# Patient Record
Sex: MALE | Race: WHITE | NOT HISPANIC OR LATINO | Employment: FULL TIME | URBAN - METROPOLITAN AREA
[De-identification: names, ages, dates, MRNs, and addresses within clinical notes are randomized per-mention and may not be internally consistent; named-entity substitution may affect disease eponyms.]

---

## 2017-01-31 ENCOUNTER — OFFICE VISIT (OUTPATIENT)
Dept: INTERNAL MEDICINE | Facility: CLINIC | Age: 57
End: 2017-01-31
Payer: COMMERCIAL

## 2017-01-31 VITALS
TEMPERATURE: 98 F | WEIGHT: 171.5 LBS | OXYGEN SATURATION: 98 % | SYSTOLIC BLOOD PRESSURE: 120 MMHG | HEART RATE: 88 BPM | BODY MASS INDEX: 23.23 KG/M2 | HEIGHT: 72 IN | DIASTOLIC BLOOD PRESSURE: 74 MMHG | RESPIRATION RATE: 18 BRPM

## 2017-01-31 DIAGNOSIS — B20 HIV WASTING SYNDROME: ICD-10-CM

## 2017-01-31 DIAGNOSIS — H46.9 OPTIC NEURITIS, LEFT: ICD-10-CM

## 2017-01-31 DIAGNOSIS — E29.1 HYPOGONADISM IN MALE: Chronic | ICD-10-CM

## 2017-01-31 DIAGNOSIS — B20 HIV DISEASE: Primary | Chronic | ICD-10-CM

## 2017-01-31 DIAGNOSIS — A53.9: ICD-10-CM

## 2017-01-31 DIAGNOSIS — E88.A HIV WASTING SYNDROME: ICD-10-CM

## 2017-01-31 PROCEDURE — 1159F MED LIST DOCD IN RCRD: CPT | Mod: S$GLB,,, | Performed by: INTERNAL MEDICINE

## 2017-01-31 PROCEDURE — 99214 OFFICE O/P EST MOD 30 MIN: CPT | Mod: S$GLB,,, | Performed by: INTERNAL MEDICINE

## 2017-01-31 RX ORDER — ASPIRIN 81 MG/1
81 TABLET ORAL DAILY
COMMUNITY
End: 2017-02-23

## 2017-01-31 RX ORDER — MONTELUKAST SODIUM 10 MG/1
10 TABLET ORAL DAILY
Refills: 11 | COMMUNITY
Start: 2017-01-16 | End: 2017-09-11 | Stop reason: SDUPTHER

## 2017-01-31 RX ORDER — ACYCLOVIR 800 MG/1
800 TABLET ORAL
Qty: 100 TABLET | Refills: 0 | Status: SHIPPED | OUTPATIENT
Start: 2017-01-31 | End: 2017-02-23 | Stop reason: SDUPTHER

## 2017-01-31 RX ORDER — CETIRIZINE HYDROCHLORIDE 10 MG/1
10 TABLET ORAL DAILY
COMMUNITY
End: 2019-11-04

## 2017-01-31 NOTE — PROGRESS NOTES
"Subjective:      Patient ID: Reji Gates is a 56 y.o. male.    Chief Complaint: Follow-up (blurr vision); lesion (anal lesion); CRAMPING (pt c/o cramping in joints); Depression; and Altered Mental Status    HPI: " I've been thru a lot"  - treated for lues x3  - optho: Dr. Tian Argueta, left eye "inflamed optic nerve".  - referral for spinal tap/MRI    Both negative.for lues.  - new lesions around anus, no unprotected sex. Quit taking his acyclovir months ago.  -Dr. Julieth elise neuro-optho arranged for LP done at [facility]    Review of Systems   Constitutional: Positive for fatigue. Negative for activity change, appetite change, chills, diaphoresis and fever.   HENT: Negative.    Eyes: Positive for pain and visual disturbance.   Respiratory: Negative.    Cardiovascular: Negative.    Gastrointestinal: Negative.    Genitourinary: Positive for genital sores.   Musculoskeletal: Negative.         Wasting   Allergic/Immunologic: Negative.    Neurological: Positive for light-headedness.   Hematological: Negative.    Psychiatric/Behavioral: Positive for decreased concentration, dysphoric mood and sleep disturbance.       Objective:     Visit Vitals    /74 (BP Location: Right arm, Patient Position: Sitting, BP Method: Manual)    Pulse 88    Temp 97.9 °F (36.6 °C) (Oral)    Resp 18    Ht 6' (1.829 m)    Wt 77.8 kg (171 lb 8.3 oz)    SpO2 98%    BMI 23.26 kg/m2       Physical Exam   Constitutional: He is oriented to person, place, and time. He appears well-developed and well-nourished.   HENT:   Head: Normocephalic and atraumatic.   Right Ear: External ear normal.   Left Ear: External ear normal.   Nose: Nose normal.   Mouth/Throat: Oropharynx is clear and moist.   Eyes: Conjunctivae and EOM are normal. Pupils are equal, round, and reactive to light.   Neck: Normal range of motion. Neck supple. No JVD present.   Cardiovascular: Normal rate, regular rhythm and normal heart sounds.    Pulmonary/Chest: Effort normal and " breath sounds normal.   Abdominal: Soft. Bowel sounds are normal.   Genitourinary:         Genitourinary Comments: Minimal ulcerations.   Musculoskeletal: Normal range of motion.   Neurological: He is alert and oriented to person, place, and time.   Skin: Skin is warm and dry.   Psychiatric: He has a normal mood and affect. His behavior is normal.   Nursing note and vitals reviewed.      Assessment:     1. HIV disease    2. Hypogonadism in male    3. Lues    4. Optic neuritis, left    5. HIV wasting syndrome      Plan:     HIV disease  -     CBC auto differential; Future; Expected date: 1/31/17  -     Comprehensive metabolic panel; Future; Expected date: 1/31/17  -     CD4 T-[place] Cells; Future; Expected date: 1/31/17  -     Urinalysis; Future; Expected date: 1/31/17  -     HIV RNA, quantitative, PCR; Future; Expected date: 1/31/17    Hypogonadism in male  -     Cortisol; Future; Expected date: 1/31/17  -     Prolactin; Future; Expected date: 1/31/17  -     DHEA-sulfate; Future; Expected date: 1/31/17  -     Testosterone; Future; Expected date: 1/31/17    Lues  -     RPR; Future; Expected date: 1/31/17  -     Urinalysis; Future; Expected date: 1/31/17    Optic neuritis, left  -     RPR; Future; Expected date: 1/31/17  -     Toxoplasma Antibodies (IgG,IgM); Future; Expected date: 1/31/17  -     Cytomegalovirus antibody, IgG; Future; Expected date: 1/31/17  -     Herpes simplex type 1 & 2 IgM,Herpes IgM; Future; Expected date: 1/31/17  -     Herpes simplex type 1&2 IgG,Herpes titer; Future; Expected date: 1/31/17  -     Ct. Ng. Mycoplasmas CLAUDIA, Urine; Future; Expected date: 1/31/17  -     acyclovir (ZOVIRAX) 800 MG Tab; Take 1 tablet (800 mg total) by mouth 5 (five) times daily.  Dispense: 100 tablet; Refill: 0    HIV wasting syndrome  -     Lipid panel; Future; Expected date: 1/31/17  -     Cortisol; Future; Expected date: 1/31/17  -     Prolactin; Future; Expected date: 1/31/17  -     DHEA-sulfate; Future; Expected  date: 1/31/17  -     TSH; Future; Expected date: 1/31/17  -     Testosterone; Future; Expected date: 1/31/17

## 2017-01-31 NOTE — MR AVS SNAPSHOT
[facility] Internal Medicine  [address],  [address]  [place] [address]  Phone: [phone]  Fax: [phone]                  Reji Gates   2017 8:20 AM   Office Visit    Description:  Male : 1960   Provider:  Jamar Mike MD   Department:  [facility] Internal Medicine           Reason for Visit     Follow-up     lesion     CRAMPING     Depression     Altered Mental Status           Diagnoses this Visit        Comments    HIV disease    -  Primary     Hypogonadism in male         Lues         Optic neuritis, left         HIV wasting syndrome                To Do List           Goals (5 Years of Data)     None       These Medications        Disp Refills Start End    acyclovir (ZOVIRAX) 800 MG Tab 100 tablet 0 2017    Take 1 tablet (800 mg total) by mouth 5 (five) times daily. - Oral    [facility] [address] [place] [address] AVE AT [address] [place] [address] Ph #: [phone]         [facility] On Call     [facility] On Call Nurse Care Line -  Assistance  Registered nurses in the [facility] On Call Center provide clinical advisement, health education, appointment booking, and other advisory services.  Call for this free service at [phone].             Medications           Message regarding Medications     Verify the changes and/or additions to your medication regime listed below are the same as discussed with your clinician today.  If any of these changes or additions are incorrect, please notify your healthcare provider.        START taking these NEW medications        Refills    acyclovir (ZOVIRAX) 800 MG Tab 0    Sig: Take 1 tablet (800 mg total) by mouth 5 (five) times daily.    Class: Normal    Route: Oral      STOP taking these medications     sulfamethoxazole-trimethoprim 800-160mg (BACTRIM DS) 800-160 mg Tab Take 1 tablet by mouth 2 (two) times daily.           Verify that the below list of  medications is an accurate representation of the medications you are currently taking.  If none reported, the list may be blank. If incorrect, please contact your healthcare provider. Carry this list with you in case of emergency.           Current Medications     alprazolam (XANAX) 0.5 MG tablet Take 1 tablet (0.5 mg total) by mouth once daily.    ascorbic acid (VITAMIN C) 1000 MG tablet Take 1,000 mg by mouth 2 (two) times daily.    aspirin (ECOTRIN) 81 MG EC tablet Take 81 mg by mouth once daily.    azelastine (OPTIVAR) 0.05 % ophthalmic solution Place 1 drop into both eyes once daily.    cetirizine (ZYRTEC) 10 MG tablet Take 10 mg by mouth once daily.    clindamycin phosphate 1% (CLINDAGEL) 1 % gel APPLY BID    vmltrkjp-tqcwmeep-gtbqmz-tenof, STRIBILD, 169-277-074-300 mg (STRIBILD) [phone] mg per tablet Take 1 tablet by mouth once daily.    FISH,SAF,FLX,BRG OILS/O3,6,9#2 (FISH-FLAX-BORAGE OIL ORAL) Take 1 capsule by mouth 2 (two) times daily.    hydrocodone-ibuprofen 7.5-200 mg (VICOPROFEN) 7.5-200 mg per tablet Take 1 tablet by mouth every 12 (twelve) hours.    mometasone (NASONEX) 50 mcg/actuation nasal spray 2 sprays by Nasal route once daily.    montelukast (SINGULAIR) 10 mg tablet Take 10 mg by mouth once daily.    MULTIVIT-MINERALS/FERROUS FUM (MULTI VITAMIN ORAL) Take by mouth.    ranitidine (ZANTAC) 150 MG tablet Take 150 mg by mouth 2 (two) times daily.    acyclovir (ZOVIRAX) 800 MG Tab Take 1 tablet (800 mg total) by mouth 5 (five) times daily.           Clinical Reference Information           Vital Signs - Last Recorded  Most recent update: 1/31/2017  8:36 AM by Thelma Cruz MA    BP Pulse Temp Resp Ht Wt    120/74 (BP Location: Right arm, Patient Position: Sitting, BP Method: Manual) 88 97.9 °F (36.6 °C) (Oral) 18 6' (1.829 m) 77.8 kg (171 lb 8.3 oz)    SpO2 BMI             98% 23.26 kg/m2         Blood Pressure          Most Recent Value    BP  120/74      Allergies as of 1/31/2017     No  Known Allergies      Immunizations Administered on Date of Encounter - 1/31/2017     None      Orders Placed During Today's Visit      Normal Orders This Visit    CBC auto differential     CD4 T-[place] Cells     Comprehensive metabolic panel     Ct. Ng. Mycoplasmas CLAUDIA, Urine     Cytomegalovirus antibody, IgG     HIV RNA, quantitative, PCR     Lipid panel     RPR     Urinalysis     Future Labs/Procedures Expected by Expires    CBC auto differential  1/31/2017 1/31/2018    CD4 T-[place] Cells  1/31/2017 4/1/2018    Comprehensive metabolic panel  1/31/2017 1/31/2018    Cortisol  1/31/2017 4/1/2018    Ct. Ng. Mycoplasmas CLAUDIA, Urine  1/31/2017 4/1/2018    Cytomegalovirus antibody, IgG  1/31/2017 4/1/2018    DHEA-sulfate  1/31/2017 4/1/2018    Herpes simplex type 1 & 2 IgM,Herpes IgM  1/31/2017 4/1/2018    Herpes simplex type 1&2 IgG,Herpes titer  1/31/2017 4/1/2018    HIV RNA, quantitative, PCR  1/31/2017 4/1/2018    Lipid panel  1/31/2017 1/31/2018    Prolactin  1/31/2017 4/1/2018    RPR  1/31/2017 4/1/2018    Testosterone  1/31/2017 1/31/2018    Toxoplasma Antibodies (IgG,IgM)  1/31/2017 4/1/2018    TSH  1/31/2017 1/31/2018    Urinalysis  1/31/2017 1/31/2018

## 2017-02-02 LAB
ALBUMIN SERPL-MCNC: 4.4 G/DL (ref 3.5–5.5)
ALBUMIN/GLOB SERPL: 2.1 {RATIO} (ref 1.1–2.5)
ALP SERPL-CCNC: 98 IU/L (ref 39–117)
ALT SERPL-CCNC: 67 IU/L (ref 0–44)
APPEARANCE UR: CLEAR
AST SERPL-CCNC: 47 IU/L (ref 0–40)
BASOPHILS # BLD AUTO: 0 X10E3/UL (ref 0–0.2)
BASOPHILS NFR BLD AUTO: 1 %
BILIRUB SERPL-MCNC: 0.4 MG/DL (ref 0–1.2)
BILIRUB UR QL STRIP: NEGATIVE
BUN SERPL-MCNC: 20 MG/DL (ref 6–24)
BUN/CREAT SERPL: 19 (ref 9–20)
CALCIUM SERPL-MCNC: 9.4 MG/DL (ref 8.7–10.2)
CD3+CD4+ CELLS # BLD: 944 /UL (ref 359–1519)
CD3+CD4+ CELLS NFR BLD: 42.9 % (ref 30.8–58.5)
CHLORIDE SERPL-SCNC: 103 MMOL/L (ref 96–106)
CHOLEST SERPL-MCNC: 164 MG/DL (ref 100–199)
CMV IGG SERPL IA-ACNC: >10 U/ML (ref 0–0.59)
CO2 SERPL-SCNC: 24 MMOL/L (ref 18–29)
COLOR UR: YELLOW
CREAT SERPL-MCNC: 1.07 MG/DL (ref 0.76–1.27)
EOSINOPHIL # BLD AUTO: 0.1 X10E3/UL (ref 0–0.4)
EOSINOPHIL NFR BLD AUTO: 1 %
ERYTHROCYTE [DISTWIDTH] IN BLOOD BY AUTOMATED COUNT: 14.8 % (ref 12.3–15.4)
GLOBULIN SER CALC-MCNC: 2.1 G/DL (ref 1.5–4.5)
GLUCOSE SERPL-MCNC: 95 MG/DL (ref 65–99)
GLUCOSE UR QL: NEGATIVE
HCT VFR BLD AUTO: 47.1 % (ref 37.5–51)
HDLC SERPL-MCNC: 43 MG/DL
HGB BLD-MCNC: 15.4 G/DL (ref 12.6–17.7)
HGB UR QL STRIP: NEGATIVE
HIV1 RNA # SERPL NAA+PROBE: <20 COPIES/ML
HIV1 RNA SERPL NAA+PROBE-LOG#: NORMAL LOG10COPY/ML
IMM GRANULOCYTES # BLD: 0 X10E3/UL (ref 0–0.1)
IMM GRANULOCYTES NFR BLD: 0 %
KETONES UR QL STRIP: NEGATIVE
LDLC SERPL CALC-MCNC: 92 MG/DL (ref 0–99)
LEUKOCYTE ESTERASE UR QL STRIP: NEGATIVE
LYMPHOCYTES # BLD AUTO: 2.2 X10E3/UL (ref 0.7–3.1)
LYMPHOCYTES NFR BLD AUTO: 35 %
MCH RBC QN AUTO: 30.9 PG (ref 26.6–33)
MCHC RBC AUTO-ENTMCNC: 32.7 G/DL (ref 31.5–35.7)
MCV RBC AUTO: 95 FL (ref 79–97)
MICRO URNS: NORMAL
MONOCYTES # BLD AUTO: 0.5 X10E3/UL (ref 0.1–0.9)
MONOCYTES NFR BLD AUTO: 9 %
NEUTROPHILS # BLD AUTO: 3.5 X10E3/UL (ref 1.4–7)
NEUTROPHILS NFR BLD AUTO: 54 %
NITRITE UR QL STRIP: NEGATIVE
PH UR STRIP: 5 [PH] (ref 5–7.5)
PLATELET # BLD AUTO: 253 X10E3/UL (ref 150–379)
POTASSIUM SERPL-SCNC: 4.6 MMOL/L (ref 3.5–5.2)
PROT SERPL-MCNC: 6.5 G/DL (ref 6–8.5)
PROT UR QL STRIP: NORMAL
RBC # BLD AUTO: 4.98 X10E6/UL (ref 4.14–5.8)
RPR SER QL: REACTIVE
RPR SER-TITR: ABNORMAL {TITER}
SODIUM SERPL-SCNC: 143 MMOL/L (ref 134–144)
SP GR UR: 1.03 (ref 1–1.03)
TRIGL SERPL-MCNC: 145 MG/DL (ref 0–149)
UROBILINOGEN UR STRIP-MCNC: 0.2 MG/DL (ref 0.2–1)
VLDLC SERPL CALC-MCNC: 29 MG/DL (ref 5–40)
WBC # BLD AUTO: 6.3 X10E3/UL (ref 3.4–10.8)

## 2017-02-03 LAB
C TRACH RRNA UR QL NAA+PROBE: NEGATIVE
COMMENT: NORMAL
M GENITALIUM DNA SPEC QL NAA+PROBE: NEGATIVE
M HOMINIS DNA SPEC QL NAA+PROBE: NEGATIVE
N GONORRHOEA RRNA UR QL NAA+PROBE: NEGATIVE
UREAPLASMA DNA SPEC QL NAA+PROBE: NEGATIVE

## 2017-02-10 ENCOUNTER — OFFICE VISIT (OUTPATIENT)
Dept: INTERNAL MEDICINE | Facility: CLINIC | Age: 57
End: 2017-02-10
Payer: COMMERCIAL

## 2017-02-10 VITALS
HEART RATE: 90 BPM | HEIGHT: 72 IN | SYSTOLIC BLOOD PRESSURE: 121 MMHG | RESPIRATION RATE: 18 BRPM | OXYGEN SATURATION: 98 % | BODY MASS INDEX: 23.2 KG/M2 | DIASTOLIC BLOOD PRESSURE: 78 MMHG | TEMPERATURE: 99 F | WEIGHT: 171.31 LBS

## 2017-02-10 DIAGNOSIS — H46.9 LEFT OPTIC NEURITIS: ICD-10-CM

## 2017-02-10 DIAGNOSIS — B20 HIV DISEASE: Primary | Chronic | ICD-10-CM

## 2017-02-10 DIAGNOSIS — E29.1 HYPOGONADISM IN MALE: Chronic | ICD-10-CM

## 2017-02-10 PROCEDURE — 99214 OFFICE O/P EST MOD 30 MIN: CPT | Mod: S$GLB,,, | Performed by: INTERNAL MEDICINE

## 2017-02-10 NOTE — MR AVS SNAPSHOT
[facility] Internal Medicine  [address],  [address]  [place] [address]  Phone: [phone]  Fax: [phone]                  Reji Gates   2/10/2017 8:40 AM   Office Visit    Description:  Male : 1960   Provider:  Jamar Mike MD   Department:  [facility]           Reason for Visit     Results     Cough     Medication Refill           Diagnoses this Visit        Comments    HIV disease    -  Primary     Left optic neuritis         Hypogonadism in male                To Do List           Future Appointments        Provider Department Dept Phone    2017 10:20 AM Jamar Mike MD [facility] [phone]      Goals (5 Years of Data)     None      [facility]     [facility] Care [facility] -  Assistance  Registered nurses in the [facility] Center provide clinical advisement, health education, appointment booking, and other advisory services.  Call for this free service at [phone].             Medications           Message regarding Medications     Verify the changes and/or additions to your medication regime listed below are the same as discussed with your clinician today.  If any of these changes or additions are incorrect, please notify your healthcare provider.             Verify that the below list of medications is an accurate representation of the medications you are currently taking.  If none reported, the list may be blank. If incorrect, please contact your healthcare provider. Carry this list with you in case of emergency.           Current Medications     acyclovir (ZOVIRAX) 800 MG Tab Take 1 tablet (800 mg total) by mouth 5 (five) times daily.    alprazolam (XANAX) 0.5 MG tablet Take 1 tablet (0.5 mg total) by mouth once daily.    ascorbic acid (VITAMIN C) 1000 MG tablet Take 1,000 mg by mouth 2 (two) times daily.    aspirin (ECOTRIN) 81 MG EC tablet Take 81 mg by mouth once daily.     azelastine (OPTIVAR) 0.05 % ophthalmic solution Place 1 drop into both eyes once daily.    cetirizine (ZYRTEC) 10 MG tablet Take 10 mg by mouth once daily.    clindamycin phosphate 1% (CLINDAGEL) 1 % gel APPLY BID    rkvoghkp-acveoqes-gbskit-tenof, STRIBILD, [phone] mg (STRIBILD) [phone] mg per tablet Take 1 tablet by mouth once daily.    FISH,SAF,FLX,BRG OILS/O3,6,9#2 (FISH-FLAX-BORAGE OIL ORAL) Take 1 capsule by mouth 2 (two) times daily.    hydrocodone-ibuprofen 7.5-200 mg (VICOPROFEN) 7.5-200 mg per tablet Take 1 tablet by mouth every 12 (twelve) hours.    mometasone (NASONEX) 50 mcg/actuation nasal spray 2 sprays by Nasal route once daily.    montelukast (SINGULAIR) 10 mg tablet Take 10 mg by mouth once daily.    MULTIVIT-MINERALS/FERROUS FUM (MULTI VITAMIN ORAL) Take by mouth.    ranitidine (ZANTAC) 150 MG tablet Take 150 mg by mouth 2 (two) times daily.           Clinical Reference Information           Your Vitals Were     BP Pulse Temp Resp Height Weight    121/78 (BP Location: Left arm, Patient Position: Sitting, BP Method: Manual) 90 98.6 °F (37 °C) (Oral) 18 6' (1.829 m) 77.7 kg (171 lb 4.8 oz)    SpO2 BMI             98% 23.23 kg/m2         Blood Pressure          Most Recent Value    BP  121/78      Allergies as of 2/10/2017     No Known Allergies      Immunizations Administered on Date of Encounter - 2/10/2017     None      Orders Placed During Today's Visit      Normal Orders This Visit    Cortisol     DHEA-sulfate     Herpes simplex type 1 & 2 IgM,Herpes IgM     Herpes simplex type 1&2 IgG,Herpes titer     Testosterone     Toxoplasma gondii antibody, IgM     Future Labs/Procedures Expected by Expires    Cortisol  2/10/2017 4/11/2018    DHEA-sulfate  2/10/2017 4/11/2018    Herpes simplex type 1 & 2 IgM,Herpes IgM  2/10/2017 4/11/2018    Herpes simplex type 1&2 IgG,Herpes titer  2/10/2017 4/11/2018    Testosterone  2/10/2017 2/10/2018    Toxoplasma gondii antibody, IgM  2/10/2017  4/11/2018      Language Assistance Services     ATTENTION: Language assistance services are available, free of charge. Please call [phone].      ATENCIÓN: Si habla romel, tiene a bailey disposición servicios gratuitos de asistencia lingüística. Llame al [phone].     CHÚ Ý: N?u b?n nói Ti?ng Vi?t, có các d?ch v? h? tr? ngôn ng? mi?n phí dành cho b?n. G?i s? [phone].         [facility] complies with applicable Federal civil rights laws and does not discriminate on the basis of race, color, national origin, age, disability, or sex.

## 2017-02-10 NOTE — PROGRESS NOTES
Subjective:      Patient ID: Reji Gates is a 56 y.o. male.    Chief Complaint: Results (HIV Positive/AID Lab results); Cough (pt c/o coughting for three days); and Medication Refill    HPI: Not all labs were drawn.  RPR + 1:256, this must be followed carefully.  His vision, seems to be better.  Still bruising easily.  Thinks lesions may be better.  Wet cough, but no fever,chills.   01/31/17 1102 HDL 43 - Final result     01/31/17 1102 CHOL 164 - Final result     01/31/17 1102 TRIG 145 - Final result     01/31/17 1102 LDLCALC 92 - Final result     01/31/17 1102 COLORU Yellow - Final result     01/31/17 1102 SPECGRAV 1.026 - Final result     01/31/17 1102 PHUR 5.0 - Final result     01/31/17 1102 GLUCOSEU Negative - Final result     01/31/17 1102 BILIRUBINUR Negative - Final result     01/31/17 1102 KETONESU Negative - Final result     01/31/17 1102 OCCULTUA Negative - Final result     01/31/17 1102 PROTEINUA Trace - Final result     01/31/17 1102 NITRITE Negative - Final result     01/31/17 1102 LEUKOCYTESUR Negative - Final result     01/31/17 1102 WBC 6.3 - Final result     01/31/17 1102 RBC 4.98 - Final result     01/31/17 1102 HGB 15.4 - Final result     01/31/17 1102 HCT 47.1 - Final result     01/31/17 1102 MCH 30.9 - Final result     01/31/17 1102 RDW 14.8 - Final result     01/31/17 1102  - Final result     01/31/17 1102 GLU 95 - Final result     01/31/17 1102 BUN 20 - Final result     01/31/17 1102 CREATININE 1.07 - Final result     01/31/17 1102 CALCIUM 9.4 - Final result     01/31/17 1102  - Final result     01/31/17 1102 K 4.6 - Final result     01/31/17 1102  - Final result     01/31/17 1102 PROT 6.5 - Final result     01/31/17 1102 ALBUMIN 4.4 - Final result     01/31/17 1102 BILITOT 0.4 - Final result     01/31/17 1102 AST 47 High Final result     01/31/17 1102 ALKPHOS 98 - Final result     01/31/17 1102 CO2 24 - Final result     01/31/17 1102 ALT 67 High Final result      01/31/17 1102 EGFRNONAA 76 - Final result     01/31/17 1102 MCV 95 - Final result           Review of Systems    Objective:     Visit Vitals    /78 (BP Location: Left arm, Patient Position: Sitting, BP Method: Manual)    Pulse 90    Temp 98.6 °F (37 °C) (Oral)    Resp 18    Ht 6' (1.829 m)    Wt 77.7 kg (171 lb 4.8 oz)    SpO2 98%    BMI 23.23 kg/m2       Physical Exam   Constitutional: He is oriented to person, place, and time. He appears well-developed and well-nourished.   HENT:   Head: Normocephalic and atraumatic.   Right Ear: External ear normal.   Left Ear: External ear normal.   Nose: Nose normal.   Mouth/Throat: Oropharynx is clear and moist.   Eyes: Conjunctivae are normal. Pupils are equal, round, and reactive to light.   Neck: Normal range of motion.   Cardiovascular: Normal rate, regular rhythm and normal heart sounds.    Pulmonary/Chest: Effort normal and breath sounds normal. He has no wheezes. He exhibits no tenderness.   Abdominal: Soft. Bowel sounds are normal.   Musculoskeletal: Normal range of motion.   Neurological: He is alert and oriented to person, place, and time.   Skin: Skin is warm and dry.   Psychiatric: He has a normal mood and affect. His behavior is normal.   Nursing note and vitals reviewed.      Assessment:     1. HIV disease    2. Left optic neuritis    3. Hypogonadism in male      Plan:     HIV disease  -     Toxoplasma gondii antibody, IgM; Future; Expected date: 2/10/17  -     Herpes simplex type 1 & 2 IgM,Herpes IgM; Future; Expected date: 2/10/17  -     Herpes simplex type 1&2 IgG,Herpes titer; Future; Expected date: 2/10/17    Left optic neuritis  -     Toxoplasma gondii antibody, IgM; Future; Expected date: 2/10/17  -     Herpes simplex type 1 & 2 IgM,Herpes IgM; Future; Expected date: 2/10/17  -     Herpes simplex type 1&2 IgG,Herpes titer; Future; Expected date: 2/10/17    Hypogonadism in male  -     Cortisol; Future; Expected date: 2/10/17  -      DHEA-sulfate; Future; Expected date: 2/10/17  -     Testosterone; Future; Expected date: 2/10/17

## 2017-02-11 LAB
BASOPHILS # BLD AUTO: 0 X10E3/UL (ref 0–0.2)
BASOPHILS NFR BLD AUTO: 0 %
EOSINOPHIL # BLD AUTO: 0.1 X10E3/UL (ref 0–0.4)
EOSINOPHIL NFR BLD AUTO: 1 %
ERYTHROCYTE [DISTWIDTH] IN BLOOD BY AUTOMATED COUNT: 14.1 % (ref 12.3–15.4)
HCT VFR BLD AUTO: 44.7 % (ref 37.5–51)
HGB BLD-MCNC: 14.8 G/DL (ref 12.6–17.7)
IMM GRANULOCYTES # BLD: 0 X10E3/UL (ref 0–0.1)
IMM GRANULOCYTES NFR BLD: 0 %
LYMPHOCYTES # BLD AUTO: 2 X10E3/UL (ref 0.7–3.1)
LYMPHOCYTES NFR BLD AUTO: 20 %
MCH RBC QN AUTO: 31.2 PG (ref 26.6–33)
MCHC RBC AUTO-ENTMCNC: 33.1 G/DL (ref 31.5–35.7)
MCV RBC AUTO: 94 FL (ref 79–97)
MONOCYTES # BLD AUTO: 0.8 X10E3/UL (ref 0.1–0.9)
MONOCYTES NFR BLD AUTO: 8 %
NEUTROPHILS # BLD AUTO: 7.2 X10E3/UL (ref 1.4–7)
NEUTROPHILS NFR BLD AUTO: 71 %
PLATELET # BLD AUTO: 284 X10E3/UL (ref 150–379)
RBC # BLD AUTO: 4.75 X10E6/UL (ref 4.14–5.8)
WBC # BLD AUTO: 10.1 X10E3/UL (ref 3.4–10.8)

## 2017-02-13 LAB
COMMENT: ABNORMAL
COMMENT:: NORMAL
CORTIS SERPL-MCNC: 2.6 UG/DL
DHEA-S SERPL-MCNC: 68.2 UG/DL (ref 48.9–344.2)
HSV1 IGG SER IA-ACNC: 42.6 INDEX (ref 0–0.9)
HSV1+2 IGM SER IA-ACNC: <0.91 RATIO (ref 0–0.9)
HSV2 IGG SER IA-ACNC: 1.4 INDEX (ref 0–0.9)
T GONDII IGM SER IA-ACNC: <3 AU/ML (ref 0–7.9)
TESTOST SERPL-MCNC: >1500 NG/DL (ref 348–1197)

## 2017-02-23 ENCOUNTER — OFFICE VISIT (OUTPATIENT)
Dept: INTERNAL MEDICINE | Facility: CLINIC | Age: 57
End: 2017-02-23
Payer: COMMERCIAL

## 2017-02-23 VITALS
HEIGHT: 72 IN | RESPIRATION RATE: 18 BRPM | WEIGHT: 171.31 LBS | BODY MASS INDEX: 23.2 KG/M2 | TEMPERATURE: 99 F | OXYGEN SATURATION: 97 % | HEART RATE: 74 BPM

## 2017-02-23 DIAGNOSIS — H46.9 OPTIC NEURITIS, LEFT: ICD-10-CM

## 2017-02-23 DIAGNOSIS — J30.2 SEASONAL ALLERGIC RHINITIS, UNSPECIFIED ALLERGIC RHINITIS TRIGGER: ICD-10-CM

## 2017-02-23 DIAGNOSIS — F51.02 ADJUSTMENT INSOMNIA: Chronic | ICD-10-CM

## 2017-02-23 DIAGNOSIS — B20 HIV DISEASE: Chronic | ICD-10-CM

## 2017-02-23 PROCEDURE — 99214 OFFICE O/P EST MOD 30 MIN: CPT | Mod: S$GLB,,, | Performed by: INTERNAL MEDICINE

## 2017-02-23 PROCEDURE — 1160F RVW MEDS BY RX/DR IN RCRD: CPT | Mod: S$GLB,,, | Performed by: INTERNAL MEDICINE

## 2017-02-23 RX ORDER — ACYCLOVIR 800 MG/1
800 TABLET ORAL DAILY
Qty: 90 TABLET | Refills: 5 | Status: SHIPPED | OUTPATIENT
Start: 2017-02-23 | End: 2018-05-11 | Stop reason: SDUPTHER

## 2017-02-23 RX ORDER — ALPRAZOLAM 0.5 MG/1
0.5 TABLET ORAL DAILY
Qty: 30 TABLET | Refills: 2 | Status: SHIPPED | OUTPATIENT
Start: 2017-02-23 | End: 2018-05-11 | Stop reason: SDUPTHER

## 2017-02-23 RX ORDER — MOMETASONE FUROATE 50 UG/1
2 SPRAY, METERED NASAL DAILY
Qty: 17 G | Refills: 5 | Status: SHIPPED | OUTPATIENT
Start: 2017-02-23 | End: 2017-07-13 | Stop reason: SDUPTHER

## 2017-02-23 NOTE — MR AVS SNAPSHOT
[facility] Internal Medicine  [address],  [address]  [place] [address]  Phone: [phone]  Fax: [phone]                  Reji Gates   2017 10:20 AM   Office Visit    Description:  Male : 1960   Provider:  Jamar Mike MD   Department:  [facility]           Reason for Visit     Results     HIV Positive/AIDS     Medication Refill           Diagnoses this Visit        Comments    HIV disease         Optic neuritis, left         Adjustment insomnia         Seasonal allergic rhinitis, unspecified allergic rhinitis trigger                To Do List           Goals (5 Years of Data)     None       These Medications        Disp Refills Start End    bnnulcgw-ebcuypqp-ahdqyf-tenof, STRIBILD, [phone] mg (STRIBILD) [phone] mg per tablet 90 tablet 3 2017    Take 1 tablet by mouth once daily. - Oral    Pharmacy: [facility] - [facility] Ph #: [phone]       acyclovir (ZOVIRAX) 800 MG Tab 90 tablet 5 2017    Take 1 tablet (800 mg total) by mouth once daily. - Oral    Pharmacy: [facility] - [facility] Ph #: [phone]       alprazolam (XANAX) 0.5 MG tablet 30 tablet 2 2017     Take 1 tablet (0.5 mg total) by mouth once daily. - Oral    Pharmacy: [facility] - [facility] Ph #: [phone]       mometasone (NASONEX) 50 mcg/actuation nasal spray 17 g 5 2017     2 sprays by Nasal route once daily. - Nasal    Pharmacy: [facility] - [facility] Ph #: [phone]         [facility]     [facility] Care Line -  Assistance  Registered nurses in the [facility] On Call Center provide clinical advisement, health education, appointment booking, and other advisory services.  Call for this free service at [phone].              Medications           Message regarding Medications     Verify the changes and/or additions to your medication regime listed below are the same as discussed with your clinician today.  If any of these changes or additions are incorrect, please notify your healthcare provider.        CHANGE how you are taking these medications     Start Taking Instead of    acyclovir (ZOVIRAX) 800 MG Tab acyclovir (ZOVIRAX) 800 MG Tab    Dosage:  Take 1 tablet (800 mg total) by mouth once daily. Dosage:  Take 1 tablet (800 mg total) by mouth 5 (five) times daily.    Reason for Change:  Reorder       STOP taking these medications     aspirin (ECOTRIN) 81 MG EC tablet Take 81 mg by mouth once daily.           Verify that the below list of medications is an accurate representation of the medications you are currently taking.  If none reported, the list may be blank. If incorrect, please contact your healthcare provider. Carry this list with you in case of emergency.           Current Medications     acyclovir (ZOVIRAX) 800 MG Tab Take 1 tablet (800 mg total) by mouth once daily.    alprazolam (XANAX) 0.5 MG tablet Take 1 tablet (0.5 mg total) by mouth once daily.    ascorbic acid (VITAMIN C) 1000 MG tablet Take 1,000 mg by mouth 2 (two) times daily.    azelastine (OPTIVAR) 0.05 % ophthalmic solution Place 1 drop into both eyes once daily.    cetirizine (ZYRTEC) 10 MG tablet Take 10 mg by mouth once daily.    clindamycin phosphate 1% (CLINDAGEL) 1 % gel APPLY BID    tkgrsyrh-lzwchpen-nexlag-tenof, STRIBILD, [phone] mg (STRIBILD) [phone] mg per tablet Take 1 tablet by mouth once daily.    FISH,SAF,FLX,BRG OILS/O3,6,9#2 (FISH-FLAX-BORAGE OIL ORAL) Take 1 capsule by mouth 2 (two) times daily.    hydrocodone-ibuprofen 7.5-200 mg (VICOPROFEN) 7.5-200 mg per tablet Take 1 tablet by mouth every 12 (twelve) hours.    mometasone (NASONEX) 50 mcg/actuation nasal spray 2 sprays by Nasal route once daily.    montelukast (SINGULAIR)  10 mg tablet Take 10 mg by mouth once daily.    MULTIVIT-MINERALS/FERROUS FUM (MULTI VITAMIN ORAL) Take by mouth.    ranitidine (ZANTAC) 150 MG tablet Take 150 mg by mouth 2 (two) times daily.           Clinical Reference Information           Your Vitals Were     Pulse Temp Resp Height Weight SpO2    74 99 °F (37.2 °C) (Oral) 18 6' (1.829 m) 77.7 kg (171 lb 4.8 oz) 97%    BMI                23.23 kg/m2          Allergies as of 2/23/2017     No Known Allergies      Immunizations Administered on Date of Encounter - 2/23/2017     None      Language Assistance Services     ATTENTION: Language assistance services are available, free of charge. Please call [phone].      ATENCIÓN: Si quinn urena, tiene a bailey disposición servicios gratuitos de asistencia lingüística. Llame al [phone].     CHÚ Ý: N?u b?n nói Ti?ng Vi?t, có các d?ch v? h? tr? ngôn ng? mi?n phí dành cho b?n. G?i s? [phone].         [facility] complies with applicable Federal civil rights laws and does not discriminate on the basis of race, color, national origin, age, disability, or sex.

## 2017-02-23 NOTE — PROGRESS NOTES
Subjective:      Patient ID: Reji Gates is a 56 y.o. male.    Chief Complaint: Results; HIV Positive/AIDS; and Medication Refill    HPI: 56y/oWM, being followed by Retina specialist for a left optic neuritis.  Work up only + for IgG  HSV I & II .  IgM is -.  Rest of work up negative.  Testosterone level >1500, nl cortison and DHEA  Toxo negative.  CSF syphilis apparently neg, but result no available for me to see.    Review of Systems   HENT:        Allergic rhinitis   Eyes: Positive for visual disturbance.   Allergic/Immunologic: Positive for environmental allergies.   All other systems reviewed and are negative.      Objective:     Visit Vitals    Pulse 74    Temp 99 °F (37.2 °C) (Oral)    Resp 18    Ht 6' (1.829 m)    Wt 77.7 kg (171 lb 4.8 oz)    SpO2 97%    BMI 23.23 kg/m2       Physical Exam   Constitutional: He is oriented to person, place, and time. He appears well-developed and well-nourished.   HENT:   Head: Normocephalic and atraumatic.   Neck: Normal range of motion. Neck supple.   Cardiovascular: Normal rate, regular rhythm and normal heart sounds.    Pulmonary/Chest: Effort normal and breath sounds normal.   Musculoskeletal: Normal range of motion.   Neurological: He is alert and oriented to person, place, and time.   Skin: Skin is warm and dry.   Psychiatric: He has a normal mood and affect. His behavior is normal.   Nursing note and vitals reviewed.      Assessment:     1. HIV disease    2. Optic neuritis, left    3. Adjustment insomnia    4. Seasonal allergic rhinitis, unspecified allergic rhinitis trigger      Plan:     HIV disease  -     tztjkacp-treaoffg-qkvuiw-tenof, STRIBILD, [id number] mg (STRIBILD) [phone] mg per tablet; Take 1 tablet by mouth once daily.  Dispense: 90 tablet; Refill: 3    Optic neuritis, left  -     acyclovir (ZOVIRAX) 800 MG Tab; Take 1 tablet (800 mg total) by mouth once daily.  Dispense: 90 tablet; Refill: 5    Adjustment insomnia  -     alprazolam  (XANAX) 0.5 MG tablet; Take 1 tablet (0.5 mg total) by mouth once daily.  Dispense: 30 tablet; Refill: 2    Seasonal allergic rhinitis, unspecified allergic rhinitis trigger  -     mometasone (NASONEX) 50 mcg/actuation nasal spray; 2 sprays by Nasal route once daily.  Dispense: 17 g; Refill: 5

## 2017-03-13 ENCOUNTER — OFFICE VISIT (OUTPATIENT)
Dept: INTERNAL MEDICINE | Facility: CLINIC | Age: 57
End: 2017-03-13
Payer: COMMERCIAL

## 2017-03-13 VITALS
OXYGEN SATURATION: 99 % | SYSTOLIC BLOOD PRESSURE: 138 MMHG | TEMPERATURE: 100 F | DIASTOLIC BLOOD PRESSURE: 84 MMHG | RESPIRATION RATE: 20 BRPM | BODY MASS INDEX: 22.99 KG/M2 | HEIGHT: 72 IN | HEART RATE: 86 BPM | WEIGHT: 169.75 LBS

## 2017-03-13 DIAGNOSIS — J11.1 FLU SYNDROME: Primary | ICD-10-CM

## 2017-03-13 PROCEDURE — 99213 OFFICE O/P EST LOW 20 MIN: CPT | Mod: S$GLB,,, | Performed by: INTERNAL MEDICINE

## 2017-03-13 PROCEDURE — 1160F RVW MEDS BY RX/DR IN RCRD: CPT | Mod: S$GLB,,, | Performed by: INTERNAL MEDICINE

## 2017-03-13 RX ORDER — OSELTAMIVIR PHOSPHATE 75 MG/1
75 CAPSULE ORAL 2 TIMES DAILY
Qty: 10 CAPSULE | Refills: 0 | Status: SHIPPED | OUTPATIENT
Start: 2017-03-13 | End: 2017-03-18

## 2017-03-13 RX ORDER — OSELTAMIVIR PHOSPHATE 75 MG/1
75 CAPSULE ORAL 2 TIMES DAILY
Qty: 10 CAPSULE | Refills: 0 | Status: SHIPPED | OUTPATIENT
Start: 2017-03-13 | End: 2017-03-13 | Stop reason: SDUPTHER

## 2017-03-13 RX ORDER — CODEINE PHOSPHATE AND GUAIFENESIN 10; 100 MG/5ML; MG/5ML
5 SOLUTION ORAL 3 TIMES DAILY PRN
Qty: 180 ML | Refills: 0 | Status: SHIPPED | OUTPATIENT
Start: 2017-03-13 | End: 2017-03-23

## 2017-03-13 NOTE — LETTER
March 13, 2017      [facility]  [address],  [address]  [place] [address]  Phone: [phone]  Fax: [phone]       Patient: Reji Gates   YOB: 1960  Date of Visit: 03/13/2017    To Whom It May Concern:    Reji was at [facility] on 03/13/2017. He may return to work/school on 3/14/2017 with no restrictions. If you have any questions or concerns, or if I can be of further assistance, please do not hesitate to contact me.    Sincerely,          Jamar Mike. MD/Linda Antonio LPN

## 2017-03-13 NOTE — PROGRESS NOTES
"Subjective:      Patient ID: Reji Gates is a 56 y.o. male.    Chief Complaint: Generalized Body Aches; Fever; Cough; and Chest Congestion    HPI: 56y/oWM, has had one day of generalized myalgias, nasal and chest congestion, cough  Non productive, fever > 100.5, and just " not feeling well".  Worked over the days coming up to [employer].  Did get the Flu vaccine.    Review of Systems   Constitutional: Positive for chills, fatigue and fever.   HENT: Positive for congestion, postnasal drip, rhinorrhea and sinus pressure. Negative for sneezing and sore throat.    Eyes: Negative.    Respiratory: Positive for cough and shortness of breath.    Cardiovascular: Negative for chest pain and palpitations.   Gastrointestinal: Negative.    Endocrine: Negative.    Genitourinary: Negative.    Musculoskeletal: Positive for myalgias.   Skin: Negative.    Allergic/Immunologic: Negative.    Neurological: Positive for weakness. Negative for light-headedness and headaches.   Hematological: Negative.    Psychiatric/Behavioral: Negative.        Objective:   /84 (BP Location: Left arm, Patient Position: Sitting, BP Method: Manual)  Pulse 86  Temp 99.5 °F (37.5 °C) (Oral)   Resp 20  Ht 6' (1.829 m)  Wt 77 kg (169 lb 12.1 oz)  SpO2 99%  BMI 23.02 kg/m2    Physical Exam   Constitutional: He appears well-developed and well-nourished. He appears listless. He appears ill.   HENT:   Head: Normocephalic and atraumatic.   Right Ear: External ear normal.   Left Ear: External ear normal.   Nose: Nose normal.   Mouth/Throat: Oropharynx is clear and moist.   Eyes: Conjunctivae and EOM are normal. Pupils are equal, round, and reactive to light.   Neck: Normal range of motion. Neck supple.   Cardiovascular: Normal rate, regular rhythm and normal heart sounds.    Pulmonary/Chest: Effort normal. He has rales.   Abdominal: Soft. Bowel sounds are normal.   Musculoskeletal: Normal range of motion.   Neurological: He appears listless. No " cranial nerve deficit.   Skin: Skin is warm and dry. There is pallor.   Psychiatric: He has a normal mood and affect. His behavior is normal.   Nursing note and vitals reviewed.      Assessment:     1. Flu syndrome      Plan:     Flu syndrome  -     Discontinue: oseltamivir (TAMIFLU) 75 MG capsule; Take 1 capsule (75 mg total) by mouth 2 (two) times daily.  Dispense: 10 capsule; Refill: 0  -     guaifenesin-codeine 100-10 mg/5 ml (TUSSI-ORGANIDIN NR)  mg/5 mL syrup; Take 5 mLs by mouth 3 (three) times daily as needed for Cough or Congestion.  Dispense: 180 mL; Refill: 0  -     oseltamivir (TAMIFLU) 75 MG capsule; Take 1 capsule (75 mg total) by mouth 2 (two) times daily.  Dispense: 10 capsule; Refill: 0  Rest, fluids, tylenol, may go to work tomorrow if he feels like he can.

## 2017-03-13 NOTE — PROGRESS NOTES
Subjective:      Patient ID: Reji Gates is a 56 y.o. male.    Chief Complaint: Generalized Body Aches; Fever; Cough; and Chest Congestion    HPI:     Review of Systems    Objective:   /84 (BP Location: Left arm, Patient Position: Sitting, BP Method: Manual)  Pulse 86  Temp 99.5 °F (37.5 °C) (Oral)   Resp 20  Ht 6' (1.829 m)  Wt 77 kg (169 lb 12.1 oz)  SpO2 99%  BMI 23.02 kg/m2    Physical Exam    Assessment:     1. Flu syndrome      Plan:     Flu syndrome  -     oseltamivir (TAMIFLU) 75 MG capsule; Take 1 capsule (75 mg total) by mouth 2 (two) times daily.  Dispense: 10 capsule; Refill: 0  -     guaifenesin-codeine 100-10 mg/5 ml (TUSSI-ORGANIDIN NR)  mg/5 mL syrup; Take 5 mLs by mouth 3 (three) times daily as needed for Cough or Congestion.  Dispense: 180 mL; Refill: 0

## 2017-05-03 RX ORDER — AZELASTINE HYDROCHLORIDE 0.5 MG/ML
1 SOLUTION/ DROPS OPHTHALMIC DAILY
Qty: 6 ML | Refills: 2 | Status: SHIPPED | OUTPATIENT
Start: 2017-05-03 | End: 2018-11-20 | Stop reason: SDUPTHER

## 2017-07-13 DIAGNOSIS — J30.2 SEASONAL ALLERGIC RHINITIS: ICD-10-CM

## 2017-07-13 DIAGNOSIS — E29.1 HYPOGONADISM IN MALE: Primary | ICD-10-CM

## 2017-07-13 DIAGNOSIS — B20 HIV DISEASE: ICD-10-CM

## 2017-07-13 RX ORDER — MOMETASONE FUROATE 50 UG/1
SPRAY, METERED NASAL
Qty: 17 G | Refills: 3 | Status: SHIPPED | OUTPATIENT
Start: 2017-07-13 | End: 2017-12-11 | Stop reason: SDUPTHER

## 2017-07-31 LAB
ALBUMIN SERPL-MCNC: 4.1 G/DL (ref 3.6–5.1)
ALBUMIN/GLOB SERPL: 1.9 (CALC) (ref 1–2.5)
ALP SERPL-CCNC: 119 U/L (ref 40–115)
ALT SERPL-CCNC: 61 U/L (ref 9–46)
APPEARANCE UR: CLEAR
AST SERPL-CCNC: 40 U/L (ref 10–35)
BASOPHILS # BLD AUTO: 40 CELLS/UL (ref 0–200)
BASOPHILS NFR BLD AUTO: 0.8 %
BILIRUB SERPL-MCNC: 0.3 MG/DL (ref 0.2–1.2)
BILIRUB UR QL STRIP: NEGATIVE
BUN SERPL-MCNC: 22 MG/DL (ref 7–25)
BUN/CREAT SERPL: ABNORMAL (CALC) (ref 6–22)
CALCIUM SERPL-MCNC: 9.2 MG/DL (ref 8.6–10.3)
CD3+CD4+ CELLS # BLD: 661 CELLS/UL (ref 490–1740)
CD3+CD4+ CELLS NFR BLD: 34 % (ref 30–61)
CD3+CD4+ CELLS/CD3+CD8+ CLL BLD: 1.56 % (ref 0.86–5)
CD3+CD8+ CELLS # BLD: 423 CELLS/UL (ref 180–1170)
CD3+CD8+ CELLS NFR BLD: 22 % (ref 12–42)
CHLORIDE SERPL-SCNC: 107 MMOL/L (ref 98–110)
CHOLEST SERPL-MCNC: 140 MG/DL (ref 125–200)
CHOLEST/HDLC SERPL: 4.7 (CALC)
CO2 SERPL-SCNC: 25 MMOL/L (ref 20–31)
COLOR UR: YELLOW
CREAT SERPL-MCNC: 1.05 MG/DL (ref 0.7–1.33)
EOSINOPHIL # BLD AUTO: 130 CELLS/UL (ref 15–500)
EOSINOPHIL NFR BLD AUTO: 2.6 %
ERYTHROCYTE [DISTWIDTH] IN BLOOD BY AUTOMATED COUNT: 12.9 % (ref 11–15)
GFR SERPL CREATININE-BSD FRML MDRD: 78 ML/MIN/1.73M2
GLOBULIN SER CALC-MCNC: 2.2 G/DL (CALC) (ref 1.9–3.7)
GLUCOSE SERPL-MCNC: 100 MG/DL (ref 65–99)
GLUCOSE UR QL STRIP: NEGATIVE
HCT VFR BLD AUTO: 44.9 % (ref 38.5–50)
HDLC SERPL-MCNC: 30 MG/DL
HGB BLD-MCNC: 15.3 G/DL (ref 13.2–17.1)
HGB UR QL STRIP: NEGATIVE
HIV1 RNA # SERPL NAA+PROBE: NORMAL COPIES/ML
HIV1 RNA SERPL NAA+PROBE-LOG#: NORMAL LOG COPIES/ML
KETONES UR QL STRIP: NEGATIVE
LDLC SERPL CALC-MCNC: 79 MG/DL (CALC)
LEUKOCYTE ESTERASE UR QL STRIP: NEGATIVE
LYMPHOCYTES # BLD AUTO: 1775 CELLS/UL (ref 850–3900)
LYMPHOCYTES # BLD AUTO: 1940 CELLS/UL (ref 850–3900)
LYMPHOCYTES NFR BLD AUTO: 35.5 %
MCH RBC QN AUTO: 31.2 PG (ref 27–33)
MCHC RBC AUTO-ENTMCNC: 34.1 G/DL (ref 32–36)
MCV RBC AUTO: 91.4 FL (ref 80–100)
MONOCYTES # BLD AUTO: 460 CELLS/UL (ref 200–950)
MONOCYTES NFR BLD AUTO: 9.2 %
NEUTROPHILS # BLD AUTO: 2595 CELLS/UL (ref 1500–7800)
NEUTROPHILS NFR BLD AUTO: 51.9 %
NITRITE UR QL STRIP: NEGATIVE
NONHDLC SERPL-MCNC: 110 MG/DL (CALC)
PH UR STRIP: NORMAL [PH] (ref 5–8)
PLATELET # BLD AUTO: 246 THOUSAND/UL (ref 140–400)
PMV BLD REES-ECKER: 11.1 FL (ref 7.5–12.5)
POTASSIUM SERPL-SCNC: 4.5 MMOL/L (ref 3.5–5.3)
PROT SERPL-MCNC: 6.3 G/DL (ref 6.1–8.1)
PROT UR QL STRIP: NEGATIVE
RBC # BLD AUTO: 4.91 MILLION/UL (ref 4.2–5.8)
RPR SER QL: REACTIVE
RPR SER-TITR: ABNORMAL {TITER}
SODIUM SERPL-SCNC: 140 MMOL/L (ref 135–146)
SP GR UR STRIP: 1.02 (ref 1–1.03)
T PALLIDUM AB SER QL IF: REACTIVE
TESTOST FREE SERPL-MCNC: 32.3 PG/ML (ref 35–155)
TESTOST SERPL-MCNC: 354 NG/DL (ref 250–1100)
TRIGL SERPL-MCNC: 154 MG/DL
TSH SERPL-ACNC: 2.99 MIU/L (ref 0.4–4.5)
WBC # BLD AUTO: 5 THOUSAND/UL (ref 3.8–10.8)

## 2017-08-08 ENCOUNTER — OFFICE VISIT (OUTPATIENT)
Dept: INTERNAL MEDICINE | Facility: CLINIC | Age: 57
End: 2017-08-08
Payer: COMMERCIAL

## 2017-08-08 VITALS
SYSTOLIC BLOOD PRESSURE: 112 MMHG | BODY MASS INDEX: 24.41 KG/M2 | RESPIRATION RATE: 18 BRPM | HEIGHT: 72 IN | TEMPERATURE: 99 F | WEIGHT: 180.25 LBS | OXYGEN SATURATION: 98 % | HEART RATE: 80 BPM | DIASTOLIC BLOOD PRESSURE: 60 MMHG

## 2017-08-08 DIAGNOSIS — E29.1 HYPOGONADISM IN MALE: Chronic | ICD-10-CM

## 2017-08-08 DIAGNOSIS — A53.9: ICD-10-CM

## 2017-08-08 DIAGNOSIS — M19.90 ARTHRITIS: ICD-10-CM

## 2017-08-08 DIAGNOSIS — B20 HIV DISEASE: Primary | Chronic | ICD-10-CM

## 2017-08-08 PROCEDURE — 99214 OFFICE O/P EST MOD 30 MIN: CPT | Mod: S$GLB,,, | Performed by: INTERNAL MEDICINE

## 2017-08-08 PROCEDURE — 3008F BODY MASS INDEX DOCD: CPT | Mod: S$GLB,,, | Performed by: INTERNAL MEDICINE

## 2017-08-08 RX ORDER — TAMSULOSIN HYDROCHLORIDE 0.4 MG/1
1 CAPSULE ORAL DAILY
Refills: 0 | COMMUNITY
Start: 2017-07-28 | End: 2018-09-19 | Stop reason: SDUPTHER

## 2017-08-08 NOTE — PROGRESS NOTES
Subjective:      Patient ID: Reji Gates is a 56 y.o. male.    Chief Complaint: Results (lab results); Medication Refill; and Hand Pain (both hands )    HPI: 56y/oWM with:  - painful right hand/fingers.  Sporatically.  -reviewed all labs with him:  RPR coming down 1:32 was 1:256  -CD4 661  HIV viral load non detected.  Lipids good, except for a low HDL.      Review of Systems   Constitutional: Negative.    HENT: Negative.    Eyes: Negative.    Respiratory: Negative.    Cardiovascular: Negative.    Gastrointestinal: Negative.    Endocrine: Negative.    Genitourinary: Negative.    Musculoskeletal: Negative.    Skin: Negative.    Allergic/Immunologic: Negative.    Neurological: Negative.    Hematological: Negative.    Psychiatric/Behavioral: Negative.        Objective:   /60 (BP Location: Left arm, Patient Position: Sitting, BP Method: Manual)   Pulse 80   Temp 98.6 °F (37 °C) (Oral)   Resp 18   Ht 6' (1.829 m)   Wt 81.8 kg (180 lb 3.6 oz)   SpO2 98%   BMI 24.44 kg/m²     Physical Exam   Constitutional: He is oriented to person, place, and time. He appears well-developed and well-nourished.   HENT:   Head: Normocephalic and atraumatic.   Right Ear: External ear normal.   Left Ear: External ear normal.   Nose: Nose normal.   Mouth/Throat: Oropharynx is clear and moist.   Eyes: Conjunctivae are normal. Pupils are equal, round, and reactive to light.   Cardiovascular: Normal rate, regular rhythm and normal heart sounds.    Pulmonary/Chest: Effort normal and breath sounds normal.   Abdominal: Soft. Bowel sounds are normal.   Musculoskeletal: Normal range of motion. He exhibits no edema or deformity.   Neurological: He is alert and oriented to person, place, and time.   Skin: Skin is warm and dry.   Psychiatric: He has a normal mood and affect. His behavior is normal.   Nursing note and vitals reviewed.      Assessment:     1. HIV disease    2. Hypogonadism in male    3. Lues    4. Arthritis    Stable  Plan:      HIV disease  -     CD4 T-[place] Cells; Future; Expected date: 12/06/2017  -     CBC auto differential; Future; Expected date: 08/08/2017  -     Comprehensive metabolic panel; Future; Expected date: 08/08/2017  -     HIV RNA, quantitative, PCR; Future; Expected date: 08/08/2017    Hypogonadism in male    Lues  -     RPR; Future; Expected date: 08/08/2017    Arthritis      See Ortho of his choice.

## 2017-08-09 DIAGNOSIS — J30.9 ALLERGIC RHINITIS DUE TO ALLERGEN: ICD-10-CM

## 2017-08-09 RX ORDER — MONTELUKAST SODIUM 10 MG/1
TABLET ORAL
Qty: 30 TABLET | Refills: 0 | Status: SHIPPED | OUTPATIENT
Start: 2017-08-09 | End: 2018-05-11 | Stop reason: SDUPTHER

## 2017-09-11 RX ORDER — MONTELUKAST SODIUM 10 MG/1
10 TABLET ORAL DAILY
Qty: 30 TABLET | Refills: 11 | Status: SHIPPED | OUTPATIENT
Start: 2017-09-11 | End: 2017-12-11 | Stop reason: SDUPTHER

## 2017-11-21 ENCOUNTER — TELEPHONE (OUTPATIENT)
Dept: INTERNAL MEDICINE | Facility: CLINIC | Age: 57
End: 2017-11-21

## 2017-11-21 DIAGNOSIS — E78.5 DYSLIPIDEMIA: Primary | ICD-10-CM

## 2017-11-21 NOTE — TELEPHONE ENCOUNTER
----- Message from Jo Samson sent at 11/21/2017  1:19 PM CST -----  Contact: patient   Patient would like orders for labs to go to [facility] on [address]. He wants to make sure he is testing for HIV, Testosterone and Syphilis. Please call [phone] when done.

## 2017-11-30 LAB
ALBUMIN SERPL-MCNC: 4.1 G/DL (ref 3.6–5.1)
ALBUMIN/GLOB SERPL: 1.7 (CALC) (ref 1–2.5)
ALP SERPL-CCNC: 111 U/L (ref 40–115)
ALT SERPL-CCNC: 40 U/L (ref 9–46)
AST SERPL-CCNC: 31 U/L (ref 10–35)
BASOPHILS # BLD AUTO: 38 CELLS/UL (ref 0–200)
BASOPHILS NFR BLD AUTO: 0.7 %
BILIRUB SERPL-MCNC: 0.4 MG/DL (ref 0.2–1.2)
BUN SERPL-MCNC: 27 MG/DL (ref 7–25)
BUN/CREAT SERPL: 25 (CALC) (ref 6–22)
CALCIUM SERPL-MCNC: 9.4 MG/DL (ref 8.6–10.3)
CD3+CD4+ CELLS # BLD: 871 CELLS/UL (ref 490–1740)
CD3+CD4+ CELLS NFR BLD: 45 % (ref 30–61)
CHLORIDE SERPL-SCNC: 107 MMOL/L (ref 98–110)
CHOLEST SERPL-MCNC: 145 MG/DL
CHOLEST/HDLC SERPL: 4.7 (CALC)
CO2 SERPL-SCNC: 29 MMOL/L (ref 20–31)
CREAT SERPL-MCNC: 1.1 MG/DL (ref 0.7–1.33)
EOSINOPHIL # BLD AUTO: 130 CELLS/UL (ref 15–500)
EOSINOPHIL NFR BLD AUTO: 2.4 %
ERYTHROCYTE [DISTWIDTH] IN BLOOD BY AUTOMATED COUNT: 13 % (ref 11–15)
GFR SERPL CREATININE-BSD FRML MDRD: 74 ML/MIN/1.73M2
GLOBULIN SER CALC-MCNC: 2.4 G/DL (CALC) (ref 1.9–3.7)
GLUCOSE SERPL-MCNC: 95 MG/DL (ref 65–99)
HCT VFR BLD AUTO: 46.8 % (ref 38.5–50)
HDLC SERPL-MCNC: 31 MG/DL
HGB BLD-MCNC: 15.2 G/DL (ref 13.2–17.1)
HIV1 RNA # SERPL NAA+PROBE: NORMAL COPIES/ML
HIV1 RNA SERPL NAA+PROBE-LOG#: NORMAL LOG COPIES/ML
LDLC SERPL CALC-MCNC: 86 MG/DL (CALC)
LYMPHOCYTES # BLD AUTO: 1906 CELLS/UL (ref 850–3900)
LYMPHOCYTES # BLD AUTO: 1953 CELLS/UL (ref 850–3900)
LYMPHOCYTES NFR BLD AUTO: 35.3 %
MCH RBC QN AUTO: 30.1 PG (ref 27–33)
MCHC RBC AUTO-ENTMCNC: 32.5 G/DL (ref 32–36)
MCV RBC AUTO: 92.7 FL (ref 80–100)
MONOCYTES # BLD AUTO: 524 CELLS/UL (ref 200–950)
MONOCYTES NFR BLD AUTO: 9.7 %
NEUTROPHILS # BLD AUTO: 2803 CELLS/UL (ref 1500–7800)
NEUTROPHILS NFR BLD AUTO: 51.9 %
NONHDLC SERPL-MCNC: 114 MG/DL (CALC)
PLATELET # BLD AUTO: 233 THOUSAND/UL (ref 140–400)
PMV BLD REES-ECKER: 10.8 FL (ref 7.5–12.5)
POTASSIUM SERPL-SCNC: 4.4 MMOL/L (ref 3.5–5.3)
PROT SERPL-MCNC: 6.5 G/DL (ref 6.1–8.1)
RBC # BLD AUTO: 5.05 MILLION/UL (ref 4.2–5.8)
RPR SER QL: REACTIVE
RPR SER-TITR: ABNORMAL {TITER}
SODIUM SERPL-SCNC: 141 MMOL/L (ref 135–146)
TRIGL SERPL-MCNC: 179 MG/DL
WBC # BLD AUTO: 5.4 THOUSAND/UL (ref 3.8–10.8)

## 2017-12-11 ENCOUNTER — OFFICE VISIT (OUTPATIENT)
Dept: INTERNAL MEDICINE | Facility: CLINIC | Age: 57
End: 2017-12-11
Payer: COMMERCIAL

## 2017-12-11 VITALS
HEIGHT: 72 IN | WEIGHT: 174.38 LBS | SYSTOLIC BLOOD PRESSURE: 120 MMHG | TEMPERATURE: 98 F | BODY MASS INDEX: 23.62 KG/M2 | OXYGEN SATURATION: 98 % | RESPIRATION RATE: 18 BRPM | HEART RATE: 71 BPM | DIASTOLIC BLOOD PRESSURE: 80 MMHG

## 2017-12-11 DIAGNOSIS — B20 HIV DISEASE: Chronic | ICD-10-CM

## 2017-12-11 DIAGNOSIS — H46.9 LEFT OPTIC NEURITIS: ICD-10-CM

## 2017-12-11 DIAGNOSIS — Z23 NEED FOR PNEUMOCOCCAL VACCINATION: ICD-10-CM

## 2017-12-11 DIAGNOSIS — J30.2 SEASONAL ALLERGIC RHINITIS, UNSPECIFIED CHRONICITY, UNSPECIFIED TRIGGER: ICD-10-CM

## 2017-12-11 DIAGNOSIS — A52.8 SYPHILIS, LATE LATENT: Primary | ICD-10-CM

## 2017-12-11 DIAGNOSIS — M19.90 ARTHRITIS: ICD-10-CM

## 2017-12-11 PROCEDURE — 99214 OFFICE O/P EST MOD 30 MIN: CPT | Mod: 25,S$GLB,, | Performed by: INTERNAL MEDICINE

## 2017-12-11 PROCEDURE — 90686 IIV4 VACC NO PRSV 0.5 ML IM: CPT | Mod: S$GLB,,, | Performed by: INTERNAL MEDICINE

## 2017-12-11 PROCEDURE — 90471 IMMUNIZATION ADMIN: CPT | Mod: 59,S$GLB,, | Performed by: INTERNAL MEDICINE

## 2017-12-11 PROCEDURE — 96372 THER/PROPH/DIAG INJ SC/IM: CPT | Mod: S$GLB,,, | Performed by: INTERNAL MEDICINE

## 2017-12-11 PROCEDURE — 99999 PR PBB SHADOW E&M-EST. PATIENT-LVL IV: CPT | Mod: PBBFAC,,, | Performed by: INTERNAL MEDICINE

## 2017-12-11 RX ORDER — AMPICILLIN 500 MG/1
1 CAPSULE ORAL 2 TIMES DAILY
COMMUNITY
Start: 2017-11-24 | End: 2017-12-11 | Stop reason: ALTCHOICE

## 2017-12-11 RX ORDER — NAPROXEN 500 MG/1
1 TABLET ORAL DAILY
COMMUNITY
Start: 2017-12-05 | End: 2018-05-11

## 2017-12-11 RX ORDER — MOMETASONE FUROATE 50 UG/1
SPRAY, METERED NASAL
Qty: 17 G | Refills: 3 | Status: SHIPPED | OUTPATIENT
Start: 2017-12-11 | End: 2018-09-19 | Stop reason: SDUPTHER

## 2017-12-11 RX ORDER — HYDROCODONE BITARTRATE AND IBUPROFEN 7.5; 2 MG/1; MG/1
1 TABLET, FILM COATED ORAL EVERY 12 HOURS
Qty: 60 TABLET | Refills: 0 | Status: SHIPPED | OUTPATIENT
Start: 2017-12-11 | End: 2018-05-11 | Stop reason: SDUPTHER

## 2017-12-11 NOTE — PROGRESS NOTES
Subjective:      Patient ID: Reji Gates is a 56 y.o. male.    Chief Complaint: Follow-up (3 month follow up); Results (lab results); and Medication Refill    HPI: 56 y.o. White male , still having difficulty with his left eye.  Upon review, on several occasions:  RPR  11/3/15    Neg  RPR   5/4/16     NEG  RPR  11/11/16  + 1:4096    ( Dr. Herring ), treated 2.4 million units IM, weekly for 3 weeks  RPR   1/31/17   + 1:256  RPR   7/17/17   +1:32    Of note 12/19/16  Had a spinal tap at [facility], neg RPR  He has begun a new relationship with a man who is discordinant,  Non on PreP, and refuses to wear condoms.      Review of Systems   Constitutional: Negative.    HENT: Negative.    Eyes: Positive for visual disturbance.   Respiratory: Negative.    Cardiovascular: Negative.    Gastrointestinal: Negative.    Endocrine: Negative.    Genitourinary: Negative.    Musculoskeletal: Negative.    Skin: Negative.    Allergic/Immunologic: Negative.    Neurological: Negative.    Hematological: Negative.    Psychiatric/Behavioral: Negative.        Objective:   /80 (BP Location: Right arm, Patient Position: Sitting, BP Method: Large (Manual))   Pulse 71   Temp 98.1 °F (36.7 °C) (Oral)   Resp 18   Ht 6' (1.829 m)   Wt 79.1 kg (174 lb 6.1 oz)   SpO2 98%   BMI 23.65 kg/m²     Physical Exam   Constitutional: He is oriented to person, place, and time. He appears well-developed and well-nourished.   HENT:   Head: Normocephalic and atraumatic.   Right Ear: External ear normal.   Left Ear: External ear normal.   Nose: Nose normal.   Mouth/Throat: Oropharynx is clear and moist.   Eyes: Conjunctivae and EOM are normal. Pupils are equal, round, and reactive to light.   Neck: Normal range of motion. Neck supple.   Cardiovascular: Normal rate, regular rhythm and normal heart sounds.    Pulmonary/Chest: Effort normal and breath sounds normal.   Abdominal: Soft. Bowel sounds are normal.   Musculoskeletal: Normal range of motion.    Neurological: He is alert and oriented to person, place, and time.   Skin: Skin is warm and dry.   Psychiatric: He has a normal mood and affect. His behavior is normal. Judgment and thought content normal.   Nursing note and vitals reviewed.      Assessment:     1. Syphilis, late latent    2. Left optic neuritis    3. HIV disease    4. Arthritis    5. Need for pneumococcal vaccination    6. Seasonal allergic rhinitis, unspecified chronicity, unspecified trigger    Very long discussion about syphilis,HIV,contagiousness,need for PreP,condoms,testing.  Referred to [facility].  Plan:     Syphilis, late latent  -     penicillin G benzathine (BICILLIN LA) injection 1.2 Million Units; Inject 2 mLs (1.2 Million Units total) into the muscle one time.  -     penicillin G benzathine (BICILLIN LA) injection 1.2 Million Units; Inject 2 mLs (1.2 Million Units total) into the muscle one time.  -     penicillin G benzathine (BICILLIN LA) injection 2.4 Million Units; Inject 4 mLs (2.4 Million Units total) into the muscle once a week.  Needs this weekly for total 3 weeks.  Left optic neuritis  -     penicillin G benzathine (BICILLIN LA) injection 1.2 Million Units; Inject 2 mLs (1.2 Million Units total) into the muscle one time.  -     penicillin G benzathine (BICILLIN LA) injection 1.2 Million Units; Inject 2 mLs (1.2 Million Units total) into the muscle one time.    HIV disease  -     qetlagex-eqlzpvpd-svzkst-tenof, STRIBILD, [id number] mg (STRIBILD) [phone] mg per tablet; Take 1 tablet by mouth once daily.  Dispense: 90 tablet; Refill: 3  -     penicillin G benzathine (BICILLIN LA) injection 2.4 Million Units; Inject 4 mLs (2.4 Million Units total) into the muscle once a week.    Arthritis  -     hydrocodone-ibuprofen 7.5-200 mg (VICOPROFEN) 7.5-200 mg per tablet; Take 1 tablet by mouth every 12 (twelve) hours.  Dispense: 60 tablet; Refill: 0    Need for pneumococcal vaccination  -     Pneumococcal Conjugate  Vaccine (13 Valent) (IM)    Seasonal allergic rhinitis, unspecified chronicity, unspecified trigger  -     mometasone (NASONEX) 50 mcg/actuation nasal spray; USE 2 SPRAYS NASALLY ONE TIME DAILY  Dispense: 17 g; Refill: 3    Other orders  -     Influenza - Quadrivalent (3 years & older) (PF)

## 2017-12-18 ENCOUNTER — CLINICAL SUPPORT (OUTPATIENT)
Dept: INTERNAL MEDICINE | Facility: CLINIC | Age: 57
End: 2017-12-18
Payer: COMMERCIAL

## 2017-12-18 DIAGNOSIS — A53.9: Primary | ICD-10-CM

## 2017-12-18 DIAGNOSIS — Z23 NEED FOR VACCINATION WITH 13-POLYVALENT PNEUMOCOCCAL CONJUGATE VACCINE: ICD-10-CM

## 2017-12-18 PROCEDURE — 96372 THER/PROPH/DIAG INJ SC/IM: CPT | Mod: S$GLB,,, | Performed by: INTERNAL MEDICINE

## 2017-12-18 PROCEDURE — 90670 PCV13 VACCINE IM: CPT | Mod: S$GLB,,, | Performed by: INTERNAL MEDICINE

## 2017-12-18 PROCEDURE — 90471 IMMUNIZATION ADMIN: CPT | Mod: 59,S$GLB,, | Performed by: INTERNAL MEDICINE

## 2017-12-26 ENCOUNTER — CLINICAL SUPPORT (OUTPATIENT)
Dept: INTERNAL MEDICINE | Facility: CLINIC | Age: 57
End: 2017-12-26
Payer: COMMERCIAL

## 2017-12-26 DIAGNOSIS — A53.9: Primary | ICD-10-CM

## 2017-12-26 PROCEDURE — 96372 THER/PROPH/DIAG INJ SC/IM: CPT | Mod: S$GLB,,, | Performed by: INTERNAL MEDICINE

## 2018-02-21 ENCOUNTER — TELEPHONE (OUTPATIENT)
Dept: INTERNAL MEDICINE | Facility: CLINIC | Age: 58
End: 2018-02-21

## 2018-02-21 DIAGNOSIS — R21 RASH OF PERINEUM: Primary | ICD-10-CM

## 2018-02-21 NOTE — TELEPHONE ENCOUNTER
Patient states that he has a rash on his anus and wants to know if you can order an STI panel. Please advise

## 2018-02-21 NOTE — TELEPHONE ENCOUNTER
----- Message from Jenny Das sent at 2/20/2018 10:03 AM CST -----  Contact: self, [phone]  Patient requests to speak with nurse to schedule blood work. Please advise.

## 2018-02-23 ENCOUNTER — TELEPHONE (OUTPATIENT)
Dept: INTERNAL MEDICINE | Facility: CLINIC | Age: 58
End: 2018-02-23

## 2018-02-23 DIAGNOSIS — Z20.2 STD EXPOSURE: Primary | ICD-10-CM

## 2018-02-23 NOTE — TELEPHONE ENCOUNTER
----- Message from Doretha Caba sent at 2/23/2018  9:45 AM CST -----  Contact: [phone]/self  Pt its requesting to speak with Linda . Pt didn't want to leave a reason . Please advise

## 2018-02-23 NOTE — TELEPHONE ENCOUNTER
Spoke with patient and explained to him that Dr. Mike ordered an RPR for him to get done. Patient states that he started getting little white bumps in his mouth but went away that afternoon and then came back this morning. When they come back they are in different spots in the mouth. The ones around his rectal are red and itch. They are moist. They are not blisters. Patient feels like syphilis but it doesn't look like syphilis. Patient going get the lab work done now. Please advise

## 2018-02-26 LAB
RPR SER QL: REACTIVE
RPR SER-TITR: ABNORMAL {TITER}

## 2018-04-11 ENCOUNTER — PATIENT MESSAGE (OUTPATIENT)
Dept: INTERNAL MEDICINE | Facility: CLINIC | Age: 58
End: 2018-04-11

## 2018-04-11 DIAGNOSIS — A53.9: ICD-10-CM

## 2018-04-11 DIAGNOSIS — H46.9 LEFT OPTIC NEURITIS: ICD-10-CM

## 2018-04-11 DIAGNOSIS — B20 HIV DISEASE: Primary | Chronic | ICD-10-CM

## 2018-04-25 LAB
ALBUMIN SERPL-MCNC: 4.4 G/DL (ref 3.6–5.1)
ALBUMIN/GLOB SERPL: 1.8 (CALC) (ref 1–2.5)
ALP SERPL-CCNC: 103 U/L (ref 40–115)
ALT SERPL-CCNC: 35 U/L (ref 9–46)
AST SERPL-CCNC: 33 U/L (ref 10–35)
BASOPHILS # BLD AUTO: 31 CELLS/UL (ref 0–200)
BASOPHILS NFR BLD AUTO: 0.6 %
BILIRUB SERPL-MCNC: 0.6 MG/DL (ref 0.2–1.2)
BUN SERPL-MCNC: 23 MG/DL (ref 7–25)
BUN/CREAT SERPL: NORMAL (CALC) (ref 6–22)
CALCIUM SERPL-MCNC: 9.3 MG/DL (ref 8.6–10.3)
CD3+CD4+ CELLS # BLD: 698 CELLS/UL (ref 490–1740)
CD3+CD4+ CELLS NFR BLD: 47 % (ref 30–61)
CHLORIDE SERPL-SCNC: 106 MMOL/L (ref 98–110)
CHOLEST SERPL-MCNC: 166 MG/DL
CHOLEST/HDLC SERPL: 4.3 (CALC)
CO2 SERPL-SCNC: 27 MMOL/L (ref 20–31)
CREAT SERPL-MCNC: 1.07 MG/DL (ref 0.7–1.33)
EOSINOPHIL # BLD AUTO: 61 CELLS/UL (ref 15–500)
EOSINOPHIL NFR BLD AUTO: 1.2 %
ERYTHROCYTE [DISTWIDTH] IN BLOOD BY AUTOMATED COUNT: 12.7 % (ref 11–15)
GFR SERPL CREATININE-BSD FRML MDRD: 76 ML/MIN/1.73M2
GLOBULIN SER CALC-MCNC: 2.4 G/DL (CALC) (ref 1.9–3.7)
GLUCOSE SERPL-MCNC: 90 MG/DL (ref 65–99)
HCT VFR BLD AUTO: 45.5 % (ref 38.5–50)
HDLC SERPL-MCNC: 39 MG/DL
HGB BLD-MCNC: 15.2 G/DL (ref 13.2–17.1)
HIV1 RNA # SERPL NAA+PROBE: NORMAL COPIES/ML
HIV1 RNA SERPL NAA+PROBE-LOG#: NORMAL LOG COPIES/ML
LDLC SERPL CALC-MCNC: 110 MG/DL (CALC)
LYMPHOCYTES # BLD AUTO: 1428 CELLS/UL (ref 850–3900)
LYMPHOCYTES # BLD AUTO: 1489 CELLS/UL (ref 850–3900)
LYMPHOCYTES NFR BLD AUTO: 28 %
MCH RBC QN AUTO: 30.2 PG (ref 27–33)
MCHC RBC AUTO-ENTMCNC: 33.4 G/DL (ref 32–36)
MCV RBC AUTO: 90.3 FL (ref 80–100)
MONOCYTES # BLD AUTO: 398 CELLS/UL (ref 200–950)
MONOCYTES NFR BLD AUTO: 7.8 %
NEUTROPHILS # BLD AUTO: 3182 CELLS/UL (ref 1500–7800)
NEUTROPHILS NFR BLD AUTO: 62.4 %
NONHDLC SERPL-MCNC: 127 MG/DL (CALC)
PLATELET # BLD AUTO: 222 THOUSAND/UL (ref 140–400)
PMV BLD REES-ECKER: 10.9 FL (ref 7.5–12.5)
POTASSIUM SERPL-SCNC: 4.6 MMOL/L (ref 3.5–5.3)
PROT SERPL-MCNC: 6.8 G/DL (ref 6.1–8.1)
RBC # BLD AUTO: 5.04 MILLION/UL (ref 4.2–5.8)
RPR SER QL: REACTIVE
RPR SER-TITR: ABNORMAL {TITER}
SODIUM SERPL-SCNC: 140 MMOL/L (ref 135–146)
TRIGL SERPL-MCNC: 80 MG/DL
WBC # BLD AUTO: 5.1 THOUSAND/UL (ref 3.8–10.8)

## 2018-05-11 ENCOUNTER — OFFICE VISIT (OUTPATIENT)
Dept: INTERNAL MEDICINE | Facility: CLINIC | Age: 58
End: 2018-05-11
Payer: COMMERCIAL

## 2018-05-11 VITALS
HEART RATE: 75 BPM | DIASTOLIC BLOOD PRESSURE: 80 MMHG | OXYGEN SATURATION: 98 % | RESPIRATION RATE: 18 BRPM | HEIGHT: 72 IN | BODY MASS INDEX: 23.03 KG/M2 | WEIGHT: 170 LBS | SYSTOLIC BLOOD PRESSURE: 130 MMHG

## 2018-05-11 DIAGNOSIS — M19.90 ARTHRITIS: ICD-10-CM

## 2018-05-11 DIAGNOSIS — F51.02 ADJUSTMENT INSOMNIA: Chronic | ICD-10-CM

## 2018-05-11 DIAGNOSIS — H46.9 OPTIC NEURITIS, LEFT: ICD-10-CM

## 2018-05-11 DIAGNOSIS — J30.9 ALLERGIC RHINITIS, UNSPECIFIED SEASONALITY, UNSPECIFIED TRIGGER: ICD-10-CM

## 2018-05-11 DIAGNOSIS — B20 HIV DISEASE: Chronic | ICD-10-CM

## 2018-05-11 PROCEDURE — 3008F BODY MASS INDEX DOCD: CPT | Mod: CPTII,S$GLB,, | Performed by: INTERNAL MEDICINE

## 2018-05-11 PROCEDURE — 99214 OFFICE O/P EST MOD 30 MIN: CPT | Mod: S$GLB,,, | Performed by: INTERNAL MEDICINE

## 2018-05-11 PROCEDURE — 99999 PR PBB SHADOW E&M-EST. PATIENT-LVL III: CPT | Mod: PBBFAC,,, | Performed by: INTERNAL MEDICINE

## 2018-05-11 RX ORDER — MONTELUKAST SODIUM 10 MG/1
10 TABLET ORAL NIGHTLY
Qty: 30 TABLET | Refills: 5 | Status: SHIPPED | OUTPATIENT
Start: 2018-05-11 | End: 2018-09-19 | Stop reason: SDUPTHER

## 2018-05-11 RX ORDER — ALPRAZOLAM 0.5 MG/1
0.5 TABLET ORAL DAILY
Qty: 30 TABLET | Refills: 3 | Status: SHIPPED | OUTPATIENT
Start: 2018-05-11 | End: 2019-05-08 | Stop reason: SDUPTHER

## 2018-05-11 RX ORDER — ACYCLOVIR 800 MG/1
800 TABLET ORAL DAILY
Qty: 90 TABLET | Refills: 3 | Status: SHIPPED | OUTPATIENT
Start: 2018-05-11 | End: 2019-06-14 | Stop reason: SDUPTHER

## 2018-05-11 RX ORDER — AZELASTINE 1 MG/ML
1 SPRAY, METERED NASAL 2 TIMES DAILY
Qty: 30 ML | Refills: 2 | Status: SHIPPED | OUTPATIENT
Start: 2018-05-11 | End: 2018-08-28 | Stop reason: CLARIF

## 2018-05-11 RX ORDER — HYDROCODONE BITARTRATE AND IBUPROFEN 7.5; 2 MG/1; MG/1
1 TABLET, FILM COATED ORAL EVERY 12 HOURS
Qty: 60 TABLET | Refills: 0 | Status: SHIPPED | OUTPATIENT
Start: 2018-05-11 | End: 2019-05-08 | Stop reason: SDUPTHER

## 2018-05-11 NOTE — PROGRESS NOTES
Subjective:      Patient ID: Reji Gates is a 57 y.o. male.    Chief Complaint: Follow-up (5 month follow up); Medication Refill; Results (lab results); Hernia (groin ); and Pain (pain in right hand)    HPI: 57 y.o. White male, has occasional pain in his wrists.  This is an occupational hazard.   No further rashes.  Still has vasomotor rhinitis/allergies.         Reviewed labs with pt:  /18 1229 WBC 5.1 - Final result   04/24/18 1229 RBC 5.04 - Final result   04/24/18 1229 HGB 15.2 - Final result   04/24/18 1229 HCT 45.5 - Final result   04/24/18 1229 MCH 30.2 - Final result   04/24/18 1229 RDW 12.7 - Final result   04/24/18 1229  - Final result   04/24/18 1229 MPV 10.9 - Final result   04/24/18 1229 GLU 90 - Final result   04/24/18 1229 BUN 23 - Final result   04/24/18 1229 CREATININE 1.07 - Final result   04/24/18 1229 CALCIUM 9.3 - Final result   04/24/18 1229  - Final result   04/24/18 1229 K 4.6 - Final result   04/24/18 1229  - Final result   04/24/18 1229 PROT 6.8 - Final result   04/24/18 1229 ALBUMIN 4.4 - Final result   04/24/18 1229 BILITOT 0.6 - Final result   04/24/18 1229 AST 33 - Final result   04/24/18 1229 ALKPHOS 103 - Final result   04/24/18 1229 CO2 27 - Final result   04/24/18 1229 ALT 35 - Final result   04/24/18 1229 EGFRNONAA 76 - Final result   04/24/18 1229 ESTGFRAFRICA 88 - Final result   04/24/18 1229         CD4 698    HIV viral load  Non detected.  RPR 1:16 ( decreasing).    Review of Systems   Constitutional: Negative for activity change and unexpected weight change.   HENT: Positive for hearing loss. Negative for rhinorrhea and trouble swallowing.    Eyes: Positive for visual disturbance. Negative for discharge.   Respiratory: Positive for wheezing.    Cardiovascular: Negative for chest pain and palpitations.   Gastrointestinal: Negative for blood in stool, constipation, diarrhea and vomiting.   Endocrine: Positive for polyuria. Negative for polydipsia.    Genitourinary: Positive for urgency. Negative for difficulty urinating and hematuria.   Musculoskeletal: Positive for arthralgias, joint swelling and neck pain.   Neurological: Negative for weakness and headaches.   Psychiatric/Behavioral: Positive for confusion and dysphoric mood.       Objective:   /80 (BP Location: Left arm, Patient Position: Sitting, BP Method: Large (Manual))   Pulse 75   Resp 18   Ht 6' (1.829 m)   Wt 77.1 kg (170 lb)   SpO2 98%   BMI 23.06 kg/m²     Physical Exam   Constitutional: He is oriented to person, place, and time. He appears well-developed and well-nourished.   HENT:   Head: Normocephalic and atraumatic.   Right Ear: External ear normal.   Left Ear: External ear normal.   Nose: Nose normal.   Mouth/Throat: Oropharynx is clear and moist.   Eyes: EOM are normal. Pupils are equal, round, and reactive to light.   Neck: Normal range of motion. Neck supple.   Cardiovascular: Normal rate, regular rhythm and normal heart sounds.    Pulmonary/Chest: Effort normal and breath sounds normal.   Abdominal: Soft. Bowel sounds are normal.   Musculoskeletal: Normal range of motion.   Neurological: He is alert and oriented to person, place, and time.   Skin: Skin is warm and dry.   Psychiatric: He has a normal mood and affect. His behavior is normal.   Nursing note and vitals reviewed.      Assessment:     1. Adjustment insomnia    2. Allergic rhinitis, unspecified seasonality, unspecified trigger    3. Arthritis    4. HIV disease    5. Optic neuritis, left    Doing well, stable.  Plan:     Adjustment insomnia  -     ALPRAZolam (XANAX) 0.5 MG tablet; Take 1 tablet (0.5 mg total) by mouth once daily.  Dispense: 30 tablet; Refill: 3    Allergic rhinitis, unspecified seasonality, unspecified trigger  -     azelastine (ASTELIN) 137 mcg (0.1 %) nasal spray; 1 spray (137 mcg total) by Nasal route 2 (two) times daily.  Dispense: 30 mL; Refill: 2  -     montelukast (SINGULAIR) 10 mg tablet; Take  1 tablet (10 mg total) by mouth every evening.  Dispense: 30 tablet; Refill: 5    Arthritis  -     hydrocodone-ibuprofen 7.5-200 mg (VICOPROFEN) 7.5-200 mg per tablet; Take 1 tablet by mouth every 12 (twelve) hours.  Dispense: 60 tablet; Refill: 0    HIV disease  -     miyyxros-oxcdxddt-rluauz-tenof, STRIBILD, [id number] mg (STRIBILD) [phone] mg per tablet; Take 1 tablet by mouth once daily.  Dispense: 90 tablet; Refill: 3  -     CBC auto differential; Future; Expected date: 09/08/2018  -     HIV RNA, quantitative, PCR; Future; Expected date: 09/11/2018  -     Comprehensive metabolic panel; Future; Expected date: 09/08/2018  -     Urinalysis; Future; Expected date: 09/08/2018  -     Lymphocyte Subset Panel 5 T-[place]/Inducer; Future; Expected date: 09/08/2018    Optic neuritis, left  -     acyclovir (ZOVIRAX) 800 MG Tab; Take 1 tablet (800 mg total) by mouth once daily.  Dispense: 90 tablet; Refill: 3

## 2018-06-13 ENCOUNTER — OFFICE VISIT (OUTPATIENT)
Dept: URGENT CARE | Facility: CLINIC | Age: 58
End: 2018-06-13
Payer: COMMERCIAL

## 2018-06-13 VITALS
HEART RATE: 98 BPM | SYSTOLIC BLOOD PRESSURE: 135 MMHG | WEIGHT: 165 LBS | HEIGHT: 72 IN | RESPIRATION RATE: 18 BRPM | BODY MASS INDEX: 22.35 KG/M2 | OXYGEN SATURATION: 98 % | TEMPERATURE: 99 F | DIASTOLIC BLOOD PRESSURE: 86 MMHG

## 2018-06-13 DIAGNOSIS — N34.2 URETHRITIS: ICD-10-CM

## 2018-06-13 DIAGNOSIS — R30.0 DYSURIA: Primary | ICD-10-CM

## 2018-06-13 LAB
BILIRUB UR QL STRIP: NEGATIVE
GLUCOSE UR QL STRIP: NEGATIVE
KETONES UR QL STRIP: NEGATIVE
LEUKOCYTE ESTERASE UR QL STRIP: NEGATIVE
PH, POC UA: 6 (ref 5–8)
POC BLOOD, URINE: NEGATIVE
POC NITRATES, URINE: NEGATIVE
PROT UR QL STRIP: NEGATIVE
SP GR UR STRIP: 1.01 (ref 1–1.03)
UROBILINOGEN UR STRIP-ACNC: NORMAL (ref 0.3–2.2)

## 2018-06-13 PROCEDURE — 99214 OFFICE O/P EST MOD 30 MIN: CPT | Mod: 25,S$GLB,, | Performed by: NURSE PRACTITIONER

## 2018-06-13 PROCEDURE — 87491 CHLMYD TRACH DNA AMP PROBE: CPT

## 2018-06-13 PROCEDURE — 87086 URINE CULTURE/COLONY COUNT: CPT

## 2018-06-13 PROCEDURE — 81003 URINALYSIS AUTO W/O SCOPE: CPT | Mod: QW,S$GLB,, | Performed by: NURSE PRACTITIONER

## 2018-06-13 NOTE — PATIENT INSTRUCTIONS
We will call you in the next 3-5 days with your results.  Please refrain from sexual intercourse at this time or use condoms to protect others.    Please get frequently tested.  Urethritis, Infection vs. Chemical (Adult Male)  You have urethritis. This means an inflammation in your urethra. The urethra is the tube that drains the urine out of your bladder through the tip of the penis. Urethritis is most often caused by a bacterial infection. The infection may be from gonorrhea, chlamydia, or another sexually transmitted disease (STD). But other things can also cause it. These things include irritation from soap, lotion, deodorant, or spermicides. The cause of your urethritis is uncertain.  Women with urethritis often don't have symptoms. Men are more likely to have symptoms. Symptoms can start within 1 week to a month or more after infection. Symptoms can include:  · Burning or pain when urinating  · Your penis feels irritated  · Pus coming from your penis  · Pain and possible swelling in one or both testicles  Urethritis caused by bacteria is treated with antibiotics. Urethritis may clear up in a few weeks or months, even without treatment. But if you don't get treatment, the bacteria that cause the infection can stay in the urethra. Even if symptoms go away, you can still have the infection. And you can spread it to others.  Your sex partner or partners also need to be treated. This is true even if they have no symptoms. You can get infected again if they aren't treated and you have sex with them. Your partner should call his or her healthcare provider to be looked at and treated.  Your urethritis may also be caused by things other than bacteria. These causes include:  · Chemical irritation from a product used in the genital area. This might be soap, lotions, deodorant, or spermicides. Symptoms usually get better within 3 days after the last time you used the product.  · Damage to the urethra caused by vigorous  sex or masturbation  · Damage to the urethra caused by inserting an object into it. This could happen during an operation in the hospital. A thin, plastic tube (catheter) is put into your bladder to let urine to drain from the bladder during the operation.  · Long-term (chronic) urethritis that lasts for weeks or months, or goes away and comes back. This kind of urethritis may be caused by a narrowed urethra or an untreated bacterial infection. You may need to see a specialist for diagnosis and treatment.  Home care  The following guidelines will help you care for yourself at home:  · Stop using any soap, lotions, or other chemicals that may cause irritation.  · If you were given antibiotics, take them until they are all gone, or until your healthcare provider tells you to stop. It's important to finish the antibiotics even if your symptoms go away. This is to make sure the infection has completely cleared up.  · Don't have sex until both you and your partner have finished all of the antibiotics, and your provider tells you that you cannot pass on the infection.  · You can take acetaminophen or ibuprofen for pain, unless you were given a different pain medicine to use. If you have chronic liver or kidney disease, talk with your provider before taking these medicines. Also talk with your provider if you've had a stomach ulcer or GI bleeding or are taking blood thinner medicines.  · Dont give aspirin to anyone under 18 years of age who is ill with a fever. It may cause severe liver damage.  · Learn about safe sex practices and use them. The safest sex is with a partner who does not have an STD and only has sex with you. Condoms can keep you from getting some STDs. These include gonorrhea, chlamydia, and HIV. But condoms are not a guarantee you will not get these diseases.  Follow-up care  Follow up with your healthcare provider, or as advised. If an STD culture was taken, call as directed for the result. If you are  diagnosed with an STD, follow up with your provider or your local health department. You should have a complete STD screening, including HIV testing. For more information, call the CDC-INFO at 379-640-4520.  When to seek medical advice  Call your healthcare provider right away if any of these occur:  · You don't start to get better after 3 days of treatment.  · You cant urinate because of the pain  · Rash or joint pain  · Painful sores on the penis  · Enlarged, painful lumps (lymph nodes) in your groin  · Testicle pain or your scrotum swells   Date Last Reviewed: 10/1/2016  © 4227-7355 [employer]. [address] [place] [address], [place] [address]. All rights reserved. This information is not intended as a substitute for professional medical care. Always follow your healthcare professional's instructions.

## 2018-06-13 NOTE — PROGRESS NOTES
Subjective:       Patient ID: Reji Gates is a 57 y.o. male.    Vitals:  height is 6' (1.829 m) and weight is 74.8 kg (165 lb). His temperature is 98.5 °F (36.9 °C). His blood pressure is 135/86 and his pulse is 98. His respiration is 18 and oxygen saturation is 98%.     Chief Complaint: Exposure to STD    Patient presents with a few days of irritation to his penis, intermittently noted when he pees.  States that it almost feels like a little piece of sand.  He and his partner are in an open relationship and he does not use condoms.  He wants to be tested for Gonorrhea and Chlamydia and would like to wait for treatment.  He is a patient of [facility], currently on [facility].  He was previously treated for syphilis a year and a half ago.  Denies sores or lesions.      Exposure to STD   The patient's primary symptoms include penile pain. The patient's pertinent negatives include no genital injury, genital itching, genital lesions, pelvic pain, penile discharge, priapism, scrotal swelling or testicular pain. This is a new problem. The current episode started in the past 7 days. The problem has been unchanged. The pain is mild. Associated symptoms include dysuria. Pertinent negatives include no abdominal pain, chest pain, chills, diarrhea, discolored urine, fever, flank pain, frequency, headaches, hematuria, joint pain, nausea, painful intercourse, rash, shortness of breath, sore throat, urgency, urinary retention or vomiting. Associated symptoms comments: Eye swelling occurred within the same time frame.. The penile discharge was clear (intermittently will notice clear discharge, but states that this is not abnormal for him). Nothing aggravates the symptoms. He has tried nothing for the symptoms. He is sexually active with multiple partners. He inconsistently uses condoms. Yes (tested for syphillis within 2 months 1-4 ratio), his partner has an STD. His past medical history is significant for HIV and syphilis.      Review of Systems   Constitution: Negative for chills and fever.   HENT: Negative for sore throat.    Eyes: Negative for blurred vision and discharge.   Cardiovascular: Negative for chest pain.   Respiratory: Negative for shortness of breath.    Skin: Negative for flushing and rash.   Musculoskeletal: Negative for back pain and joint pain.   Gastrointestinal: Negative for abdominal pain, diarrhea, nausea and vomiting.   Genitourinary: Positive for dysuria and penile pain. Negative for discharge, flank pain, frequency, genital sores, hematuria, pelvic pain, scrotal swelling, testicular pain and urgency.        Discomfort pain intermittantly   Neurological: Negative for headaches.   Psychiatric/Behavioral: The patient is not nervous/anxious.        Objective:      Physical Exam   Constitutional: He is oriented to person, place, and time. He appears well-developed and well-nourished. No distress.   HENT:   Head: Normocephalic and atraumatic.   Right Ear: External ear normal.   Left Ear: External ear normal.   Nose: Nose normal. No nasal deformity. No epistaxis.   Mouth/Throat: Oropharynx is clear and moist and mucous membranes are normal.   Eyes: Conjunctivae and lids are normal.   Neck: Trachea normal, normal range of motion and phonation normal. Neck supple.   Cardiovascular: Normal rate, regular rhythm, normal heart sounds and intact distal pulses.    Pulmonary/Chest: Effort normal and breath sounds normal.   Abdominal: Soft. Normal appearance and bowel sounds are normal. He exhibits no distension. There is no tenderness. There is no CVA tenderness.   Genitourinary: Testes normal. Circumcised. No phimosis, paraphimosis, hypospadias, penile erythema or penile tenderness. No discharge found.   Musculoskeletal: Normal range of motion.   Neurological: He is alert and oriented to person, place, and time. He has normal reflexes.   Skin: Skin is warm, dry and intact. He is not diaphoretic.   Psychiatric: He has a  normal mood and affect. His speech is normal and behavior is normal. Judgment and thought content normal. Cognition and memory are normal.   Nursing note and vitals reviewed.      Results for orders placed or performed in visit on 06/13/18   POCT Urinalysis, Dipstick, Automated, W/O Scope   Result Value Ref Range    POC Blood, Urine Negative Negative    POC Bilirubin, Urine Negative Negative    POC Urobilinogen, Urine normal 0.3 - 2.2    POC Ketones, Urine Negative Negative    POC Protein, Urine Negative Negative    POC Nitrates, Urine Negative Negative    POC Glucose, Urine Negative Negative    pH, UA 6.0 5 - 8    POC Specific Gravity, Urine 1.010 1.003 - 1.029    POC Leukocytes, Urine Negative Negative     Assessment:       1. Dysuria    2. Urethritis        Plan:         Dysuria  -     POCT Urinalysis, Dipstick, Automated, W/O Scope  -     Urine culture  -     C. trachomatis/N. gonorrhoeae by AMP DNA Urine    Urethritis      Patient Instructions   We will call you in the next 3-5 days with your results.  Please refrain from sexual intercourse at this time or use condoms to protect others.    Please get frequently tested.  Urethritis, Infection vs. Chemical (Adult Male)  You have urethritis. This means an inflammation in your urethra. The urethra is the tube that drains the urine out of your bladder through the tip of the penis. Urethritis is most often caused by a bacterial infection. The infection may be from gonorrhea, chlamydia, or another sexually transmitted disease (STD). But other things can also cause it. These things include irritation from soap, lotion, deodorant, or spermicides. The cause of your urethritis is uncertain.  Women with urethritis often don't have symptoms. Men are more likely to have symptoms. Symptoms can start within 1 week to a month or more after infection. Symptoms can include:  · Burning or pain when urinating  · Your penis feels irritated  · Pus coming from your penis  · Pain and  possible swelling in one or both testicles  Urethritis caused by bacteria is treated with antibiotics. Urethritis may clear up in a few weeks or months, even without treatment. But if you don't get treatment, the bacteria that cause the infection can stay in the urethra. Even if symptoms go away, you can still have the infection. And you can spread it to others.  Your sex partner or partners also need to be treated. This is true even if they have no symptoms. You can get infected again if they aren't treated and you have sex with them. Your partner should call his or her healthcare provider to be looked at and treated.  Your urethritis may also be caused by things other than bacteria. These causes include:  · Chemical irritation from a product used in the genital area. This might be soap, lotions, deodorant, or spermicides. Symptoms usually get better within 3 days after the last time you used the product.  · Damage to the urethra caused by vigorous sex or masturbation  · Damage to the urethra caused by inserting an object into it. This could happen during an operation in the hospital. A thin, plastic tube (catheter) is put into your bladder to let urine to drain from the bladder during the operation.  · Long-term (chronic) urethritis that lasts for weeks or months, or goes away and comes back. This kind of urethritis may be caused by a narrowed urethra or an untreated bacterial infection. You may need to see a specialist for diagnosis and treatment.  Home care  The following guidelines will help you care for yourself at home:  · Stop using any soap, lotions, or other chemicals that may cause irritation.  · If you were given antibiotics, take them until they are all gone, or until your healthcare provider tells you to stop. It's important to finish the antibiotics even if your symptoms go away. This is to make sure the infection has completely cleared up.  · Don't have sex until both you and your partner have  finished all of the antibiotics, and your provider tells you that you cannot pass on the infection.  · You can take acetaminophen or ibuprofen for pain, unless you were given a different pain medicine to use. If you have chronic liver or kidney disease, talk with your provider before taking these medicines. Also talk with your provider if you've had a stomach ulcer or GI bleeding or are taking blood thinner medicines.  · Dont give aspirin to anyone under 18 years of age who is ill with a fever. It may cause severe liver damage.  · Learn about safe sex practices and use them. The safest sex is with a partner who does not have an STD and only has sex with you. Condoms can keep you from getting some STDs. These include gonorrhea, chlamydia, and HIV. But condoms are not a guarantee you will not get these diseases.  Follow-up care  Follow up with your healthcare provider, or as advised. If an STD culture was taken, call as directed for the result. If you are diagnosed with an STD, follow up with your provider or your local health department. You should have a complete STD screening, including HIV testing. For more information, call the CDC-INFO at 140-388-1332.  When to seek medical advice  Call your healthcare provider right away if any of these occur:  · You don't start to get better after 3 days of treatment.  · You cant urinate because of the pain  · Rash or joint pain  · Painful sores on the penis  · Enlarged, painful lumps (lymph nodes) in your groin  · Testicle pain or your scrotum swells   Date Last Reviewed: 10/1/2016  © 6420-5121 [employer]. [address] [place] [address], [place] [address]. All rights reserved. This information is not intended as a substitute for professional medical care. Always follow your healthcare professional's instructions.

## 2018-06-14 LAB
BACTERIA UR CULT: NO GROWTH
C TRACH DNA SPEC QL NAA+PROBE: NOT DETECTED
N GONORRHOEA DNA SPEC QL NAA+PROBE: NOT DETECTED

## 2018-06-15 ENCOUNTER — TELEPHONE (OUTPATIENT)
Dept: URGENT CARE | Facility: CLINIC | Age: 58
End: 2018-06-15

## 2018-06-15 NOTE — TELEPHONE ENCOUNTER
I spoke with the patient and I let him know that all of his tests were negative.  No complaints or concerns.  Follow up for continued symptoms as needed.

## 2018-08-20 NOTE — H&P (VIEW-ONLY)
History & Physical    SUBJECTIVE:     History of Present Illness:  Patient is a 57 y.o. male with symptomatic right inguinal hernia.     Review of patient's allergies indicates:  No Known Allergies    Current Outpatient Medications   Medication Sig Dispense Refill    acyclovir (ZOVIRAX) 800 MG Tab Take 1 tablet (800 mg total) by mouth once daily. 90 tablet 3    ALPRAZolam (XANAX) 0.5 MG tablet Take 1 tablet (0.5 mg total) by mouth once daily. 30 tablet 3    ascorbic acid (VITAMIN C) 1000 MG tablet Take 1,000 mg by mouth 2 (two) times daily.      azelastine (ASTELIN) 137 mcg (0.1 %) nasal spray 1 spray (137 mcg total) by Nasal route 2 (two) times daily. 30 mL 2    azelastine (OPTIVAR) 0.05 % ophthalmic solution Place 1 drop into both eyes once daily. 6 mL 2    cetirizine (ZYRTEC) 10 MG tablet Take 10 mg by mouth once daily.      clindamycin phosphate 1% (CLINDAGEL) 1 % gel APPLY BID  2    jpepfiwg-wefjftli-bwnxnz-tenof, STRIBILD, [phone] mg (STRIBILD) [phone] mg per tablet Take 1 tablet by mouth once daily. 90 tablet 3    FISH,SAF,FLX,BRG OILS/O3,6,9#2 (FISH-FLAX-BORAGE OIL ORAL) Take 1 capsule by mouth 2 (two) times daily.      hydrocodone-ibuprofen 7.5-200 mg (VICOPROFEN) 7.5-200 mg per tablet Take 1 tablet by mouth every 12 (twelve) hours. 60 tablet 0    mometasone (NASONEX) 50 mcg/actuation nasal spray USE 2 SPRAYS NASALLY ONE TIME DAILY 17 g 3    montelukast (SINGULAIR) 10 mg tablet Take 1 tablet (10 mg total) by mouth every evening. 30 tablet 5    MULTIVIT-MINERALS/FERROUS FUM (MULTI VITAMIN ORAL) Take by mouth.      ranitidine (ZANTAC) 150 MG tablet Take 150 mg by mouth 2 (two) times daily.      tamsulosin (FLOMAX) 0.4 mg Cp24 Take 1 capsule by mouth Daily.  0     No current facility-administered medications for this visit.        Past Medical History:   Diagnosis Date    Anxiety     Arthritis     Depression     GERD (gastroesophageal reflux disease)     HIV infection      Hyperlipidemia     Hypogonadism male     Low back pain     Prostate disease      Past Surgical History:   Procedure Laterality Date    NOSE SURGERY       Family History   Problem Relation Age of Onset    Hypertension Mother     Arthritis Mother     Cancer Father     COPD Sister     No Known Problems Maternal Grandmother     No Known Problems Maternal Grandfather     No Known Problems Paternal Grandmother     No Known Problems Paternal Grandfather      Social History     Tobacco Use    Smoking status: Former Smoker     Types: Cigarettes    Smokeless tobacco: Never Used   Substance Use Topics    Alcohol use: No     Alcohol/week: 0.0 oz    Drug use: Yes     Types: Marijuana        Review of Systems:  Review of Systems   HENT: Negative.    Respiratory: Negative.    Cardiovascular: Negative.    Gastrointestinal: Negative.    Genitourinary: Negative.    Musculoskeletal: Negative.    Skin: Negative.        OBJECTIVE:     Vital Signs (Most Recent)  Pulse: 64 (08/20/18 0946)  BP: 120/67 (08/20/18 0946)  6' (1.829 m)  74.8 kg (165 lb)     Physical Exam:  Physical Exam   Constitutional: He is oriented to person, place, and time. He appears well-developed and well-nourished.   HENT:   Head: Normocephalic and atraumatic.   Eyes: EOM are normal.   Neck: Normal range of motion. Neck supple.   Cardiovascular: Normal rate, regular rhythm and normal heart sounds.   Pulmonary/Chest: Effort normal and breath sounds normal.   Abdominal: Soft. Normal appearance and bowel sounds are normal. A hernia is present. Hernia confirmed positive in the right inguinal area.   Reducible right inguinal hernia    Musculoskeletal: Normal range of motion.   Neurological: He is alert and oriented to person, place, and time.   Skin: Skin is warm and dry.       Laboratory      Diagnostic Results:      ASSESSMENT/PLAN:     Right inguinal hernia     PLAN:Plan     Repair

## 2018-08-21 ENCOUNTER — PATIENT MESSAGE (OUTPATIENT)
Dept: SURGERY | Facility: OTHER | Age: 58
End: 2018-08-21

## 2018-08-28 ENCOUNTER — TELEPHONE (OUTPATIENT)
Dept: INTERNAL MEDICINE | Facility: CLINIC | Age: 58
End: 2018-08-28

## 2018-08-28 ENCOUNTER — HOSPITAL ENCOUNTER (OUTPATIENT)
Dept: PREADMISSION TESTING | Facility: OTHER | Age: 58
Discharge: HOME | End: 2018-08-28
Attending: SPECIALIST
Payer: COMMERCIAL

## 2018-08-28 ENCOUNTER — ANESTHESIA EVENT (OUTPATIENT)
Dept: SURGERY | Facility: OTHER | Age: 58
End: 2018-08-28
Payer: COMMERCIAL

## 2018-08-28 VITALS
TEMPERATURE: 98 F | SYSTOLIC BLOOD PRESSURE: 126 MMHG | HEIGHT: 72 IN | HEART RATE: 68 BPM | DIASTOLIC BLOOD PRESSURE: 79 MMHG | WEIGHT: 164 LBS | OXYGEN SATURATION: 99 % | BODY MASS INDEX: 22.21 KG/M2

## 2018-08-28 DIAGNOSIS — E29.1 HYPOGONADISM IN MALE: Chronic | ICD-10-CM

## 2018-08-28 DIAGNOSIS — B20 HIV DISEASE: Primary | Chronic | ICD-10-CM

## 2018-08-28 DIAGNOSIS — A53.9: ICD-10-CM

## 2018-08-28 RX ORDER — SODIUM CHLORIDE, SODIUM LACTATE, POTASSIUM CHLORIDE, CALCIUM CHLORIDE 600; 310; 30; 20 MG/100ML; MG/100ML; MG/100ML; MG/100ML
INJECTION, SOLUTION INTRAVENOUS CONTINUOUS
Status: CANCELLED | OUTPATIENT
Start: 2018-08-28

## 2018-08-28 NOTE — DISCHARGE INSTRUCTIONS
PRE-ADMIT TESTING -  [phone]    [address]  [place] [facility]          Your surgery has been scheduled at [facility]. We are pleased to have the opportunity to serve you. For Further Information please call [phone].    On the day of surgery please report to the Information Desk on the 1st floor.    · CONTACT YOUR PHYSICIAN'S OFFICE THE DAY PRIOR TO YOUR SURGERY TO OBTAIN YOUR ARRIVAL TIME.     · The evening before surgery do not eat anything after 9 p.m. ( this includes hard candy, chewing gum and mints).  You may only have GATORADE, POWERADE AND WATER  from 9 p.m. until you leave your home.   DO NOT DRINK ANY LIQUIDS ON THE WAY TO THE HOSPITAL.      SPECIAL MEDICATION INSTRUCTIONS: TAKE medications checked off by the Anesthesiologist on your Medication List.    Angiogram Patients: Take medications as instructed by your physician, including aspirin.     Surgery Patients:    If you take ASPIRIN - Your PHYSICIAN/SURGEON will need to inform you IF/OR when you need to stop taking aspirin prior to your surgery.     Do Not take any medications containing IBUPROFEN.  Do Not Wear any make-up or dark nail polish   (especially eye make-up) to surgery. If you come to surgery with makeup on you will be required to remove the makeup or nail polish.    Do not shave your surgical area at least 5 days prior to your surgery. The surgical prep will be performed at the hospital according to Infection Control regulations.    Leave all valuables at home.   Do Not wear any jewelry or watches, including any metal in body piercings.  Contact Lens must be removed before surgery. Either do not wear the contact lens or bring a case and solution for storage.  Please bring a container for eyeglasses or dentures as required.  Bring any paperwork your physician has provided, such as consent forms,  history and physicals, doctor's orders, etc.   Bring comfortable clothes that are loose fitting to wear upon  discharge. Take into consideration the type of surgery being performed.  Maintain your diet as advised per your physician the day prior to surgery.      Adequate rest the night before surgery is advised.   Park in the Parking lot behind the hospital or in the [facility] [place] [facility] across the street from the parking lot. Parking is complimentary.  If you will be discharged the same day as your procedure, please arrange for a responsible adult to drive you home or to accompany you if traveling by taxi.   YOU WILL NOT BE PERMITTED TO DRIVE OR TO LEAVE THE HOSPITAL ALONE AFTER SURGERY.   It is strongly recommended that you arrange for someone to remain with you for the first 24 hrs following your surgery.       Thank you for your cooperation.  The Staff of [facility].        Bathing Instructions                                                                 Please shower the evening before and morning of your procedure with    ANTIBACTERIAL SOAP. ( DIAL, etc )  Concentrate on the surgical area   for at least 3 minutes and rinse completely. Dry off as usual.   Do not use any deodorant, powder, body lotions, perfume, after shave or    cologne.

## 2018-08-28 NOTE — TELEPHONE ENCOUNTER
Patient called [facility] was missing some labs, orders were in just to wrong resulting agency. Put new orders in to [facility] and spoke to [facility]  to verify add ons

## 2018-08-31 ENCOUNTER — HOSPITAL ENCOUNTER (OUTPATIENT)
Facility: OTHER | Age: 58
Discharge: HOME | End: 2018-08-31
Attending: SPECIALIST | Admitting: SPECIALIST
Payer: COMMERCIAL

## 2018-08-31 ENCOUNTER — ANESTHESIA (OUTPATIENT)
Dept: SURGERY | Facility: OTHER | Age: 58
End: 2018-08-31
Payer: COMMERCIAL

## 2018-08-31 VITALS
HEIGHT: 72 IN | TEMPERATURE: 98 F | BODY MASS INDEX: 22.21 KG/M2 | DIASTOLIC BLOOD PRESSURE: 68 MMHG | HEART RATE: 65 BPM | WEIGHT: 164 LBS | OXYGEN SATURATION: 99 % | RESPIRATION RATE: 16 BRPM | SYSTOLIC BLOOD PRESSURE: 118 MMHG

## 2018-08-31 DIAGNOSIS — K40.90 RIGHT INGUINAL HERNIA: Primary | ICD-10-CM

## 2018-08-31 LAB
ALBUMIN SERPL-MCNC: 4.1 G/DL (ref 3.6–5.1)
ALBUMIN/GLOB SERPL: 1.7 (CALC) (ref 1–2.5)
ALP SERPL-CCNC: 110 U/L (ref 40–115)
ALT SERPL-CCNC: 56 U/L (ref 9–46)
APPEARANCE UR: CLEAR
AST SERPL-CCNC: 47 U/L (ref 10–35)
BASOPHILS # BLD AUTO: 39 CELLS/UL (ref 0–200)
BASOPHILS NFR BLD AUTO: 0.8 %
BILIRUB SERPL-MCNC: 0.4 MG/DL (ref 0.2–1.2)
BILIRUB UR QL STRIP: NEGATIVE
BUN SERPL-MCNC: 20 MG/DL (ref 7–25)
BUN/CREAT SERPL: ABNORMAL (CALC) (ref 6–22)
CALCIUM SERPL-MCNC: 9.2 MG/DL (ref 8.6–10.3)
CD3+CD4+ CELLS # BLD: 497 CELLS/UL (ref 490–1740)
CD3+CD4+ CELLS NFR BLD: 51 % (ref 30–61)
CHLORIDE SERPL-SCNC: 105 MMOL/L (ref 98–110)
CO2 SERPL-SCNC: 29 MMOL/L (ref 20–32)
COLOR UR: YELLOW
CREAT SERPL-MCNC: 1.22 MG/DL (ref 0.7–1.33)
EOSINOPHIL # BLD AUTO: 78 CELLS/UL (ref 15–500)
EOSINOPHIL NFR BLD AUTO: 1.6 %
ERYTHROCYTE [DISTWIDTH] IN BLOOD BY AUTOMATED COUNT: 12.9 % (ref 11–15)
GFR SERPL CREATININE-BSD FRML MDRD: 64 ML/MIN/1.73M2
GLOBULIN SER CALC-MCNC: 2.4 G/DL (CALC) (ref 1.9–3.7)
GLUCOSE SERPL-MCNC: 90 MG/DL (ref 65–99)
GLUCOSE UR QL STRIP: NEGATIVE
HCT VFR BLD AUTO: 44.8 % (ref 38.5–50)
HGB BLD-MCNC: 14.7 G/DL (ref 13.2–17.1)
HGB UR QL STRIP: NEGATIVE
HIV1 RNA # SERPL NAA+PROBE: NORMAL COPIES/ML
HIV1 RNA SERPL NAA+PROBE-LOG#: NORMAL LOG COPIES/ML
KETONES UR QL STRIP: NEGATIVE
LEUKOCYTE ESTERASE UR QL STRIP: NEGATIVE
LYMPHOCYTES # BLD AUTO: 972 CELLS/UL (ref 850–3900)
LYMPHOCYTES # BLD AUTO: 990 CELLS/UL (ref 850–3900)
LYMPHOCYTES NFR BLD AUTO: 20.2 %
MCH RBC QN AUTO: 30.4 PG (ref 27–33)
MCHC RBC AUTO-ENTMCNC: 32.8 G/DL (ref 32–36)
MCV RBC AUTO: 92.6 FL (ref 80–100)
MONOCYTES # BLD AUTO: 495 CELLS/UL (ref 200–950)
MONOCYTES NFR BLD AUTO: 10.1 %
NEUTROPHILS # BLD AUTO: 3298 CELLS/UL (ref 1500–7800)
NEUTROPHILS NFR BLD AUTO: 67.3 %
NITRITE UR QL STRIP: NEGATIVE
PH UR STRIP: 6 [PH] (ref 5–8)
PLATELET # BLD AUTO: 212 THOUSAND/UL (ref 140–400)
PMV BLD REES-ECKER: 10.8 FL (ref 7.5–12.5)
POTASSIUM SERPL-SCNC: 4.5 MMOL/L (ref 3.5–5.3)
PROT SERPL-MCNC: 6.5 G/DL (ref 6.1–8.1)
PROT UR QL STRIP: NEGATIVE
RBC # BLD AUTO: 4.84 MILLION/UL (ref 4.2–5.8)
RPR SER QL: REACTIVE
RPR SER-TITR: ABNORMAL {TITER}
SODIUM SERPL-SCNC: 141 MMOL/L (ref 135–146)
SP GR UR STRIP: 1.02 (ref 1–1.03)
TESTOST SERPL-MCNC: 32.9 PG/ML (ref 46–224)
TSH SERPL-ACNC: 2.5 MIU/L (ref 0.4–4.5)
WBC # BLD AUTO: 4.9 THOUSAND/UL (ref 3.8–10.8)

## 2018-08-31 PROCEDURE — 71000033 HC RECOVERY, INTIAL HOUR: Performed by: SPECIALIST

## 2018-08-31 PROCEDURE — 27201423 OPTIME MED/SURG SUP & DEVICES STERILE SUPPLY: Performed by: SPECIALIST

## 2018-08-31 PROCEDURE — 25000003 PHARM REV CODE 250: Performed by: NURSE ANESTHETIST, CERTIFIED REGISTERED

## 2018-08-31 PROCEDURE — 71000016 HC POSTOP RECOV ADDL HR: Performed by: SPECIALIST

## 2018-08-31 PROCEDURE — 36000706: Performed by: SPECIALIST

## 2018-08-31 PROCEDURE — 36000707: Performed by: SPECIALIST

## 2018-08-31 PROCEDURE — 37000009 HC ANESTHESIA EA ADD 15 MINS: Performed by: SPECIALIST

## 2018-08-31 PROCEDURE — 63600175 PHARM REV CODE 636 W HCPCS: Performed by: NURSE ANESTHETIST, CERTIFIED REGISTERED

## 2018-08-31 PROCEDURE — 63600175 PHARM REV CODE 636 W HCPCS: Performed by: SPECIALIST

## 2018-08-31 PROCEDURE — C9290 INJ, BUPIVACAINE LIPOSOME: HCPCS | Performed by: SPECIALIST

## 2018-08-31 PROCEDURE — 25000003 PHARM REV CODE 250: Performed by: SPECIALIST

## 2018-08-31 PROCEDURE — 37000008 HC ANESTHESIA 1ST 15 MINUTES: Performed by: SPECIALIST

## 2018-08-31 PROCEDURE — C1781 MESH (IMPLANTABLE): HCPCS | Performed by: SPECIALIST

## 2018-08-31 PROCEDURE — 25000003 PHARM REV CODE 250: Performed by: ANESTHESIOLOGY

## 2018-08-31 PROCEDURE — 71000015 HC POSTOP RECOV 1ST HR: Performed by: SPECIALIST

## 2018-08-31 DEVICE — MESH SOFT PROLENE WVN 3X6: Type: IMPLANTABLE DEVICE | Site: INGUINAL | Status: FUNCTIONAL

## 2018-08-31 RX ORDER — PROPOFOL 10 MG/ML
VIAL (ML) INTRAVENOUS
Status: DISCONTINUED | OUTPATIENT
Start: 2018-08-31 | End: 2018-08-31

## 2018-08-31 RX ORDER — ACETAMINOPHEN 325 MG/1
650 TABLET ORAL EVERY 8 HOURS PRN
Status: DISCONTINUED | OUTPATIENT
Start: 2018-08-31 | End: 2018-08-31 | Stop reason: HOSPADM

## 2018-08-31 RX ORDER — NEOSTIGMINE METHYLSULFATE 1 MG/ML
INJECTION, SOLUTION INTRAVENOUS
Status: DISCONTINUED | OUTPATIENT
Start: 2018-08-31 | End: 2018-08-31

## 2018-08-31 RX ORDER — OXYCODONE HYDROCHLORIDE 5 MG/1
5 TABLET ORAL EVERY 4 HOURS PRN
Status: DISCONTINUED | OUTPATIENT
Start: 2018-08-31 | End: 2018-08-31 | Stop reason: HOSPADM

## 2018-08-31 RX ORDER — DIPHENHYDRAMINE HYDROCHLORIDE 50 MG/ML
25 INJECTION INTRAMUSCULAR; INTRAVENOUS EVERY 4 HOURS PRN
Status: DISCONTINUED | OUTPATIENT
Start: 2018-08-31 | End: 2018-08-31 | Stop reason: HOSPADM

## 2018-08-31 RX ORDER — FENTANYL CITRATE 50 UG/ML
INJECTION, SOLUTION INTRAMUSCULAR; INTRAVENOUS
Status: DISCONTINUED | OUTPATIENT
Start: 2018-08-31 | End: 2018-08-31

## 2018-08-31 RX ORDER — FENTANYL CITRATE 50 UG/ML
25 INJECTION, SOLUTION INTRAMUSCULAR; INTRAVENOUS EVERY 5 MIN PRN
Status: DISCONTINUED | OUTPATIENT
Start: 2018-08-31 | End: 2018-08-31 | Stop reason: HOSPADM

## 2018-08-31 RX ORDER — SODIUM CHLORIDE 9 MG/ML
INJECTION, SOLUTION INTRAVENOUS CONTINUOUS
Status: DISCONTINUED | OUTPATIENT
Start: 2018-08-31 | End: 2018-08-31 | Stop reason: HOSPADM

## 2018-08-31 RX ORDER — SODIUM CHLORIDE 0.9 % (FLUSH) 0.9 %
3 SYRINGE (ML) INJECTION
Status: DISCONTINUED | OUTPATIENT
Start: 2018-08-31 | End: 2018-08-31 | Stop reason: HOSPADM

## 2018-08-31 RX ORDER — OXYCODONE AND ACETAMINOPHEN 7.5; 325 MG/1; MG/1
1 TABLET ORAL EVERY 4 HOURS PRN
Qty: 40 TABLET | Refills: 0 | Status: SHIPPED | OUTPATIENT
Start: 2018-08-31 | End: 2018-09-19

## 2018-08-31 RX ORDER — OXYCODONE HYDROCHLORIDE 5 MG/1
10 TABLET ORAL EVERY 4 HOURS PRN
Status: DISCONTINUED | OUTPATIENT
Start: 2018-08-31 | End: 2018-08-31 | Stop reason: HOSPADM

## 2018-08-31 RX ORDER — ATROPINE SULFATE 0.4 MG/ML
INJECTION, SOLUTION ENDOTRACHEAL; INTRAMEDULLARY; INTRAMUSCULAR; INTRAVENOUS; SUBCUTANEOUS
Status: DISCONTINUED | OUTPATIENT
Start: 2018-08-31 | End: 2018-08-31

## 2018-08-31 RX ORDER — PHENYLEPHRINE HYDROCHLORIDE 10 MG/ML
INJECTION INTRAVENOUS
Status: DISCONTINUED | OUTPATIENT
Start: 2018-08-31 | End: 2018-08-31

## 2018-08-31 RX ORDER — ACETAMINOPHEN 10 MG/ML
INJECTION, SOLUTION INTRAVENOUS
Status: DISCONTINUED | OUTPATIENT
Start: 2018-08-31 | End: 2018-08-31

## 2018-08-31 RX ORDER — ONDANSETRON 2 MG/ML
4 INJECTION INTRAMUSCULAR; INTRAVENOUS DAILY PRN
Status: DISCONTINUED | OUTPATIENT
Start: 2018-08-31 | End: 2018-08-31 | Stop reason: HOSPADM

## 2018-08-31 RX ORDER — LIDOCAINE HYDROCHLORIDE 10 MG/ML
1 INJECTION, SOLUTION EPIDURAL; INFILTRATION; INTRACAUDAL; PERINEURAL ONCE
Status: DISCONTINUED | OUTPATIENT
Start: 2018-08-31 | End: 2018-08-31 | Stop reason: HOSPADM

## 2018-08-31 RX ORDER — ONDANSETRON 8 MG/1
8 TABLET, ORALLY DISINTEGRATING ORAL EVERY 8 HOURS PRN
Status: DISCONTINUED | OUTPATIENT
Start: 2018-08-31 | End: 2018-08-31 | Stop reason: HOSPADM

## 2018-08-31 RX ORDER — MEPERIDINE HYDROCHLORIDE 50 MG/ML
12.5 INJECTION INTRAMUSCULAR; INTRAVENOUS; SUBCUTANEOUS ONCE AS NEEDED
Status: DISCONTINUED | OUTPATIENT
Start: 2018-08-31 | End: 2018-08-31 | Stop reason: HOSPADM

## 2018-08-31 RX ORDER — BUPIVACAINE HYDROCHLORIDE 2.5 MG/ML
INJECTION, SOLUTION EPIDURAL; INFILTRATION; INTRACAUDAL
Status: DISCONTINUED | OUTPATIENT
Start: 2018-08-31 | End: 2018-08-31 | Stop reason: HOSPADM

## 2018-08-31 RX ORDER — ACETAMINOPHEN 325 MG/1
650 TABLET ORAL EVERY 4 HOURS PRN
Status: DISCONTINUED | OUTPATIENT
Start: 2018-08-31 | End: 2018-08-31 | Stop reason: HOSPADM

## 2018-08-31 RX ORDER — RAMELTEON 8 MG/1
8 TABLET ORAL NIGHTLY PRN
Status: DISCONTINUED | OUTPATIENT
Start: 2018-08-31 | End: 2018-08-31 | Stop reason: HOSPADM

## 2018-08-31 RX ORDER — LIDOCAINE HCL/PF 100 MG/5ML
SYRINGE (ML) INTRAVENOUS
Status: DISCONTINUED | OUTPATIENT
Start: 2018-08-31 | End: 2018-08-31

## 2018-08-31 RX ORDER — HYDROMORPHONE HYDROCHLORIDE 2 MG/ML
0.4 INJECTION, SOLUTION INTRAMUSCULAR; INTRAVENOUS; SUBCUTANEOUS EVERY 5 MIN PRN
Status: DISCONTINUED | OUTPATIENT
Start: 2018-08-31 | End: 2018-08-31 | Stop reason: HOSPADM

## 2018-08-31 RX ORDER — CEFAZOLIN SODIUM 2 G/50ML
2 SOLUTION INTRAVENOUS
Status: DISCONTINUED | OUTPATIENT
Start: 2018-08-31 | End: 2018-08-31 | Stop reason: HOSPADM

## 2018-08-31 RX ORDER — OXYCODONE HYDROCHLORIDE 5 MG/1
5 TABLET ORAL
Status: DISCONTINUED | OUTPATIENT
Start: 2018-08-31 | End: 2018-08-31 | Stop reason: HOSPADM

## 2018-08-31 RX ORDER — EPHEDRINE SULFATE 50 MG/ML
INJECTION, SOLUTION INTRAVENOUS
Status: DISCONTINUED | OUTPATIENT
Start: 2018-08-31 | End: 2018-08-31

## 2018-08-31 RX ORDER — ONDANSETRON 2 MG/ML
INJECTION INTRAMUSCULAR; INTRAVENOUS
Status: DISCONTINUED | OUTPATIENT
Start: 2018-08-31 | End: 2018-08-31

## 2018-08-31 RX ORDER — SODIUM CHLORIDE, SODIUM LACTATE, POTASSIUM CHLORIDE, CALCIUM CHLORIDE 600; 310; 30; 20 MG/100ML; MG/100ML; MG/100ML; MG/100ML
INJECTION, SOLUTION INTRAVENOUS CONTINUOUS
Status: DISCONTINUED | OUTPATIENT
Start: 2018-08-31 | End: 2018-08-31 | Stop reason: HOSPADM

## 2018-08-31 RX ORDER — ROCURONIUM BROMIDE 10 MG/ML
INJECTION, SOLUTION INTRAVENOUS
Status: DISCONTINUED | OUTPATIENT
Start: 2018-08-31 | End: 2018-08-31

## 2018-08-31 RX ORDER — SODIUM CHLORIDE 0.9 % (FLUSH) 0.9 %
3 SYRINGE (ML) INJECTION EVERY 8 HOURS
Status: DISCONTINUED | OUTPATIENT
Start: 2018-08-31 | End: 2018-08-31 | Stop reason: HOSPADM

## 2018-08-31 RX ORDER — GLYCOPYRROLATE 0.2 MG/ML
INJECTION INTRAMUSCULAR; INTRAVENOUS
Status: DISCONTINUED | OUTPATIENT
Start: 2018-08-31 | End: 2018-08-31

## 2018-08-31 RX ADMIN — GLYCOPYRROLATE 0.8 MG: 0.2 INJECTION, SOLUTION INTRAMUSCULAR; INTRAVENOUS at 09:08

## 2018-08-31 RX ADMIN — ATROPINE SULFATE 0.5 MG: 0.4 INJECTION, SOLUTION INTRAMUSCULAR; INTRAVENOUS; SUBCUTANEOUS at 09:08

## 2018-08-31 RX ADMIN — EPHEDRINE SULFATE 10 MG: 50 INJECTION INTRAMUSCULAR; INTRAVENOUS; SUBCUTANEOUS at 09:08

## 2018-08-31 RX ADMIN — LIDOCAINE HYDROCHLORIDE 100 MG: 20 INJECTION, SOLUTION INTRAVENOUS at 08:08

## 2018-08-31 RX ADMIN — PHENYLEPHRINE HYDROCHLORIDE 100 MCG: 10 INJECTION INTRAVENOUS at 09:08

## 2018-08-31 RX ADMIN — CEFAZOLIN SODIUM 2 G: 2 SOLUTION INTRAVENOUS at 08:08

## 2018-08-31 RX ADMIN — FENTANYL CITRATE 100 MCG: 50 INJECTION, SOLUTION INTRAMUSCULAR; INTRAVENOUS at 08:08

## 2018-08-31 RX ADMIN — ROCURONIUM BROMIDE 40 MG: 10 INJECTION, SOLUTION INTRAVENOUS at 08:08

## 2018-08-31 RX ADMIN — ONDANSETRON 4 MG: 2 INJECTION INTRAMUSCULAR; INTRAVENOUS at 09:08

## 2018-08-31 RX ADMIN — NEOSTIGMINE METHYLSULFATE 5 MG: 1 INJECTION INTRAVENOUS at 09:08

## 2018-08-31 RX ADMIN — OXYCODONE HYDROCHLORIDE 5 MG: 5 TABLET ORAL at 10:08

## 2018-08-31 RX ADMIN — SODIUM CHLORIDE, SODIUM LACTATE, POTASSIUM CHLORIDE, AND CALCIUM CHLORIDE: 600; 310; 30; 20 INJECTION, SOLUTION INTRAVENOUS at 08:08

## 2018-08-31 RX ADMIN — PROPOFOL 180 MG: 10 INJECTION, EMULSION INTRAVENOUS at 08:08

## 2018-08-31 RX ADMIN — ACETAMINOPHEN 1000 MG: 10 INJECTION, SOLUTION INTRAVENOUS at 09:08

## 2018-08-31 RX ADMIN — SODIUM CHLORIDE, SODIUM LACTATE, POTASSIUM CHLORIDE, AND CALCIUM CHLORIDE: 600; 310; 30; 20 INJECTION, SOLUTION INTRAVENOUS at 09:08

## 2018-08-31 NOTE — ANESTHESIA POSTPROCEDURE EVALUATION
Anesthesia Post Evaluation    Patient: Reji Gates    Procedure(s) Performed: Procedure(s) (LRB):  REPAIR, HERNIA WITH MESH , INGUINAL, WITHOUT HISTORY OF PRIOR REPAIR, AGE 3 YEARS OR OLDER (Right)    Final Anesthesia Type: general  Patient location during evaluation: PACU  Patient participation: Yes- Able to Participate  Level of consciousness: awake and alert  Post-procedure vital signs: reviewed and stable  Pain management: adequate  Airway patency: patent  PONV status at discharge: No PONV  Anesthetic complications: no      Cardiovascular status: blood pressure returned to baseline  Respiratory status: unassisted and spontaneous ventilation  Hydration status: euvolemic  Follow-up not needed.        Visit Vitals  BP (!) 116/53 (BP Location: Right arm, Patient Position: Lying)   Pulse 65   Temp 36.5 °C (97.7 °F) (Oral)   Resp 16   Ht 6' (1.829 m)   Wt 74.4 kg (164 lb)   SpO2 98%   BMI 22.24 kg/m²       Pain/Yohan Score: Pain Assessment Performed: Yes (8/31/2018 10:12 AM)  Presence of Pain: denies (8/31/2018 10:12 AM)  Yohan Score: 9 (8/31/2018 10:12 AM)

## 2018-08-31 NOTE — TRANSFER OF CARE
Anesthesia Transfer of Care Note    Patient: Reji Gates    Procedure(s) Performed: Procedure(s) (LRB):  REPAIR, HERNIA WITH MESH , INGUINAL, WITHOUT HISTORY OF PRIOR REPAIR, AGE 3 YEARS OR OLDER (Right)    Patient location: PACU    Anesthesia Type: general    Transport from OR: Transported from OR on 2-3 L/min O2 by NC with adequate spontaneous ventilation    Post pain: adequate analgesia    Post assessment: no apparent anesthetic complications    Post vital signs: stable    Level of consciousness: awake, alert and oriented    Nausea/Vomiting: no nausea/vomiting    Complications: none    Transfer of care protocol was followed      Last vitals:   Visit Vitals  /67 (BP Location: Left arm, Patient Position: Lying)   Pulse 65   Temp 37.1 °C (98.8 °F) (Oral)   Resp 16   Ht 6' (1.829 m)   Wt 74.4 kg (164 lb)   SpO2 97%   BMI 22.24 kg/m²

## 2018-08-31 NOTE — DISCHARGE INSTRUCTIONS
Your Surgeon Gave You EXPAREL® (bupivacaine liposome injectable suspension)    To help control your pain after surgery, your surgeon injected EXPAREL into your surgical incision just before the end of the procedure.   EXPAREL is a local analgesic that contains the local anesthetic bupivacaine. Local anesthetics provide pain relief by numbing the tissue  around the surgical site.   EXPAREL is specifically designed to release pain medication over time and can control pain for up to 72 hours.   In addition to EXPAREL, your surgeon may provide other pain medications to control your pain.   Each patient is different and responds differently to painmedication. Depending on how you respond to EXPAREL, you may require less  additional pain medication during your recovery.    Possible Side Effects    Side effects can occur with any medication and it is important not to ignore anything you may be experiencing. Some patients who  received EXPAREL experienced nausea, vomiting, or constipation. Rarely, patients who receive bupivacaine (the active ingredient in  EXPAREL) have experienced numbness and tingling in their mouth or lips, lightheadedness, or anxiety. Speak with your doctor right  away if you think you may be experiencing any of these sensations, or if you have other questions regarding possible side effects.    Your Recovery    When your pain is under control, your body can better focus on healing. This is not the time to test your pain tolerance, or grin and  bear it.Work with your surgeon and nurse to make your recovery as speedy and pain-free as possible.   Follow the post-op orders your nurse gave you.   Eat a healthy diet and drink plenty of water. Surgery stresses your body; your body responds by needing more energy to heal.      Important Information About EXPAREL  Products that contain bupivacaine, like EXPAREL, may cause a temporary loss of  sensation or the ability to move in the area where  bupivacaine was injected.    Date Administered: 8/31/18  Time Administered: 9:25 AM    Other Forms of Bupivicaine should not be administered within 96hrs following administration of EXPAREL.           Anesthesia: After Your Surgery  Youve just had surgery. During surgery, you received medication called anesthesia to keep you comfortable and pain-free. After surgery, you may experience some pain or nausea. This is common. Here are some tips for feeling better and recovering after surgery.    Going home  Your doctor or nurse will show you how to take care of yourself when you go home. He or she will also answer your questions. Have an adult family member or friend drive you home. For the first 24 hours after your surgery:  · Do not drive or use heavy equipment.  · Do not make important decisions or sign legal documents.  · Avoid alcohol.  · Have someone stay with you, if needed. He or she can watch for problems and help keep you safe.  Be sure to keep all follow-up appointments with your doctor. And rest after your procedure for as long as your doctor tells you to.    Coping with pain  If you have pain after surgery, pain medication will help you feel better. Take it as directed, before pain becomes severe. Also, ask your doctor or pharmacist about other ways to control pain, such as with heat, ice, and relaxation. And follow any other instructions your surgeon or nurse gives you.    Tips for taking pain medication  To get the best relief possible, remember these points:  · Pain medications can upset your stomach. Taking them with a little food may help.  · Most pain relievers taken by mouth need at least 20 to 30 minutes to take effect.  · Taking medication on a schedule can help you remember to take it. Try to time your medication so that you can take it before beginning an activity, such as dressing, walking, or sitting down for dinner.  · Constipation is a common side effect of pain medications. Contact your  doctor before taking any medications like laxatives or stool softeners to help relieve constipation. Also ask about any dietary restrictions, because drinking lots of fluids and eating foods like fruits and vegetables that are high in fiber can also help. Remember, dont take laxatives unless your surgeon has prescribed them.  · Mixing alcohol and pain medication can cause dizziness and slow your breathing. It can even be fatal. Dont drink alcohol while taking pain medication.  · Pain medication can slow your reflexes. Dont drive or operate machinery while taking pain medication.  If your health care provider tells you to take acetaminophen to help relieve your pain, ask him or her how much you are supposed to take each day. (Acetaminophen is the generic name for Tylenol and other brand-name pain relievers.) Acetaminophen or other pain relievers may interact with your prescription medicines or other over-the-counter (OTC) drugs. Some prescription medications contain acetaminophen along with other active ingredients. Using both prescription and OTC acetaminophen for pain can cause you to overdose. The FDA recommends that you read the labels on your OTC medications carefully. This will help you to clearly understand the list of active ingredients, dosing instructions, and any warnings. It may also help you avoid taking too much acetaminophen. If you have questions or don't understand the information, ask your pharmacist or health care provider to explain it to you before you take the OTC medication.    Managing nausea  Some people have an upset stomach after surgery. This is often due to anesthesia, pain, pain medications, or the stress of surgery. The following tips will help you manage nausea and get good nutrition as you recover. If you were on a special diet before surgery, ask your doctor if you should follow it during recovery. These tips may help:  · Dont push yourself to eat. Your body will tell you when to  eat and how much.  · Start off with clear liquids and soup. They are easier to digest.  · Progress to semi-solid foods (mashed potatoes, applesauce, and gelatin) as you feel ready.  · Slowly move to solid foods. Dont eat fatty, rich, or spicy foods at first.  · Dont force yourself to have three large meals a day. Instead, eat smaller amounts more often.  · Take pain medications with a small amount of solid food, such as crackers or toast to avoid nausea.      Call your surgeon if  · You still have pain an hour after taking medication (it may not be strong enough).  · You feel too sleepy, dizzy, or groggy (medication may be too strong).  · You have side effects like nausea, vomiting, or skin changes (rash, itching, or hives).   © 6596-7358 [employer]. [address] [place] [address], [place] [address]. All rights reserved. This information is not intended as a substitute for professional medical care. Always follow your healthcare professional's instructions.

## 2018-08-31 NOTE — OR NURSING
Reji Gates has met all discharge criteria from Phase II. Vital Signs are stable, ambulating  without difficulty. Discharge instructions given, patient verbalized understanding. Discharged from facility via wheelchair in stable condition.

## 2018-08-31 NOTE — OP NOTE
DATE OF PROCEDURE:  08/31/2018.    PREOPERATIVE DIAGNOSIS:  Symptomatic right inguinal hernia.    POSTOPERATIVE DIAGNOSIS:  Symptomatic right inguinal hernia.    PROCEDURE:  Open mesh repair of right inguinal hernia.    PROCEDURE IN DETAIL:  The patient was brought to the Operating Room, placed in   supine position.  The abdomen and groin prepped and draped in a sterile fashion.    Transverse incision made over the inguinal canal, carried down through the   subcutaneous tissues.  Fascia of the external oblique opened along the bias of   the fibers.  Spermatic cord identified and isolated.  Cord carefully examined.    No evidence of an indirect hernia.  The floor of the inguinal canal was   completely attenuated.  Palpation through the floor revealed no evidence of   femoral defect.  A piece of soft Prolene mesh was cut to an appropriate size and   configuration, sutured along the iliopubic tract laterally and the conjoint   tendon medially with 0 Ethibond suture.  Mesh was lyon-holed around the spermatic   cord.  After reconstruction of floor of the inguinal canal, the spermatic cord   was returned to its normal anatomic position.  The external oblique closed with   subQ with interrupted 3-0 Vicryl, skin with running 4-0 Monocryl.  The patient   tolerated the procedure well and left the Operating Room in good condition.      JOE  dd: 08/31/2018 10:15:31 (CDT)  td: 08/31/2018 12:35:00 (MARI)  Doc ID   #[id number]  Job ID #[id number]    CC:

## 2018-08-31 NOTE — BRIEF OP NOTE
[facility]  Brief Operative Note     SUMMARY     Surgery Date: 8/31/2018     Surgeon(s) and Role:     * Luis Miguel Artis Jr., MD - Primary    Assisting Surgeon: None    Pre-op Diagnosis:  Unilateral inguinal hernia without obstruction or gangrene, recurrence not specified [K40.90]    Post-op Diagnosis:  Post-Op Diagnosis Codes:     * Unilateral inguinal hernia without obstruction or gangrene, recurrence not specified [K40.90]    Procedure(s) (LRB):  REPAIR, HERNIA WITH MESH , INGUINAL, WITHOUT HISTORY OF PRIOR REPAIR, AGE 5 YEARS OR OLDER (Right)    Anesthesia: General    Description of the findings of the procedure: large direct [facility]    Findings/Key Components: above    Estimated Blood Loss: minimal         Specimens:   Specimen (12h ago, onward)    None          Discharge Note    SUMMARY     Admit Date: 8/31/2018    Discharge Date and Time:  08/31/2018 10:13 AM    Hospital Course (synopsis of major diagnoses, care, treatment, and services provided during the course of the hospital stay): benign     Final Diagnosis: Post-Op Diagnosis Codes:     * Unilateral inguinal hernia without obstruction or gangrene, recurrence not specified [K40.90]    Disposition: Home or Self Care    Follow Up/Patient Instructions:     Medications:  Reconciled Home Medications:      Medication List      START taking these medications    oxyCODONE-acetaminophen 7.5-325 mg per tablet  Commonly known as:  PERCOCET  Take 1 tablet by mouth every 4 (four) hours as needed for Pain.        CONTINUE taking these medications    acyclovir 800 MG Tab  Commonly known as:  ZOVIRAX  Take 1 tablet (800 mg total) by mouth once daily.     ALPRAZolam 0.5 MG tablet  Commonly known as:  XANAX  Take 1 tablet (0.5 mg total) by mouth once daily.     azelastine 0.05 % ophthalmic solution  Commonly known as:  OPTIVAR  Place 1 drop into both eyes once daily.     cetirizine 10 MG tablet  Commonly known as:  ZYRTEC  Take 10 mg by mouth once daily.      clindamycin phosphate 1% 1 % gel  Commonly known as:  CLINDAGEL  APPLY BID     eusqsjkf-jwlsbybg-gutqei-tenof (STRIBILD) [id number] mg [phone] mg per tablet  Commonly known as:  STRIBILD  Take 1 tablet by mouth once daily.     FISH-FLAX-BORAGE OIL ORAL  Take 1 capsule by mouth 2 (two) times daily.     hydrocodone-ibuprofen 7.5-200 mg 7.5-200 mg per tablet  Commonly known as:  VICOPROFEN  Take 1 tablet by mouth every 12 (twelve) hours.     mometasone 50 mcg/actuation nasal spray  Commonly known as:  NASONEX  USE 2 SPRAYS NASALLY ONE TIME DAILY     montelukast 10 mg tablet  Commonly known as:  SINGULAIR  Take 1 tablet (10 mg total) by mouth every evening.     MULTI VITAMIN ORAL  Take by mouth.     ranitidine 150 MG tablet  Commonly known as:  ZANTAC  Take 150 mg by mouth 2 (two) times daily.     tamsulosin 0.4 mg Cap  Commonly known as:  FLOMAX  Take 1 capsule by mouth Daily.     VITAMIN C 1000 MG tablet  Generic drug:  ascorbic acid (vitamin C)  Take 1,000 mg by mouth 2 (two) times daily.          Discharge Procedure Orders   Diet general     Call MD for:  persistent nausea and vomiting     Call MD for:  severe uncontrolled pain     Call MD for:  redness, tenderness, or signs of infection (pain, swelling, redness, odor or green/yellow discharge around incision site)     Ice to affected area     Lifting restrictions   Order Comments: 20 Lb's     Wound care routine (specify)   Order Comments: Wound care routine: leave steri strip intact  May shower

## 2018-09-19 ENCOUNTER — OFFICE VISIT (OUTPATIENT)
Dept: INTERNAL MEDICINE | Facility: CLINIC | Age: 58
End: 2018-09-19
Payer: COMMERCIAL

## 2018-09-19 VITALS
DIASTOLIC BLOOD PRESSURE: 70 MMHG | RESPIRATION RATE: 18 BRPM | OXYGEN SATURATION: 98 % | SYSTOLIC BLOOD PRESSURE: 120 MMHG | WEIGHT: 164.88 LBS | TEMPERATURE: 98 F | HEIGHT: 72 IN | HEART RATE: 62 BPM | BODY MASS INDEX: 22.33 KG/M2

## 2018-09-19 DIAGNOSIS — J30.2 SEASONAL ALLERGIC RHINITIS: ICD-10-CM

## 2018-09-19 DIAGNOSIS — N40.0 BENIGN PROSTATIC HYPERPLASIA, UNSPECIFIED WHETHER LOWER URINARY TRACT SYMPTOMS PRESENT: Primary | ICD-10-CM

## 2018-09-19 DIAGNOSIS — E29.1 HYPOGONADISM IN MALE: ICD-10-CM

## 2018-09-19 DIAGNOSIS — B20 HIV DISEASE: Chronic | ICD-10-CM

## 2018-09-19 DIAGNOSIS — Z23 NEED FOR INFLUENZA VACCINATION: ICD-10-CM

## 2018-09-19 DIAGNOSIS — J30.9 ALLERGIC RHINITIS, UNSPECIFIED SEASONALITY, UNSPECIFIED TRIGGER: ICD-10-CM

## 2018-09-19 DIAGNOSIS — A53.9: ICD-10-CM

## 2018-09-19 PROCEDURE — 90686 IIV4 VACC NO PRSV 0.5 ML IM: CPT | Mod: S$GLB,,, | Performed by: INTERNAL MEDICINE

## 2018-09-19 PROCEDURE — 3008F BODY MASS INDEX DOCD: CPT | Mod: CPTII,S$GLB,, | Performed by: INTERNAL MEDICINE

## 2018-09-19 PROCEDURE — 99999 PR PBB SHADOW E&M-EST. PATIENT-LVL IV: CPT | Mod: PBBFAC,,, | Performed by: INTERNAL MEDICINE

## 2018-09-19 PROCEDURE — 99214 OFFICE O/P EST MOD 30 MIN: CPT | Mod: 25,S$GLB,, | Performed by: INTERNAL MEDICINE

## 2018-09-19 PROCEDURE — 90471 IMMUNIZATION ADMIN: CPT | Mod: S$GLB,,, | Performed by: INTERNAL MEDICINE

## 2018-09-19 RX ORDER — TAMSULOSIN HYDROCHLORIDE 0.4 MG/1
1 CAPSULE ORAL DAILY
Qty: 90 CAPSULE | Refills: 3 | Status: SHIPPED | OUTPATIENT
Start: 2018-09-19 | End: 2019-11-04 | Stop reason: SDUPTHER

## 2018-09-19 RX ORDER — TESTOSTERONE CYPIONATE 200 MG/ML
200 INJECTION, SOLUTION INTRAMUSCULAR
Qty: 10 ML | Refills: 3 | Status: SHIPPED | OUTPATIENT
Start: 2018-09-19 | End: 2019-05-08 | Stop reason: SDUPTHER

## 2018-09-19 RX ORDER — MONTELUKAST SODIUM 10 MG/1
10 TABLET ORAL NIGHTLY
Qty: 90 TABLET | Refills: 3 | Status: SHIPPED | OUTPATIENT
Start: 2018-09-19 | End: 2019-09-06 | Stop reason: SDUPTHER

## 2018-09-19 RX ORDER — MOMETASONE FUROATE 50 UG/1
SPRAY, METERED NASAL
Qty: 3 EACH | Refills: 3 | Status: SHIPPED | OUTPATIENT
Start: 2018-09-19 | End: 2019-11-04 | Stop reason: SDUPTHER

## 2018-09-19 NOTE — PROGRESS NOTES
Subjective:      Patient ID: Reji Gates is a 57 y.o. male.    Chief Complaint: Follow-up (3 month follow up); Results (lab results); and Medication Refill (90 day supply)    HPI: 57 y.o. White male , CD4  497  HIV viral load non detected  RPR + 1:16 ( coming down very slowly )  Rest is good.  Thinks he may retire soon.  Developing his plan.  Discussed his sexual partner's status.  Testosterone level very low.  Just had his inguinal hernia repaired. Recuperating.    Asymptomatic  Except for extreme fatigue and no motivation.       Review of Systems   Constitutional: Negative for activity change and unexpected weight change.   HENT: Negative for hearing loss, rhinorrhea and trouble swallowing.    Eyes: Negative for discharge and visual disturbance.   Respiratory: Negative for chest tightness and wheezing.    Cardiovascular: Negative for chest pain and palpitations.   Gastrointestinal: Negative for blood in stool, constipation, diarrhea and vomiting.   Endocrine: Negative for polydipsia and polyuria.   Genitourinary: Positive for difficulty urinating and urgency. Negative for hematuria.   Musculoskeletal: Positive for arthralgias and joint swelling. Negative for neck pain.   Neurological: Negative for weakness and headaches.   Psychiatric/Behavioral: Positive for dysphoric mood. Negative for confusion.       Objective:   /70 (BP Location: Left arm, Patient Position: Sitting, BP Method: Medium (Manual))   Pulse 62   Temp 98.4 °F (36.9 °C) (Oral)   Resp 18   Ht 6' (1.829 m)   Wt 74.8 kg (164 lb 14.4 oz)   SpO2 98%   BMI 22.36 kg/m²     Physical Exam   Constitutional: He is oriented to person, place, and time. He appears well-developed and well-nourished.   HENT:   Head: Normocephalic and atraumatic.   Nose: Nose normal.   Mouth/Throat: Oropharynx is clear and moist.   Eyes: EOM are normal. Pupils are equal, round, and reactive to light.   Neck: Normal range of motion.   Cardiovascular: Normal rate,  "regular rhythm and normal heart sounds.   Pulmonary/Chest: Effort normal and breath sounds normal.   Musculoskeletal: Normal range of motion.   Neurological: He is alert and oriented to person, place, and time.   Skin: Skin is warm and dry.   Psychiatric: He has a normal mood and affect. His behavior is normal.   Nursing note and vitals reviewed.      Assessment:     1. Benign prostatic hyperplasia, unspecified whether lower urinary tract symptoms present    2. Allergic rhinitis, unspecified seasonality, unspecified trigger    3. Seasonal allergic rhinitis    4. HIV disease    5. Need for influenza vaccination    6. Lues    7. Hypogonadism in male    Stable.  Plan:     Benign prostatic hyperplasia, unspecified whether lower urinary tract symptoms present  -     tamsulosin (FLOMAX) 0.4 mg Cap; Take 1 capsule (0.4 mg total) by mouth Daily.  Dispense: 90 capsule; Refill: 3    Allergic rhinitis, unspecified seasonality, unspecified trigger  -     montelukast (SINGULAIR) 10 mg tablet; Take 1 tablet (10 mg total) by mouth every evening.  Dispense: 90 tablet; Refill: 3    Seasonal allergic rhinitis  -     mometasone (NASONEX) 50 mcg/actuation nasal spray; USE 2 SPRAYS NASALLY ONE TIME DAILY  Dispense: 3 each; Refill: 3    HIV disease  -     efqzopak-opmlemxz-pvnrew-tenof, STRIBILD, [phone] mg (STRIBILD) [phone] mg per tablet; Take 1 tablet by mouth once daily.  Dispense: 90 tablet; Refill: 3    Need for influenza vaccination  -     Influenza - Quadrivalent (3 years & older) (PF)    Lues    Hypogonadism in male  -     testosterone cypionate (DEPOTESTOTERONE CYPIONATE) 200 mg/mL injection; Inject 1 mL (200 mg total) into the muscle every 14 (fourteen) days.  Dispense: 10 mL; Refill: 3  -     syringe with needle, safety 3 mL 20 gauge x 1 1/2" Syrg; 1 Device by Misc.(Non-Drug; Combo Route) route every 14 (fourteen) days.  Dispense: 20 Syringe; Refill: 3      "

## 2018-10-22 ENCOUNTER — OFFICE VISIT (OUTPATIENT)
Dept: INTERNAL MEDICINE | Facility: CLINIC | Age: 58
End: 2018-10-22
Payer: COMMERCIAL

## 2018-10-22 VITALS
WEIGHT: 167.13 LBS | HEART RATE: 61 BPM | RESPIRATION RATE: 18 BRPM | DIASTOLIC BLOOD PRESSURE: 80 MMHG | TEMPERATURE: 98 F | SYSTOLIC BLOOD PRESSURE: 138 MMHG | OXYGEN SATURATION: 98 % | BODY MASS INDEX: 22.64 KG/M2 | HEIGHT: 72 IN
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
## 2018-10-22 PROCEDURE — 99213 OFFICE O/P EST LOW 20 MIN: CPT | Mod: S$GLB,,, | Performed by: INTERNAL MEDICINE

## 2018-10-22 PROCEDURE — 99999 PR PBB SHADOW E&M-EST. PATIENT-LVL III: CPT | Mod: PBBFAC,,, | Performed by: INTERNAL MEDICINE

## 2018-10-22 PROCEDURE — 3008F BODY MASS INDEX DOCD: CPT | Mod: CPTII,S$GLB,, | Performed by: INTERNAL MEDICINE

## 2018-10-22 NOTE — PROGRESS NOTES
Subjective:      Patient ID: Reji Gates is a 57 y.o. male.    Chief Complaint: Insect Bite (bites generalized x 10 ago, no itching)    HPI: 57 y.o. White male, was on a trip and used a dirty filling station, thinks he came in contact with mites.  Then stayed in a small hotel, and may have picked up bedbug bites.  This all happened 10 days ago.      Review of Systems   Constitutional: Negative for fatigue and fever.   HENT: Negative for congestion, rhinorrhea and sore throat.    Eyes: Negative for pain.   Respiratory: Negative for cough and shortness of breath.    Gastrointestinal: Negative for diarrhea and vomiting.       Objective:   /80 (BP Location: Left arm, Patient Position: Sitting, BP Method: Medium (Manual))   Pulse 61   Temp 98 °F (36.7 °C) (Oral)   Resp 18   Ht 6' (1.829 m)   Wt 75.8 kg (167 lb 1.6 oz)   SpO2 98%   BMI 22.66 kg/m²     Physical Exam   Constitutional: He is oriented to person, place, and time. He appears well-developed and well-nourished.   HENT:   Head: Normocephalic and atraumatic.   Eyes: Conjunctivae are normal. Pupils are equal, round, and reactive to light.   Musculoskeletal: Normal range of motion.   Neurological: He is alert and oriented to person, place, and time.   Skin: Rash noted. Rash is macular, papular and urticarial.        Several different types of pinpoint red lesions on both arms.  Some in clusters of 3, most are independent of each other.  Some with 2 bite like punctures, others with one.  No cellulitis.   Nursing note and vitals reviewed.      Assessment:     1. Multiple insect bites      Plan:     Multiple insect bites    Treat symptomatically. Calamine lotion, steroid cream.  Check bed linen,closet,suitcase.  Return prn.    Answers for HPI/ROS submitted by the patient on 10/21/2018   Rash  Chronicity: new  Onset: in the past 7 days  Progression since onset: waxing and waning  Affected locations: head, neck, left arm, right arm  Characteristics:  redness  Exposed to: an insect bite/sting, plant contact, a spider bite  anorexia: No  facial edema: No  joint pain: No  nail changes: No  Treatments tried: nothing  asthma: No  allergies: Yes  eczema: No  varicella: No

## 2018-10-31 ENCOUNTER — TELEPHONE (OUTPATIENT)
Dept: INTERNAL MEDICINE | Facility: CLINIC | Age: 58
End: 2018-10-31

## 2018-10-31 NOTE — TELEPHONE ENCOUNTER
----- Message from Zainab Love sent at 10/31/2018 10:51 AM CDT -----  Contact: Love Ruiz from Dr. hiram Herring [phone]  The nurse from Dr. Hiram Herring is requesting to talk to Dr. Mike.

## 2018-11-01 ENCOUNTER — TELEPHONE (OUTPATIENT)
Dept: INTERNAL MEDICINE | Facility: CLINIC | Age: 58
End: 2018-11-01

## 2018-11-01 NOTE — TELEPHONE ENCOUNTER
----- Message from Faustina Fernandez sent at 11/1/2018 11:07 AM CDT -----  Contact: [phone]/ self   Patient requesting to speak with you regarding if he'll be able to been today 11/1/18 instead of tomorrow 11/2/18 for injection. Please advise.

## 2018-11-02 ENCOUNTER — OFFICE VISIT (OUTPATIENT)
Dept: INTERNAL MEDICINE | Facility: CLINIC | Age: 58
End: 2018-11-02
Payer: COMMERCIAL

## 2018-11-02 VITALS
TEMPERATURE: 98 F | HEART RATE: 69 BPM | DIASTOLIC BLOOD PRESSURE: 80 MMHG | OXYGEN SATURATION: 98 % | WEIGHT: 169.31 LBS | BODY MASS INDEX: 22.93 KG/M2 | SYSTOLIC BLOOD PRESSURE: 130 MMHG | RESPIRATION RATE: 18 BRPM | HEIGHT: 72 IN

## 2018-11-02 DIAGNOSIS — A51.49: Primary | ICD-10-CM

## 2018-11-02 DIAGNOSIS — B20 HIV DISEASE: Chronic | ICD-10-CM

## 2018-11-02 PROCEDURE — 96372 THER/PROPH/DIAG INJ SC/IM: CPT | Mod: S$GLB,ICN,, | Performed by: INTERNAL MEDICINE

## 2018-11-02 PROCEDURE — 3008F BODY MASS INDEX DOCD: CPT | Mod: CPTII,S$GLB,ICN, | Performed by: INTERNAL MEDICINE

## 2018-11-02 PROCEDURE — 99213 OFFICE O/P EST LOW 20 MIN: CPT | Mod: 25,S$GLB,ICN, | Performed by: INTERNAL MEDICINE

## 2018-11-02 PROCEDURE — 99999 PR PBB SHADOW E&M-EST. PATIENT-LVL III: CPT | Mod: PBBFAC,,, | Performed by: INTERNAL MEDICINE

## 2018-11-02 RX ORDER — DOXYCYCLINE HYCLATE 100 MG
100 TABLET ORAL EVERY 12 HOURS
Qty: 60 TABLET | Refills: 1 | Status: SHIPPED | OUTPATIENT
Start: 2018-11-02 | End: 2018-11-02 | Stop reason: SDUPTHER

## 2018-11-02 RX ORDER — DOXYCYCLINE HYCLATE 100 MG
100 TABLET ORAL EVERY 12 HOURS
Qty: 60 TABLET | Refills: 1 | Status: SHIPPED | OUTPATIENT
Start: 2018-11-02 | End: 2019-11-04

## 2018-11-02 NOTE — PROGRESS NOTES
Subjective:      Patient ID: Reji Gates is a 57 y.o. male.    Chief Complaint: Injections (pt in for injection) and Discussion (pt bumps are not bed bug bites)    HPI: 57 y.o. White male, developed worsening rash, to include face. Went to his Dermatologist, who did a biopsy  And went to his Colorectal surgeon who did a procto and biopsied a lesion in rectum. So far the skin lesions appear to be  Lymphocytic in nature, and more consistent with secondary syphilis.  The serology is still pending, as is the biopsy of his rectal area.    Last RPR  8/28/18:  1:16.  This is probably re-infection, and new primary/secondary.  Discussed timing,contacts.    Review of Systems   Constitutional: Negative for activity change.   HENT: Negative.    Eyes: Negative for visual disturbance.   Gastrointestinal: Negative for rectal pain.   Musculoskeletal: Negative for arthralgias, back pain and gait problem.   Skin: Positive for rash and wound. Negative for color change.   Psychiatric/Behavioral: Negative.        Objective:   /80 (BP Location: Left arm, Patient Position: Sitting, BP Method: Medium (Manual))   Pulse 69   Temp 97.9 °F (36.6 °C) (Oral)   Resp 18   Ht 6' (1.829 m)   Wt 76.8 kg (169 lb 4.8 oz)   SpO2 98%   BMI 22.96 kg/m²     Physical Exam   Constitutional: He is oriented to person, place, and time. He appears well-developed and well-nourished.   HENT:   Head: Normocephalic and atraumatic.   Mouth/Throat: Oropharynx is clear and moist.   Neurological: He is alert and oriented to person, place, and time.   Skin: Skin is warm and dry. Rash noted.   Face,arms,hands.   Nursing note and vitals reviewed.      Assessment:     1. Secondary symptomatic early syphilis    2. HIV disease      Plan:     Secondary symptomatic early syphilis  -     Discontinue: doxycycline (VIBRA-TABS) 100 MG tablet; Take 1 tablet (100 mg total) by mouth every 12 (twelve) hours.  Dispense: 60 tablet; Refill: 1  -     penicillin G  benzathine (BICILLIN LA) injection 2.4 Million Units  -     doxycycline (VIBRA-TABS) 100 MG tablet; Take 1 tablet (100 mg total) by mouth every 12 (twelve) hours.  Dispense: 60 tablet; Refill: 1    HIV disease    He needs his 2 contacts to be checked.

## 2018-11-09 ENCOUNTER — CLINICAL SUPPORT (OUTPATIENT)
Dept: INTERNAL MEDICINE | Facility: CLINIC | Age: 58
End: 2018-11-09
Payer: COMMERCIAL

## 2018-11-09 DIAGNOSIS — B20 HIV DISEASE: Chronic | ICD-10-CM

## 2018-11-09 DIAGNOSIS — A51.49: Primary | ICD-10-CM

## 2018-11-09 PROCEDURE — 96372 THER/PROPH/DIAG INJ SC/IM: CPT | Mod: S$GLB,,, | Performed by: INTERNAL MEDICINE

## 2018-11-16 ENCOUNTER — CLINICAL SUPPORT (OUTPATIENT)
Dept: INTERNAL MEDICINE | Facility: CLINIC | Age: 58
End: 2018-11-16
Payer: COMMERCIAL

## 2018-11-20 NOTE — TELEPHONE ENCOUNTER
----- Message from Maxime Garcia sent at 11/20/2018  2:09 PM CST -----  Contact: [facility]  Refill request for azelastine (OPTIVAR) 0.05 % ophthalmic solution  Pharmacy: [facility] [id number] [facility] [id number] AT [address]

## 2018-11-21 RX ORDER — AZELASTINE HYDROCHLORIDE 0.5 MG/ML
1 SOLUTION/ DROPS OPHTHALMIC DAILY
Qty: 6 ML | Refills: 2 | Status: SHIPPED | OUTPATIENT
Start: 2018-11-21 | End: 2020-03-19 | Stop reason: CLARIF

## 2018-11-28 ENCOUNTER — TELEPHONE (OUTPATIENT)
Dept: INTERNAL MEDICINE | Facility: CLINIC | Age: 58
End: 2018-11-28

## 2018-11-28 DIAGNOSIS — B20 HIV DISEASE: Primary | Chronic | ICD-10-CM

## 2018-11-28 NOTE — TELEPHONE ENCOUNTER
Spoke to patient, he wanted to make sure his blood work orders were in. confirmed that orders are in and he can go to [facility].

## 2018-11-28 NOTE — TELEPHONE ENCOUNTER
----- Message from Yohana Valle sent at 11/28/2018  9:03 AM CST -----  Contact: Self/ [phone]  Patient called in requesting to speak with you. Patient prefers to speak with a nurse.     Please call and advise.

## 2018-12-03 LAB
ALBUMIN SERPL-MCNC: 4 G/DL (ref 3.6–5.1)
ALBUMIN/GLOB SERPL: 1.7 (CALC) (ref 1–2.5)
ALP SERPL-CCNC: 88 U/L (ref 40–115)
ALT SERPL-CCNC: 26 U/L (ref 9–46)
AST SERPL-CCNC: 31 U/L (ref 10–35)
BILIRUB SERPL-MCNC: 0.4 MG/DL (ref 0.2–1.2)
BUN SERPL-MCNC: 22 MG/DL (ref 7–25)
BUN/CREAT SERPL: NORMAL (CALC) (ref 6–22)
CALCIUM SERPL-MCNC: 8.9 MG/DL (ref 8.6–10.3)
CD3+CD4+ CELLS # BLD: 747 CELLS/UL (ref 490–1740)
CD3+CD4+ CELLS NFR BLD: 45 % (ref 30–61)
CHLORIDE SERPL-SCNC: 106 MMOL/L (ref 98–110)
CHOLEST SERPL-MCNC: 113 MG/DL
CHOLEST/HDLC SERPL: 3.2 (CALC)
CO2 SERPL-SCNC: 26 MMOL/L (ref 20–32)
CREAT SERPL-MCNC: 1.04 MG/DL (ref 0.7–1.33)
GFR SERPL CREATININE-BSD FRML MDRD: 78 ML/MIN/1.73M2
GLOBULIN SER CALC-MCNC: 2.4 G/DL (CALC) (ref 1.9–3.7)
GLUCOSE SERPL-MCNC: 86 MG/DL (ref 65–99)
HDLC SERPL-MCNC: 35 MG/DL
HIV1 RNA # SERPL NAA+PROBE: NORMAL COPIES/ML
HIV1 RNA SERPL NAA+PROBE-LOG#: NORMAL LOG COPIES/ML
LDLC SERPL CALC-MCNC: 63 MG/DL (CALC)
LYMPHOCYTES # BLD AUTO: 1649 CELLS/UL (ref 850–3900)
NONHDLC SERPL-MCNC: 78 MG/DL (CALC)
POTASSIUM SERPL-SCNC: 4.4 MMOL/L (ref 3.5–5.3)
PROT SERPL-MCNC: 6.4 G/DL (ref 6.1–8.1)
RPR SER QL: REACTIVE
RPR SER-TITR: ABNORMAL {TITER}
SODIUM SERPL-SCNC: 138 MMOL/L (ref 135–146)
TESTOST FREE SERPL-MCNC: 177.4 PG/ML (ref 35–155)
TESTOST SERPL-MCNC: 849 NG/DL (ref 250–1100)
TRIGL SERPL-MCNC: 71 MG/DL
TSH SERPL-ACNC: 2.13 MIU/L (ref 0.4–4.5)

## 2018-12-13 ENCOUNTER — OFFICE VISIT (OUTPATIENT)
Dept: INTERNAL MEDICINE | Facility: CLINIC | Age: 58
End: 2018-12-13
Payer: COMMERCIAL

## 2018-12-13 VITALS
TEMPERATURE: 98 F | SYSTOLIC BLOOD PRESSURE: 118 MMHG | RESPIRATION RATE: 18 BRPM | WEIGHT: 168.13 LBS | HEIGHT: 72 IN | OXYGEN SATURATION: 97 % | DIASTOLIC BLOOD PRESSURE: 70 MMHG | BODY MASS INDEX: 22.77 KG/M2 | HEART RATE: 60 BPM

## 2018-12-13 DIAGNOSIS — A53.9: ICD-10-CM

## 2018-12-13 DIAGNOSIS — B20 HIV DISEASE: Primary | Chronic | ICD-10-CM

## 2018-12-13 PROCEDURE — 99999 PR PBB SHADOW E&M-EST. PATIENT-LVL III: CPT | Mod: PBBFAC,,, | Performed by: INTERNAL MEDICINE

## 2018-12-13 PROCEDURE — 3008F BODY MASS INDEX DOCD: CPT | Mod: CPTII,S$GLB,, | Performed by: INTERNAL MEDICINE

## 2018-12-13 PROCEDURE — 99214 OFFICE O/P EST MOD 30 MIN: CPT | Mod: S$GLB,,, | Performed by: INTERNAL MEDICINE

## 2018-12-13 NOTE — PROGRESS NOTES
Subjective:      Patient ID: Reji Gates is a 57 y.o. male.    Chief Complaint: Follow-up (4 week follow up); Results (lab results); and Medication Refill    HPI: 57 y.o. White male, follows up from a + RPR.  1:32.  It appears he may have been passing this back and forth to his partner over that past years.  He has been treated on multiple occasions, for secondary syphilis.  His partner has told him he has been treated also.  This time, however, he has also had a proctoscopy, looking for moluscum  And any other  Lesions. ( sees Dr. Whittaker).      Review of Systems   Constitutional: Negative for activity change and unexpected weight change.   HENT: Negative for hearing loss, rhinorrhea and trouble swallowing.    Eyes: Negative for discharge and visual disturbance.   Respiratory: Negative for chest tightness and wheezing.    Cardiovascular: Negative for chest pain and palpitations.   Gastrointestinal: Negative for blood in stool, constipation, diarrhea and vomiting.   Endocrine: Negative for polydipsia and polyuria.   Genitourinary: Positive for difficulty urinating and urgency. Negative for hematuria.   Musculoskeletal: Positive for arthralgias and joint swelling. Negative for neck pain.   Skin: Negative for color change and rash.   Neurological: Negative for weakness and headaches.   Psychiatric/Behavioral: Positive for confusion and dysphoric mood.       Objective:   /70 (BP Location: Left arm, Patient Position: Sitting, BP Method: Medium (Manual))   Pulse 60   Temp 98.1 °F (36.7 °C) (Oral)   Resp 18   Ht 6' (1.829 m)   Wt 76.2 kg (168 lb 1.6 oz)   SpO2 97%   BMI 22.80 kg/m²     Physical Exam   Constitutional: He is oriented to person, place, and time. He appears well-developed and well-nourished.   HENT:   Head: Normocephalic and atraumatic.   Right Ear: External ear normal.   Left Ear: External ear normal.   Nose: Nose normal.   Mouth/Throat: Oropharynx is clear and moist.   Eyes:  Conjunctivae and EOM are normal. Pupils are equal, round, and reactive to light.   Neck: Normal range of motion. Neck supple.   Cardiovascular: Normal rate, regular rhythm and normal heart sounds.   Pulmonary/Chest: Effort normal and breath sounds normal.   Abdominal: Soft. Bowel sounds are normal.   Musculoskeletal: Normal range of motion.   Neurological: He is alert and oriented to person, place, and time.   Skin: Skin is warm and dry. No rash noted.   Psychiatric: He has a normal mood and affect. His behavior is normal. Judgment and thought content normal.   Nursing note and vitals reviewed.      Assessment:     1. HIV disease    2. Lues    Long discussion, re safe sex, precautions,need to check every 3 months.  Plan:     HIV disease  -     HIV RNA, quantitative, PCR; Future; Expected date: 04/13/2019  -     CBC auto differential; Future; Expected date: 04/12/2019  -     Comprehensive metabolic panel; Future; Expected date: 04/12/2019  -     Lipid panel; Future; Expected date: 04/12/2019  -     Urinalysis; Future; Expected date: 04/12/2019  -     Lymphocyte Subset Panel 5 T-[place]/Inducer; Future; Expected date: 12/13/2018    Lues  -     RPR (DX) with reflex to titer and confirmatory testing; Future; Expected date: 12/13/2018    Other orders  -     Cancel: penicillin G benzathine (BICILLIN LA) injection 2.4 Million Units

## 2019-04-17 LAB
ALBUMIN SERPL-MCNC: 4.2 G/DL (ref 3.6–5.1)
ALBUMIN/GLOB SERPL: 1.7 (CALC) (ref 1–2.5)
ALP SERPL-CCNC: 94 U/L (ref 40–115)
ALT SERPL-CCNC: 26 U/L (ref 9–46)
APPEARANCE UR: CLEAR
AST SERPL-CCNC: 32 U/L (ref 10–35)
BASOPHILS # BLD AUTO: 48 CELLS/UL (ref 0–200)
BASOPHILS NFR BLD AUTO: 0.9 %
BILIRUB SERPL-MCNC: 0.6 MG/DL (ref 0.2–1.2)
BILIRUB UR QL STRIP: NEGATIVE
BUN SERPL-MCNC: 28 MG/DL (ref 7–25)
BUN/CREAT SERPL: 29 (CALC) (ref 6–22)
CALCIUM SERPL-MCNC: 9.2 MG/DL (ref 8.6–10.3)
CD3+CD4+ CELLS # BLD: 789 CELLS/UL (ref 490–1740)
CD3+CD4+ CELLS NFR BLD: 40 % (ref 30–61)
CHLORIDE SERPL-SCNC: 105 MMOL/L (ref 98–110)
CHOLEST SERPL-MCNC: 122 MG/DL
CHOLEST/HDLC SERPL: 3 (CALC)
CO2 SERPL-SCNC: 30 MMOL/L (ref 20–32)
COLOR UR: YELLOW
CREAT SERPL-MCNC: 0.98 MG/DL (ref 0.7–1.25)
EOSINOPHIL # BLD AUTO: 138 CELLS/UL (ref 15–500)
EOSINOPHIL NFR BLD AUTO: 2.6 %
ERYTHROCYTE [DISTWIDTH] IN BLOOD BY AUTOMATED COUNT: 13.2 % (ref 11–15)
GFRSERPLBLD MDRD-ARVRAT: 83 ML/MIN/1.73M2
GLOBULIN SER CALC-MCNC: 2.5 G/DL (CALC) (ref 1.9–3.7)
GLUCOSE SERPL-MCNC: 88 MG/DL (ref 65–99)
GLUCOSE UR QL STRIP: NEGATIVE
HCT VFR BLD AUTO: 46.3 % (ref 38.5–50)
HDLC SERPL-MCNC: 41 MG/DL
HGB BLD-MCNC: 15.1 G/DL (ref 13.2–17.1)
HGB UR QL STRIP: NEGATIVE
HIV1 RNA # SERPL NAA+PROBE: NORMAL COPIES/ML
HIV1 RNA SERPL NAA+PROBE-LOG#: NORMAL LOG COPIES/ML
KETONES UR QL STRIP: NEGATIVE
LDLC SERPL CALC-MCNC: 70 MG/DL (CALC)
LEUKOCYTE ESTERASE UR QL STRIP: NEGATIVE
LYMPHOCYTES # BLD AUTO: 1839 CELLS/UL (ref 850–3900)
LYMPHOCYTES # BLD AUTO: 1993 CELLS/UL (ref 850–3900)
LYMPHOCYTES NFR BLD AUTO: 34.7 %
MCH RBC QN AUTO: 30.9 PG (ref 27–33)
MCHC RBC AUTO-ENTMCNC: 32.6 G/DL (ref 32–36)
MCV RBC AUTO: 94.7 FL (ref 80–100)
MONOCYTES # BLD AUTO: 498 CELLS/UL (ref 200–950)
MONOCYTES NFR BLD AUTO: 9.4 %
NEUTROPHILS # BLD AUTO: 2777 CELLS/UL (ref 1500–7800)
NEUTROPHILS NFR BLD AUTO: 52.4 %
NITRITE UR QL STRIP: NEGATIVE
NONHDLC SERPL-MCNC: 81 MG/DL (CALC)
PH UR STRIP: 7 [PH] (ref 5–8)
PLATELET # BLD AUTO: 224 THOUSAND/UL (ref 140–400)
PMV BLD REES-ECKER: 10.9 FL (ref 7.5–12.5)
POTASSIUM SERPL-SCNC: 4.5 MMOL/L (ref 3.5–5.3)
PROT SERPL-MCNC: 6.7 G/DL (ref 6.1–8.1)
PROT UR QL STRIP: NEGATIVE
RBC # BLD AUTO: 4.89 MILLION/UL (ref 4.2–5.8)
RPR SER QL: REACTIVE
RPR SER-TITR: ABNORMAL {TITER}
SODIUM SERPL-SCNC: 140 MMOL/L (ref 135–146)
SP GR UR STRIP: 1.02 (ref 1–1.03)
T PALLIDUM AB SER QL IF: REACTIVE
TRIGL SERPL-MCNC: 40 MG/DL
WBC # BLD AUTO: 5.3 THOUSAND/UL (ref 3.8–10.8)

## 2019-04-25 DIAGNOSIS — Z13.220 ENCOUNTER FOR LIPID SCREENING FOR CARDIOVASCULAR DISEASE: Primary | ICD-10-CM

## 2019-04-25 DIAGNOSIS — Z13.6 ENCOUNTER FOR LIPID SCREENING FOR CARDIOVASCULAR DISEASE: Primary | ICD-10-CM

## 2019-04-30 ENCOUNTER — TELEPHONE (OUTPATIENT)
Dept: FAMILY MEDICINE | Facility: CLINIC | Age: 59
End: 2019-04-30

## 2019-04-30 NOTE — TELEPHONE ENCOUNTER
----- Message from Swapna Blankenship sent at 4/30/2019  2:24 PM CDT -----  April with [facility] called regarding medical records.   What is the status.  No.  [phone]  Ext. [phone]     Please call.

## 2019-05-08 ENCOUNTER — OFFICE VISIT (OUTPATIENT)
Dept: INTERNAL MEDICINE | Facility: CLINIC | Age: 59
End: 2019-05-08
Payer: COMMERCIAL

## 2019-05-08 VITALS
HEIGHT: 72 IN | HEART RATE: 68 BPM | RESPIRATION RATE: 18 BRPM | BODY MASS INDEX: 22.42 KG/M2 | TEMPERATURE: 98 F | SYSTOLIC BLOOD PRESSURE: 120 MMHG | DIASTOLIC BLOOD PRESSURE: 70 MMHG | WEIGHT: 165.5 LBS | OXYGEN SATURATION: 97 %

## 2019-05-08 DIAGNOSIS — E29.1 HYPOGONADISM IN MALE: Primary | Chronic | ICD-10-CM

## 2019-05-08 DIAGNOSIS — B20 HIV DISEASE: Chronic | ICD-10-CM

## 2019-05-08 DIAGNOSIS — M19.90 ARTHRITIS: ICD-10-CM

## 2019-05-08 DIAGNOSIS — A53.9: ICD-10-CM

## 2019-05-08 DIAGNOSIS — F51.02 ADJUSTMENT INSOMNIA: Chronic | ICD-10-CM

## 2019-05-08 PROCEDURE — 99214 PR OFFICE/OUTPT VISIT, EST, LEVL IV, 30-39 MIN: ICD-10-PCS | Mod: S$GLB,,, | Performed by: INTERNAL MEDICINE

## 2019-05-08 PROCEDURE — 99999 PR PBB SHADOW E&M-EST. PATIENT-LVL III: ICD-10-PCS | Mod: PBBFAC,,, | Performed by: INTERNAL MEDICINE

## 2019-05-08 PROCEDURE — 99999 PR PBB SHADOW E&M-EST. PATIENT-LVL III: CPT | Mod: PBBFAC,,, | Performed by: INTERNAL MEDICINE

## 2019-05-08 PROCEDURE — 99214 OFFICE O/P EST MOD 30 MIN: CPT | Mod: S$GLB,,, | Performed by: INTERNAL MEDICINE

## 2019-05-08 RX ORDER — HYDROCODONE BITARTRATE AND IBUPROFEN 7.5; 2 MG/1; MG/1
1 TABLET, FILM COATED ORAL EVERY 6 HOURS PRN
Qty: 30 TABLET | Refills: 0 | Status: SHIPPED | OUTPATIENT
Start: 2019-05-08 | End: 2019-11-04 | Stop reason: SDUPTHER

## 2019-05-08 RX ORDER — ALPRAZOLAM 0.5 MG/1
0.5 TABLET ORAL DAILY
Qty: 30 TABLET | Refills: 3 | Status: SHIPPED | OUTPATIENT
Start: 2019-05-08 | End: 2019-11-04 | Stop reason: SDUPTHER

## 2019-05-08 RX ORDER — TESTOSTERONE CYPIONATE 200 MG/ML
200 INJECTION, SOLUTION INTRAMUSCULAR
Qty: 10 ML | Refills: 3 | Status: SHIPPED | OUTPATIENT
Start: 2019-05-08 | End: 2020-03-19 | Stop reason: CLARIF

## 2019-05-08 NOTE — PROGRESS NOTES
Subjective:      Patient ID: Reji Gates is a 58 y.o. male.    Chief Complaint: Follow-up (4 month follow up); Results (lab results); Medication Refill; Hep C screen (pt due); walking (seem sometimes unsure of taking steps where foot hit floor); and Pain (left foot heel)    HPI: 58 y.o. White male, finally out of his relationship. This was the partner at always said he was treated for Lues,  But pt would get re-infected every few months.    Now RPR 1:16  CD4 789          Hiv viral load non detected  04/11/19 1119 HDL 41 -- Final result   04/11/19 1119 CHOL 122 -- Final result   04/11/19 1119 TRIG 40 -- Final result   04/11/19 1119 LDLCALC 70 -- Final result   04/11/19 1119 CHOLHDL 3.0 -- Final result   04/11/19 1119 NONHDLCHOL 81 -- Final result   04/11/19 1119 COLORU YELLOW -- Final result   04/11/19 1119 APPEARANCEUA CLEAR -- Final result   04/11/19 1119 SPECGRAV 1.019 -- Final result   04/11/19 1119 PHUR 7.0 -- Final result   04/11/19 1119 BILIRUBINUR NEGATIVE -- Final result   04/11/19 1119 KETONESU NEGATIVE -- Final result   04/11/19 1119 OCCULTUA NEGATIVE -- Final result   04/11/19 1119 NITRITE NEGATIVE -- Final result   01/31/17 1102 UROBILINOGEN 0.2 -- Final result   04/11/19 1119 LEUKOCYTESUR NEGATIVE -- Final result   04/11/19 1119 WBC 5.3 -- Final result   04/11/19 1119 RBC 4.89 -- Final result   04/11/19 1119 HGB 15.1 -- Final result   04/11/19 1119 HCT 46.3 -- Final result   04/11/19 1119 MCH 30.9 -- Final result   04/11/19 1119 RDW 13.2 -- Final result   04/11/19 1119  -- Final result   04/11/19 1119 MPV 10.9 -- Final result   04/11/19 1119 GLU 88 -- Final result   04/11/19 1119 BUN 28 High Final result   04/11/19 1119 CREATININE 0.98 -- Final result   04/11/19 1119 CALCIUM 9.2 -- Final result   04/11/19 1119  -- Final result   04/11/19 1119 K 4.5 -- Final result   04/11/19 1119  -- Final result   04/11/19 1119 PROT 6.7 -- Final result   04/11/19 1119 ALBUMIN 4.2 -- Final  result   04/11/19 1119 BILITOT 0.6 -- Final result   04/11/19 1119 AST 32 -- Final result   04/11/19 1119 ALKPHOS 94 -- Final result   04/11/19 1119 CO2 30 -- Final result   04/11/19 1119 ALT 26 -- Final result   04/11/19 1119 EGFRNONAA 83 -- Final result   04/11/19 1119 ESTGFRAFRICA 97 -- Final result   04/11/19 1119 MCV 94.7 --          Review of Systems   Constitutional: Negative for activity change and unexpected weight change.   HENT: Positive for hearing loss. Negative for rhinorrhea and trouble swallowing.    Eyes: Positive for visual disturbance. Negative for discharge.   Respiratory: Negative for chest tightness and wheezing.    Cardiovascular: Negative for chest pain and palpitations.   Gastrointestinal: Negative for blood in stool, constipation, diarrhea and vomiting.   Endocrine: Negative for polydipsia and polyuria.   Genitourinary: Positive for difficulty urinating and urgency. Negative for hematuria.   Musculoskeletal: Positive for arthralgias, back pain and joint swelling. Negative for neck pain.   Neurological: Negative for weakness and headaches.   Psychiatric/Behavioral: Positive for dysphoric mood. Negative for confusion.       Objective:   /70 (BP Location: Left arm, Patient Position: Sitting, BP Method: X-Large (Manual))   Pulse 68   Temp 98.1 °F (36.7 °C) (Oral)   Resp 18   Ht 6' (1.829 m)   Wt 75.1 kg (165 lb 8 oz)   SpO2 97%   BMI 22.45 kg/m²     Physical Exam   Constitutional: He is oriented to person, place, and time. He appears well-developed and well-nourished.   HENT:   Head: Normocephalic and atraumatic.   Nose: Nose normal.   Mouth/Throat: Oropharynx is clear and moist.   Eyes: Conjunctivae and EOM are normal.   Neck: Normal range of motion. Neck supple.   Cardiovascular: Normal rate, regular rhythm and normal heart sounds.   Pulmonary/Chest: Effort normal and breath sounds normal.   Abdominal: Soft. Bowel sounds are normal.   Musculoskeletal: Normal range of motion.  "  Neurological: He is alert and oriented to person, place, and time.   Skin: Skin is warm and dry.   Psychiatric: He has a normal mood and affect. His behavior is normal.   Nursing note and vitals reviewed.      Assessment:     1. Hypogonadism in male    2. HIV disease    3. Adjustment insomnia    4. Arthritis    5. Lues    Stable and doing well.  Continue same HAART  Plan:     Hypogonadism in male  -     testosterone cypionate (DEPOTESTOTERONE CYPIONATE) 200 mg/mL injection; Inject 1 mL (200 mg total) into the muscle every 14 (fourteen) days.  Dispense: 10 mL; Refill: 3  -     syringe with needle, safety 3 mL 20 gauge x 1 1/2" Syrg; 1 Device by Misc.(Non-Drug; Combo Route) route every 14 (fourteen) days.  Dispense: 20 Syringe; Refill: 3    HIV disease    Adjustment insomnia  -     ALPRAZolam (XANAX) 0.5 MG tablet; Take 1 tablet (0.5 mg total) by mouth once daily.  Dispense: 30 tablet; Refill: 3    Arthritis  -     hydrocodone-ibuprofen 7.5-200 mg (VICOPROFEN) 7.5-200 mg per tablet; Take 1 tablet by mouth every 6 (six) hours as needed for Pain.  Dispense: 30 tablet; Refill: 0    Lues        "

## 2019-06-14 DIAGNOSIS — H46.9 OPTIC NEURITIS, LEFT: ICD-10-CM

## 2019-06-14 RX ORDER — ACYCLOVIR 800 MG/1
800 TABLET ORAL DAILY
Qty: 90 TABLET | Refills: 1 | Status: SHIPPED | OUTPATIENT
Start: 2019-06-14 | End: 2019-06-14 | Stop reason: SDUPTHER

## 2019-06-14 NOTE — TELEPHONE ENCOUNTER
----- Message from Stephanie Dumont sent at 6/14/2019 12:58 PM CDT -----  Contact: [phone]/self  Refill request for:  acyclovir (ZOVIRAX) 800 MG Tab    Be sent to:  [facility] [id number] [facility] [address] AT [address]

## 2019-06-17 RX ORDER — ACYCLOVIR 800 MG/1
800 TABLET ORAL DAILY
Qty: 90 TABLET | Refills: 1 | Status: SHIPPED | OUTPATIENT
Start: 2019-06-17 | End: 2019-11-04 | Stop reason: SDUPTHER

## 2019-09-06 DIAGNOSIS — J30.9 ALLERGIC RHINITIS, UNSPECIFIED SEASONALITY, UNSPECIFIED TRIGGER: ICD-10-CM

## 2019-09-06 RX ORDER — MONTELUKAST SODIUM 10 MG/1
10 TABLET ORAL NIGHTLY
Qty: 90 TABLET | Refills: 0 | Status: SHIPPED | OUTPATIENT
Start: 2019-09-06 | End: 2019-11-04 | Stop reason: SDUPTHER

## 2019-09-06 NOTE — TELEPHONE ENCOUNTER
----- Message from Domonique Thomas sent at 9/6/2019 12:22 PM CDT -----  Contact: Diana mckenzie/ [facility] [phone]  Clifford is requesting a refill for montelukast (SINGULAIR) 10 mg tablet. She states that they faxed a request to your office multiple times and have received a response.  Please advise

## 2019-10-24 ENCOUNTER — TELEPHONE (OUTPATIENT)
Dept: FAMILY MEDICINE | Facility: CLINIC | Age: 59
End: 2019-10-24

## 2019-10-24 DIAGNOSIS — Z86.19 HISTORY OF SYPHILIS: ICD-10-CM

## 2019-10-24 DIAGNOSIS — B20 HIV DISEASE: Primary | Chronic | ICD-10-CM

## 2019-10-26 LAB
ALBUMIN SERPL-MCNC: 4.3 G/DL (ref 3.6–5.1)
ALBUMIN/GLOB SERPL: 1.9 (CALC) (ref 1–2.5)
ALP SERPL-CCNC: 78 U/L (ref 40–115)
ALT SERPL-CCNC: 24 U/L (ref 9–46)
APPEARANCE UR: CLEAR
AST SERPL-CCNC: 31 U/L (ref 10–35)
BASOPHILS # BLD AUTO: 39 CELLS/UL (ref 0–200)
BASOPHILS NFR BLD AUTO: 0.7 %
BILIRUB SERPL-MCNC: 0.5 MG/DL (ref 0.2–1.2)
BILIRUB UR QL STRIP: NEGATIVE
BUN SERPL-MCNC: 25 MG/DL (ref 7–25)
BUN/CREAT SERPL: NORMAL (CALC) (ref 6–22)
CALCIUM SERPL-MCNC: 9.3 MG/DL (ref 8.6–10.3)
CHLORIDE SERPL-SCNC: 104 MMOL/L (ref 98–110)
CHOLEST SERPL-MCNC: 117 MG/DL
CHOLEST/HDLC SERPL: 3 (CALC)
CO2 SERPL-SCNC: 27 MMOL/L (ref 20–32)
COLOR UR: YELLOW
CREAT SERPL-MCNC: 1.06 MG/DL (ref 0.7–1.25)
EOSINOPHIL # BLD AUTO: 121 CELLS/UL (ref 15–500)
EOSINOPHIL NFR BLD AUTO: 2.2 %
ERYTHROCYTE [DISTWIDTH] IN BLOOD BY AUTOMATED COUNT: 12.8 % (ref 11–15)
GFRSERPLBLD MDRD-ARVRAT: 76 ML/MIN/1.73M2
GLOBULIN SER CALC-MCNC: 2.3 G/DL (CALC) (ref 1.9–3.7)
GLUCOSE SERPL-MCNC: 90 MG/DL (ref 65–99)
GLUCOSE UR QL STRIP: NEGATIVE
HCT VFR BLD AUTO: 45.8 % (ref 38.5–50)
HDLC SERPL-MCNC: 39 MG/DL
HGB BLD-MCNC: 15.5 G/DL (ref 13.2–17.1)
HGB UR QL STRIP: NEGATIVE
HIV1 RNA # SERPL NAA+PROBE: NORMAL COPIES/ML
HIV1 RNA SERPL NAA+PROBE-LOG#: NORMAL LOG COPIES/ML
KETONES UR QL STRIP: NEGATIVE
LDLC SERPL CALC-MCNC: 61 MG/DL (CALC)
LEUKOCYTE ESTERASE UR QL STRIP: NEGATIVE
LYMPHOCYTES # BLD AUTO: 1513 CELLS/UL (ref 850–3900)
LYMPHOCYTES NFR BLD AUTO: 27.5 %
MCH RBC QN AUTO: 30.3 PG (ref 27–33)
MCHC RBC AUTO-ENTMCNC: 33.8 G/DL (ref 32–36)
MCV RBC AUTO: 89.6 FL (ref 80–100)
MONOCYTES # BLD AUTO: 451 CELLS/UL (ref 200–950)
MONOCYTES NFR BLD AUTO: 8.2 %
NEUTROPHILS # BLD AUTO: 3377 CELLS/UL (ref 1500–7800)
NEUTROPHILS NFR BLD AUTO: 61.4 %
NITRITE UR QL STRIP: NEGATIVE
NONHDLC SERPL-MCNC: 78 MG/DL (CALC)
PH UR STRIP: 6 [PH] (ref 5–8)
PLATELET # BLD AUTO: 226 THOUSAND/UL (ref 140–400)
PMV BLD REES-ECKER: 10.6 FL (ref 7.5–12.5)
POTASSIUM SERPL-SCNC: 4.4 MMOL/L (ref 3.5–5.3)
PROT SERPL-MCNC: 6.6 G/DL (ref 6.1–8.1)
PROT UR QL STRIP: NEGATIVE
RBC # BLD AUTO: 5.11 MILLION/UL (ref 4.2–5.8)
RPR SER QL: REACTIVE
RPR SER-TITR: ABNORMAL {TITER}
SODIUM SERPL-SCNC: 139 MMOL/L (ref 135–146)
SP GR UR STRIP: 1.02 (ref 1–1.03)
TRIGL SERPL-MCNC: 87 MG/DL
WBC # BLD AUTO: 5.5 THOUSAND/UL (ref 3.8–10.8)

## 2019-11-04 ENCOUNTER — OFFICE VISIT (OUTPATIENT)
Dept: INTERNAL MEDICINE | Facility: CLINIC | Age: 59
End: 2019-11-04
Payer: COMMERCIAL

## 2019-11-04 VITALS
SYSTOLIC BLOOD PRESSURE: 136 MMHG | HEIGHT: 72 IN | BODY MASS INDEX: 23.91 KG/M2 | WEIGHT: 176.5 LBS | HEART RATE: 69 BPM | DIASTOLIC BLOOD PRESSURE: 82 MMHG | OXYGEN SATURATION: 98 % | TEMPERATURE: 98 F

## 2019-11-04 DIAGNOSIS — N40.0 BENIGN PROSTATIC HYPERPLASIA, UNSPECIFIED WHETHER LOWER URINARY TRACT SYMPTOMS PRESENT: ICD-10-CM

## 2019-11-04 DIAGNOSIS — Z23 NEED FOR INFLUENZA VACCINATION: ICD-10-CM

## 2019-11-04 DIAGNOSIS — J30.2 SEASONAL ALLERGIC RHINITIS, UNSPECIFIED TRIGGER: ICD-10-CM

## 2019-11-04 DIAGNOSIS — J30.9 ALLERGIC RHINITIS, UNSPECIFIED SEASONALITY, UNSPECIFIED TRIGGER: ICD-10-CM

## 2019-11-04 DIAGNOSIS — M19.90 ARTHRITIS: ICD-10-CM

## 2019-11-04 DIAGNOSIS — A53.9: Primary | ICD-10-CM

## 2019-11-04 DIAGNOSIS — A53.9 SYPHILIS IN MALE: ICD-10-CM

## 2019-11-04 DIAGNOSIS — F51.02 ADJUSTMENT INSOMNIA: Chronic | ICD-10-CM

## 2019-11-04 DIAGNOSIS — H46.9 OPTIC NEURITIS, LEFT: ICD-10-CM

## 2019-11-04 DIAGNOSIS — E29.1 HYPOGONADISM IN MALE: Chronic | ICD-10-CM

## 2019-11-04 DIAGNOSIS — B20 HIV DISEASE: Chronic | ICD-10-CM

## 2019-11-04 LAB
BASOPHILS NFR CSF MANUAL: 0 %
CLARITY CSF: CLEAR
COLOR CSF: COLORLESS
CSF, COMMENT: 0
EOSINOPHIL NFR CSF MANUAL: 0 %
GLUCOSE CSF-MCNC: 63 MG/DL (ref 40–70)
LYMPHOCYTES NFR CSF MANUAL: 0 % (ref 40–80)
MONOS+MACROS NFR CSF MANUAL: 0 % (ref 15–45)
NEUTROPHILS NFR CSF MANUAL: 0 % (ref 0–6)
OTHER CELLS CSF: 0 %
RBC # CSF: 1 /CU MM
SPECIMEN VOL CSF: 6.8 ML
WBC # CSF: 2 /CU MM (ref 0–5)

## 2019-11-04 PROCEDURE — 90471 FLU VACCINE (QUAD) GREATER THAN OR EQUAL TO 3YO PRESERVATIVE FREE IM: ICD-10-PCS | Mod: S$GLB,,, | Performed by: INTERNAL MEDICINE

## 2019-11-04 PROCEDURE — 99999 PR PBB SHADOW E&M-EST. PATIENT-LVL IV: CPT | Mod: PBBFAC,,, | Performed by: INTERNAL MEDICINE

## 2019-11-04 PROCEDURE — 99999 PR PBB SHADOW E&M-EST. PATIENT-LVL IV: ICD-10-PCS | Mod: PBBFAC,,, | Performed by: INTERNAL MEDICINE

## 2019-11-04 PROCEDURE — 82945 GLUCOSE OTHER FLUID: CPT

## 2019-11-04 PROCEDURE — 99214 PR OFFICE/OUTPT VISIT, EST, LEVL IV, 30-39 MIN: ICD-10-PCS | Mod: 25,S$GLB,, | Performed by: INTERNAL MEDICINE

## 2019-11-04 PROCEDURE — 96372 PR INJECTION,THERAP/PROPH/DIAG2ST, IM OR SUBCUT: ICD-10-PCS | Mod: S$GLB,,, | Performed by: INTERNAL MEDICINE

## 2019-11-04 PROCEDURE — 96372 THER/PROPH/DIAG INJ SC/IM: CPT | Mod: S$GLB,,, | Performed by: INTERNAL MEDICINE

## 2019-11-04 PROCEDURE — 90686 FLU VACCINE (QUAD) GREATER THAN OR EQUAL TO 3YO PRESERVATIVE FREE IM: ICD-10-PCS | Mod: S$GLB,,, | Performed by: INTERNAL MEDICINE

## 2019-11-04 PROCEDURE — 86592 SYPHILIS TEST NON-TREP QUAL: CPT

## 2019-11-04 PROCEDURE — 86403 PARTICLE AGGLUT ANTBDY SCRN: CPT

## 2019-11-04 PROCEDURE — 99214 OFFICE O/P EST MOD 30 MIN: CPT | Mod: 25,S$GLB,, | Performed by: INTERNAL MEDICINE

## 2019-11-04 PROCEDURE — 90686 IIV4 VACC NO PRSV 0.5 ML IM: CPT | Mod: S$GLB,,, | Performed by: INTERNAL MEDICINE

## 2019-11-04 PROCEDURE — 90471 IMMUNIZATION ADMIN: CPT | Mod: S$GLB,,, | Performed by: INTERNAL MEDICINE

## 2019-11-04 RX ORDER — ALPRAZOLAM 0.5 MG/1
0.5 TABLET ORAL DAILY
Qty: 30 TABLET | Refills: 3 | Status: SHIPPED | OUTPATIENT
Start: 2019-11-04

## 2019-11-04 RX ORDER — MONTELUKAST SODIUM 10 MG/1
10 TABLET ORAL NIGHTLY
Qty: 90 TABLET | Refills: 0 | Status: SHIPPED | OUTPATIENT
Start: 2019-11-04

## 2019-11-04 RX ORDER — TAMSULOSIN HYDROCHLORIDE 0.4 MG/1
1 CAPSULE ORAL DAILY
Qty: 90 CAPSULE | Refills: 3 | Status: SHIPPED | OUTPATIENT
Start: 2019-11-04

## 2019-11-04 RX ORDER — MOMETASONE FUROATE 50 UG/1
SPRAY, METERED NASAL
Qty: 3 EACH | Refills: 3 | Status: SHIPPED | OUTPATIENT
Start: 2019-11-04

## 2019-11-04 RX ORDER — HYDROCODONE BITARTRATE AND IBUPROFEN 7.5; 2 MG/1; MG/1
1 TABLET, FILM COATED ORAL EVERY 6 HOURS PRN
Qty: 30 TABLET | Refills: 0 | Status: SHIPPED | OUTPATIENT
Start: 2019-11-04

## 2019-11-04 RX ORDER — AZELASTINE HYDROCHLORIDE 0.5 MG/ML
1 SOLUTION/ DROPS OPHTHALMIC DAILY
Qty: 6 ML | Refills: 2 | Status: CANCELLED | OUTPATIENT
Start: 2019-11-04

## 2019-11-04 RX ORDER — TESTOSTERONE CYPIONATE 200 MG/ML
200 INJECTION, SOLUTION INTRAMUSCULAR
Qty: 10 ML | Refills: 3 | Status: CANCELLED | OUTPATIENT
Start: 2019-11-04 | End: 2020-05-04

## 2019-11-04 RX ORDER — ACYCLOVIR 800 MG/1
800 TABLET ORAL DAILY
Qty: 90 TABLET | Refills: 1 | Status: SHIPPED | OUTPATIENT
Start: 2019-11-04 | End: 2020-10-31

## 2019-11-04 NOTE — LETTER
November 4, 2019      [facility]  [address], [address]  [place] [address]  Phone: [phone]  Fax: [phone]       Patient: Reji Gates   YOB: 1960  Date of Visit: 11/04/2019    To Whom It May Concern:    Sandip Gates  was at [facility] on 11/04/2019. He may return to work/school on Nov. 5th, 2019 with no restrictions. If you have any questions or concerns, or if I can be of further assistance, please do not hesitate to contact me.    Sincerely,        CONTRERAS Stevenson LPN

## 2019-11-05 LAB
CRYPTOC AG CSF QL LA: NEGATIVE
PROT CSF-MCNC: 66 MG/DL (ref 12–60)

## 2019-11-06 LAB — VDRL CSF QL: NORMAL

## 2019-11-07 LAB
CD3+CD4+ CELLS # BLD: 767 CELLS/UL (ref 490–1740)
CD3+CD4+ CELLS NFR BLD: 40 % (ref 30–61)
LYMPHOCYTES # BLD AUTO: 1914 CELLS/UL (ref 850–3900)

## 2019-11-09 NOTE — PROGRESS NOTES
INTERNAL MEDICINE    Patient Active Problem List   Diagnosis    LBP (low back pain)    Adjustment insomnia    HIV disease    Hypogonadism in male    Left optic neuritis    Allergic rhinitis    Right inguinal hernia       CC:   Chief Complaint   Patient presents with    6 month follow up       SUBJECTIVE:  Reji Gates   is a 58 y.o. male  HPI   58y/oWM with HIV disease. He has had unprotected sex in the past month, both oral and anal and is complaining of a mild sore throat and perirectal itching with redness.  Of note, he is RPR + 1:16, which slowly lessened from 1:32  2/23/18.  However, these have been varying considerably, never returning to normal despite being treated.  He has no palmar rash. No neurologic deficits,. But has urinary difficulty, and headaches.      ROS: Review of Systems   Constitutional: Negative for activity change and unexpected weight change.   HENT: Positive for hearing loss. Negative for rhinorrhea and trouble swallowing.    Eyes: Negative for discharge and visual disturbance.   Respiratory: Negative for chest tightness and wheezing.    Cardiovascular: Negative for chest pain and palpitations.   Gastrointestinal: Negative for blood in stool, constipation, diarrhea and vomiting.   Endocrine: Negative for polydipsia and polyuria.   Genitourinary: Positive for difficulty urinating and urgency. Negative for hematuria.   Musculoskeletal: Positive for arthralgias, joint swelling and neck pain.   Neurological: Positive for headaches. Negative for weakness.   Psychiatric/Behavioral: Negative for confusion and dysphoric mood.       Past Medical History:   Diagnosis Date    Anxiety     Arthritis     Depression     Environmental and seasonal allergies     GERD (gastroesophageal reflux disease)     HIV infection     Hyperlipidemia     Hypogonadism male     Low back pain     Prostate disease        Past Surgical History:   Procedure Laterality Date    NOSE SURGERY      LINDA  TOOTH EXTRACTION         Family History   Problem Relation Age of Onset    Hypertension Mother     Arthritis Mother     Cancer Father     COPD Sister     No Known Problems Maternal Grandmother     No Known Problems Maternal Grandfather     No Known Problems Paternal Grandmother     No Known Problems Paternal Grandfather        Social History     Tobacco Use    Smoking status: Former Smoker     Types: Cigarettes    Smokeless tobacco: Never Used   Substance Use Topics    Alcohol use: No     Alcohol/week: 0.0 standard drinks    Drug use: Yes     Types: Marijuana       Social History     Social History Narrative    Not on file       ALLERGIES: Review of patient's allergies indicates:  No Known Allergies    MEDS:   Current Outpatient Medications:     acyclovir (ZOVIRAX) 800 MG Tab, Take 1 tablet (800 mg total) by mouth once daily., Disp: 90 tablet, Rfl: 1    ALPRAZolam (XANAX) 0.5 MG tablet, Take 1 tablet (0.5 mg total) by mouth once daily., Disp: 30 tablet, Rfl: 3    ascorbic acid (VITAMIN C) 1000 MG tablet, Take 1,000 mg by mouth 2 (two) times daily., Disp: , Rfl:     azelastine (OPTIVAR) 0.05 % ophthalmic solution, Place 1 drop into both eyes once daily., Disp: 6 mL, Rfl: 2    clindamycin phosphate 1% (CLINDAGEL) 1 % gel, APPLY BID, Disp: , Rfl: 2    FISH,SAF,FLX,BRG OILS/O3,6,9#2 (FISH-FLAX-BORAGE OIL ORAL), Take 1 capsule by mouth 2 (two) times daily., Disp: , Rfl:     hydrocodone-ibuprofen 7.5-200 mg (VICOPROFEN) 7.5-200 mg per tablet, Take 1 tablet by mouth every 6 (six) hours as needed for Pain., Disp: 30 tablet, Rfl: 0    mometasone (NASONEX) 50 mcg/actuation nasal spray, USE 2 SPRAYS NASALLY ONE TIME DAILY, Disp: 3 each, Rfl: 3    montelukast (SINGULAIR) 10 mg tablet, Take 1 tablet (10 mg total) by mouth every evening., Disp: 90 tablet, Rfl: 0    MULTIVIT-MINERALS/FERROUS FUM (MULTI VITAMIN ORAL), Take by mouth., Disp: , Rfl:     ranitidine (ZANTAC) 150 MG tablet, Take 150 mg by mouth 2  "(two) times daily., Disp: , Rfl:     syringe with needle, safety 3 mL 20 gauge x 1 1/2" Syrg, 1 Device by Misc.(Non-Drug; Combo Route) route every 14 (fourteen) days., Disp: 20 Syringe, Rfl: 3    tamsulosin (FLOMAX) 0.4 mg Cap, Take 1 capsule (0.4 mg total) by mouth Daily., Disp: 90 capsule, Rfl: 3    testosterone cypionate (DEPOTESTOTERONE CYPIONATE) 200 mg/mL injection, Inject 1 mL (200 mg total) into the muscle every 14 (fourteen) days., Disp: 10 mL, Rfl: 3    pxhdbhxq-goxeswri-fdjfir-tenof, STRIBILD, [phone] mg (STRIBILD) [phone] mg per tablet, Take 1 tablet by mouth once daily., Disp: 90 tablet, Rfl: 3    OBJECTIVE:   Vitals:    11/04/19 0851   BP: 136/82   BP Location: Left arm   Patient Position: Sitting   BP Method: Large (Manual)   Pulse: 69   Temp: 98.2 °F (36.8 °C)   TempSrc: Oral   SpO2: 98%   Weight: 80.1 kg (176 lb 8 oz)   Height: 6' (1.829 m)     Body mass index is 23.94 kg/m².    Physical Exam   Constitutional: He is oriented to person, place, and time. He appears well-developed and well-nourished.   HENT:   Head: Normocephalic and atraumatic.   Right Ear: External ear normal.   Left Ear: External ear normal.   Nose: Nose normal.   Mouth/Throat: Oropharynx is clear and moist.   Eyes: Pupils are equal, round, and reactive to light. Conjunctivae and EOM are normal.   Neck: Normal range of motion. Neck supple.   Cardiovascular: Normal rate, regular rhythm and normal heart sounds.   Pulmonary/Chest: Effort normal and breath sounds normal.   Abdominal: Soft. Bowel sounds are normal.   Genitourinary: Rectum normal.       Prostate is not tender.   Genitourinary Comments: Erythema, no ulcerations   Musculoskeletal: Normal range of motion.   Neurological: He is alert and oriented to person, place, and time. He displays normal reflexes. No cranial nerve deficit or sensory deficit. He exhibits normal muscle tone. Coordination normal.   Skin: Skin is warm and dry. No rash noted. "   Psychiatric: He has a normal mood and affect. His behavior is normal.   Nursing note and vitals reviewed.        PERTINENT RESULTS:   CBC:  Recent Labs   Lab Result Units 10/24/19  0929   WBC Thousand/uL 5.5   RBC Million/uL 5.11   Hemoglobin g/dL 15.5   Hematocrit % 45.8   Platelets Thousand/uL 226   Mean Corpuscular Volume fL 89.6   Mean Corpuscular Hemoglobin pg 30.3   Mean Corpuscular Hemoglobin Conc g/dL 33.8     CMP:  Recent Labs   Lab Result Units 10/24/19  0929   Glucose mg/dL 90   Calcium mg/dL 9.3   Albumin g/dL 4.3   Total Protein g/dL 6.6   Sodium mmol/L 139   Potassium mmol/L 4.4   CO2 mmol/L 27   Chloride mmol/L 104   BUN, Bld mg/dL 25   Alkaline Phosphatase U/L 78   ALT U/L 24   AST U/L 31   Total Bilirubin mg/dL 0.5     URINALYSIS:  Recent Labs   Lab Result Units 10/24/19  0929   Color, UA  YELLOW   Specific Gravity, UA  1.020   pH, UA  6.0   Protein, UA  NEGATIVE   Glucose, UA  NEGATIVE   Nitrite, UA  NEGATIVE   Leukocytes, UA  NEGATIVE      LIPIDS:  Recent Labs   Lab Result Units 10/24/19  0929   HDL mg/dL 39*   Cholesterol mg/dL 117   Triglycerides mg/dL 87   LDL Cholesterol mg/dL (calc) 61   Hdl/Cholesterol Ratio (calc) 3.0   Non HDL Chol. (LDL+VLDL) mg/dL (calc) 78             ASSESSMENT:  Problem List Items Addressed This Visit        ID    HIV disease (Chronic)    Relevant Medications    jcifvzyu-gjoxmamb-jltgtj-tenof, STRIBILD, [phone] mg (STRIBILD) [phone] mg per tablet    Other Relevant Orders    Lymphocyte Subset Panel 5 T-[place]/Inducer (Completed)    Glucose, CSF (Completed)    VDRL, CSF (Completed)    Protein, CSF    CSF cell count with differential (Completed)    Cryptococcal antigen, CSF (Completed)       Endocrine    Hypogonadism in male (Chronic)       Other    Allergic rhinitis    Relevant Medications    montelukast (SINGULAIR) 10 mg tablet    mometasone (NASONEX) 50 mcg/actuation nasal spray    Adjustment insomnia (Chronic)    Relevant Medications    ALPRAZolam  (XANAX) 0.5 MG tablet      Other Visit Diagnoses     Lues    -  Primary    Relevant Orders    VDRL, CSF (Completed)    Benign prostatic hyperplasia, unspecified whether lower urinary tract symptoms present        Relevant Medications    tamsulosin (FLOMAX) 0.4 mg Cap    Arthritis        Relevant Medications    hydrocodone-ibuprofen 7.5-200 mg (VICOPROFEN) 7.5-200 mg per tablet    Seasonal allergic rhinitis        Relevant Medications    mometasone (NASONEX) 50 mcg/actuation nasal spray    Optic neuritis, left        Relevant Medications    acyclovir (ZOVIRAX) 800 MG Tab    Need for influenza vaccination        Relevant Orders    Influenza - Quadrivalent (PF) (Completed)    Syphilis in male        Relevant Medications    penicillin G benzathine (BICILLIN LA) injection 2.4 Million Units (Completed)    Other Relevant Orders    Glucose, CSF (Completed)    VDRL, CSF (Completed)    Protein, CSF    CSF cell count with differential (Completed)    Cryptococcal antigen, CSF (Completed)      Need to rule out Neurosyphilis/terciary lues. At the very least this is a late latent lues.    PLAN:   Orders Placed This Encounter    Cryptococcal antigen, CSF    Influenza - Quadrivalent (PF)    Lymphocyte Subset Panel 5 T-[place]/Inducer    Glucose, CSF    VDRL, CSF    Protein, CSF    CSF cell count with differential    CSF Total Protein ([place])    tamsulosin (FLOMAX) 0.4 mg Cap    montelukast (SINGULAIR) 10 mg tablet    hydrocodone-ibuprofen 7.5-200 mg (VICOPROFEN) 7.5-200 mg per tablet    mometasone (NASONEX) 50 mcg/actuation nasal spray    rgqgykfq-bwdcuwjf-umkiip-tenof, STRIBILD, [id number] mg (STRIBILD) [phone] mg per tablet    ALPRAZolam (XANAX) 0.5 MG tablet    acyclovir (ZOVIRAX) 800 MG Tab    penicillin G benzathine (BICILLIN LA) injection 2.4 Million Units     Orders Placed This Encounter   Procedures    Cryptococcal antigen, CSF    Influenza - Quadrivalent (PF)    Lymphocyte Subset  Panel 5 T-[place]/Inducer     Standing Status:   Future     Number of Occurrences:   1     Standing Expiration Date:   1/2/2021    Glucose, CSF           Order Specific Question:   Specimen Tube Number     Answer:   Tube 1 [1]    VDRL, CSF          Protein, CSF           Order Specific Question:   Specimen Tube Number     Answer:   Tube 1 [1]    CSF cell count with differential     Order Specific Question:   Specimen Tube Number     Answer:   Tube 2 [2]    CSF Total Protein ([place])       Follow-up with 2weeks in me.   Dr. Jamar Mike  Internal Medicine

## 2019-11-11 ENCOUNTER — CLINICAL SUPPORT (OUTPATIENT)
Dept: INTERNAL MEDICINE | Facility: CLINIC | Age: 59
End: 2019-11-11
Payer: COMMERCIAL

## 2019-11-11 DIAGNOSIS — A53.9: ICD-10-CM

## 2019-11-11 PROCEDURE — 96372 THER/PROPH/DIAG INJ SC/IM: CPT | Mod: S$GLB,,, | Performed by: INTERNAL MEDICINE

## 2019-11-11 PROCEDURE — 96372 PR INJECTION,THERAP/PROPH/DIAG2ST, IM OR SUBCUT: ICD-10-PCS | Mod: S$GLB,,, | Performed by: INTERNAL MEDICINE

## 2019-11-11 NOTE — PROGRESS NOTES
Pt received Bicillin 2.4 mill units IM. No sxs of distress noted. No adverse reactions noted. Pt received injection to the left hip. Pt will return next Monday 11/18 to receive last injection. Pt tolerated injection well.

## 2019-11-18 ENCOUNTER — OFFICE VISIT (OUTPATIENT)
Dept: INTERNAL MEDICINE | Facility: CLINIC | Age: 59
End: 2019-11-18
Payer: COMMERCIAL

## 2019-11-18 VITALS
BODY MASS INDEX: 23.86 KG/M2 | HEART RATE: 65 BPM | SYSTOLIC BLOOD PRESSURE: 130 MMHG | OXYGEN SATURATION: 97 % | TEMPERATURE: 98 F | WEIGHT: 176.13 LBS | RESPIRATION RATE: 18 BRPM | HEIGHT: 72 IN | DIASTOLIC BLOOD PRESSURE: 80 MMHG

## 2019-11-18 DIAGNOSIS — A53.9: Primary | ICD-10-CM

## 2019-11-18 DIAGNOSIS — H46.9 LEFT OPTIC NEURITIS: ICD-10-CM

## 2019-11-18 DIAGNOSIS — B20 HIV DISEASE: Chronic | ICD-10-CM

## 2019-11-18 PROCEDURE — 96372 PR INJECTION,THERAP/PROPH/DIAG2ST, IM OR SUBCUT: ICD-10-PCS | Mod: S$GLB,,, | Performed by: INTERNAL MEDICINE

## 2019-11-18 PROCEDURE — 99999 PR PBB SHADOW E&M-EST. PATIENT-LVL III: ICD-10-PCS | Mod: PBBFAC,,, | Performed by: INTERNAL MEDICINE

## 2019-11-18 PROCEDURE — 96372 THER/PROPH/DIAG INJ SC/IM: CPT | Mod: S$GLB,,, | Performed by: INTERNAL MEDICINE

## 2019-11-18 PROCEDURE — 99999 PR PBB SHADOW E&M-EST. PATIENT-LVL III: CPT | Mod: PBBFAC,,, | Performed by: INTERNAL MEDICINE

## 2019-11-18 PROCEDURE — 99214 PR OFFICE/OUTPT VISIT, EST, LEVL IV, 30-39 MIN: ICD-10-PCS | Mod: 25,S$GLB,, | Performed by: INTERNAL MEDICINE

## 2019-11-18 PROCEDURE — 99214 OFFICE O/P EST MOD 30 MIN: CPT | Mod: 25,S$GLB,, | Performed by: INTERNAL MEDICINE

## 2019-11-19 NOTE — PROGRESS NOTES
INTERNAL MEDICINE    Patient Active Problem List   Diagnosis    LBP (low back pain)    Adjustment insomnia    HIV disease    Hypogonadism in male    Left optic neuritis    Allergic rhinitis    Right inguinal hernia    Lues       CC:   Chief Complaint   Patient presents with    Follow-up     1 week follow up       SUBJECTIVE:  Reji Gates   is a 58 y.o. male  HPI   His LP was negative for Neuro Lues.  He is here for his last set of Pcn injections.  His only complaint seems to be a scratchy throat. No fever,chiolls,or myalgias.  Nothing new.    I reviewed his entire titer history of RPR with hm. He is down to 1:8 finally.    ROS: Review of Systems   Constitutional: Negative for activity change and unexpected weight change.   HENT: Negative for hearing loss, rhinorrhea and trouble swallowing.    Eyes: Negative for discharge and visual disturbance.   Respiratory: Negative for chest tightness and wheezing.    Cardiovascular: Negative for chest pain and palpitations.   Gastrointestinal: Negative for blood in stool, constipation, diarrhea and vomiting.   Endocrine: Positive for polyuria. Negative for polydipsia.   Genitourinary: Positive for difficulty urinating and urgency. Negative for hematuria.   Musculoskeletal: Positive for arthralgias and joint swelling. Negative for neck pain.   Skin: Negative for color change, pallor, rash and wound.   Neurological: Negative for weakness and headaches.   Psychiatric/Behavioral: Negative for confusion and dysphoric mood.       Past Medical History:   Diagnosis Date    Anxiety     Arthritis     Depression     Environmental and seasonal allergies     GERD (gastroesophageal reflux disease)     HIV infection     Hyperlipidemia     Hypogonadism male     Low back pain     Prostate disease        Past Surgical History:   Procedure Laterality Date    NOSE SURGERY      WISDOM TOOTH EXTRACTION         Family History   Problem Relation Age of Onset     Hypertension Mother     Arthritis Mother     Cancer Father     COPD Sister     No Known Problems Maternal Grandmother     No Known Problems Maternal Grandfather     No Known Problems Paternal Grandmother     No Known Problems Paternal Grandfather        Social History     Tobacco Use    Smoking status: Former Smoker     Types: Cigarettes    Smokeless tobacco: Never Used   Substance Use Topics    Alcohol use: No     Alcohol/week: 0.0 standard drinks    Drug use: Yes     Types: Marijuana       Social History     Social History Narrative    Not on file       ALLERGIES: Review of patient's allergies indicates:  No Known Allergies    MEDS:   Current Outpatient Medications:     acyclovir (ZOVIRAX) 800 MG Tab, Take 1 tablet (800 mg total) by mouth once daily., Disp: 90 tablet, Rfl: 1    ALPRAZolam (XANAX) 0.5 MG tablet, Take 1 tablet (0.5 mg total) by mouth once daily., Disp: 30 tablet, Rfl: 3    ascorbic acid (VITAMIN C) 1000 MG tablet, Take 1,000 mg by mouth 2 (two) times daily., Disp: , Rfl:     azelastine (OPTIVAR) 0.05 % ophthalmic solution, Place 1 drop into both eyes once daily., Disp: 6 mL, Rfl: 2    clindamycin phosphate 1% (CLINDAGEL) 1 % gel, APPLY BID, Disp: , Rfl: 2    zdxadhyy-elwzwdho-lwnzfv-tenof, STRIBILD, [phone] mg (STRIBILD) [phone] mg per tablet, Take 1 tablet by mouth once daily., Disp: 90 tablet, Rfl: 3    FISH,SAF,FLX,BRG OILS/O3,6,9#2 (FISH-FLAX-BORAGE OIL ORAL), Take 1 capsule by mouth 2 (two) times daily., Disp: , Rfl:     hydrocodone-ibuprofen 7.5-200 mg (VICOPROFEN) 7.5-200 mg per tablet, Take 1 tablet by mouth every 6 (six) hours as needed for Pain., Disp: 30 tablet, Rfl: 0    mometasone (NASONEX) 50 mcg/actuation nasal spray, USE 2 SPRAYS NASALLY ONE TIME DAILY, Disp: 3 each, Rfl: 3    montelukast (SINGULAIR) 10 mg tablet, Take 1 tablet (10 mg total) by mouth every evening., Disp: 90 tablet, Rfl: 0    MULTIVIT-MINERALS/FERROUS FUM (MULTI VITAMIN ORAL),  "Take by mouth., Disp: , Rfl:     ranitidine (ZANTAC) 150 MG tablet, Take 150 mg by mouth 2 (two) times daily., Disp: , Rfl:     syringe with needle, safety 3 mL 20 gauge x 1 1/2" Syrg, 1 Device by Misc.(Non-Drug; Combo Route) route every 14 (fourteen) days., Disp: 20 Syringe, Rfl: 3    tamsulosin (FLOMAX) 0.4 mg Cap, Take 1 capsule (0.4 mg total) by mouth Daily., Disp: 90 capsule, Rfl: 3    testosterone cypionate (DEPOTESTOTERONE CYPIONATE) 200 mg/mL injection, Inject 1 mL (200 mg total) into the muscle every 14 (fourteen) days., Disp: 10 mL, Rfl: 3    OBJECTIVE:   Vitals:    11/18/19 0855   BP: 130/80   BP Location: Left arm   Patient Position: Sitting   BP Method: X-Large (Manual)   Pulse: 65   Resp: 18   Temp: 98.1 °F (36.7 °C)   TempSrc: Oral   SpO2: 97%   Weight: 79.9 kg (176 lb 1.6 oz)   Height: 6' (1.829 m)     Body mass index is 23.88 kg/m².    Physical Exam   Constitutional: He is oriented to person, place, and time. He appears well-developed and well-nourished.   HENT:   Head: Normocephalic and atraumatic.   Right Ear: External ear normal.   Left Ear: External ear normal.   Nose: Nose normal.   Mouth/Throat: Oropharynx is clear and moist.   Eyes: Pupils are equal, round, and reactive to light. Conjunctivae and EOM are normal.   Neck: Normal range of motion. Neck supple.   Cardiovascular: Normal rate, regular rhythm and normal heart sounds.   Pulmonary/Chest: Effort normal and breath sounds normal.   Abdominal: Soft.   Musculoskeletal: Normal range of motion.   Neurological: He is alert and oriented to person, place, and time.   Skin: Skin is warm and dry.   Psychiatric: He has a normal mood and affect. His behavior is normal. Judgment and thought content normal.   Nursing note and vitals reviewed.        PERTINENT RESULTS:   CBC:  Recent Labs   Lab Result Units 10/24/19  0929   WBC Thousand/uL 5.5   RBC Million/uL 5.11   Hemoglobin g/dL 15.5   Hematocrit % 45.8   Platelets Thousand/uL 226   Mean " Corpuscular Volume fL 89.6   Mean Corpuscular Hemoglobin pg 30.3   Mean Corpuscular Hemoglobin Conc g/dL 33.8     CMP:  Recent Labs   Lab Result Units 10/24/19  0929   Glucose mg/dL 90   Calcium mg/dL 9.3   Albumin g/dL 4.3   Total Protein g/dL 6.6   Sodium mmol/L 139   Potassium mmol/L 4.4   CO2 mmol/L 27   Chloride mmol/L 104   BUN, Bld mg/dL 25   Alkaline Phosphatase U/L 78   ALT U/L 24   AST U/L 31   Total Bilirubin mg/dL 0.5     URINALYSIS:  Recent Labs   Lab Result Units 10/24/19  0929   Color, UA  YELLOW   Specific Gravity, UA  1.020   pH, UA  6.0   Protein, UA  NEGATIVE   Glucose, UA  NEGATIVE   Nitrite, UA  NEGATIVE   Leukocytes, UA  NEGATIVE      LIPIDS:  Recent Labs   Lab Result Units 10/24/19  0929   HDL mg/dL 39*   Cholesterol mg/dL 117   Triglycerides mg/dL 87   LDL Cholesterol mg/dL (calc) 61   Hdl/Cholesterol Ratio (calc) 3.0   Non HDL Chol. (LDL+VLDL) mg/dL (calc) 78             ASSESSMENT:  Problem List Items Addressed This Visit        ID    Luearnestine - Primary    Relevant Medications    penicillin G benzathine (BICILLIN LA) injection 2.4 Million Units (Completed)          PLAN:   Orders Placed This Encounter    penicillin G benzathine (BICILLIN LA) injection 2.4 Million Units     No orders of the defined types were placed in this encounter.  Same meds otherwise.  Safe sex.    Follow-up with  [facility] in 6 months..   Dr. Jamar Mike  Internal Medicine

## 2020-03-19 ENCOUNTER — ANESTHESIA EVENT (OUTPATIENT)
Dept: SURGERY | Facility: OTHER | Age: 60
End: 2020-03-19
Payer: COMMERCIAL

## 2020-03-19 NOTE — PRE ADMISSION SCREENING
Pre admit phone call completed.    Instructions given to patient about NPO status as follows:     The evening before surgery do not eat anything after 9 p.m. ( this includes hard candy, chewing gum and mints).  You may only have GATORADE, POWERADE AND WATER from 9 p.m. until you leave your home. DO NOT  DRINK ANY LIQUIDS ON THE WAY TO THE HOSPITAL.      Patient was also instructed on the below information:    Park in the Parking lot behind the hospital or in the [facility] across the street from the parking lot.  Parking is complimentary.  If you will be discharged the same day as your procedure, please arrange for a responsible adult to drive you home or  to accompany you if traveling by taxi.  YOU WILL NOT BE PERMITTED TO DRIVE OR TO LEAVE THE HOSPITAL ALONE AFTER SURGERY.  It is strongly recommended that you arrange for someone to remain with you for the first 24 hrs following your surgery.    Patient verbalized understanding of above instructions.

## 2020-03-20 ENCOUNTER — ANESTHESIA (OUTPATIENT)
Dept: SURGERY | Facility: OTHER | Age: 60
End: 2020-03-20
Payer: COMMERCIAL

## 2020-03-20 ENCOUNTER — HOSPITAL ENCOUNTER (OUTPATIENT)
Facility: OTHER | Age: 60
Discharge: HOME | End: 2020-03-20
Attending: SPECIALIST | Admitting: SPECIALIST
Payer: COMMERCIAL

## 2020-03-20 VITALS
WEIGHT: 172 LBS | SYSTOLIC BLOOD PRESSURE: 137 MMHG | BODY MASS INDEX: 24.62 KG/M2 | OXYGEN SATURATION: 98 % | HEIGHT: 70 IN | HEART RATE: 72 BPM | DIASTOLIC BLOOD PRESSURE: 72 MMHG | RESPIRATION RATE: 18 BRPM | TEMPERATURE: 98 F

## 2020-03-20 DIAGNOSIS — K40.90 LEFT INGUINAL HERNIA: ICD-10-CM

## 2020-03-20 PROCEDURE — 25000003 PHARM REV CODE 250: Performed by: SPECIALIST

## 2020-03-20 PROCEDURE — 27201423 OPTIME MED/SURG SUP & DEVICES STERILE SUPPLY: Performed by: SPECIALIST

## 2020-03-20 PROCEDURE — 71000016 HC POSTOP RECOV ADDL HR: Performed by: SPECIALIST

## 2020-03-20 PROCEDURE — 63600175 PHARM REV CODE 636 W HCPCS: Performed by: NURSE ANESTHETIST, CERTIFIED REGISTERED

## 2020-03-20 PROCEDURE — 36000706: Performed by: SPECIALIST

## 2020-03-20 PROCEDURE — 63600175 PHARM REV CODE 636 W HCPCS: Performed by: SPECIALIST

## 2020-03-20 PROCEDURE — 25000003 PHARM REV CODE 250: Performed by: NURSE ANESTHETIST, CERTIFIED REGISTERED

## 2020-03-20 PROCEDURE — 63600175 PHARM REV CODE 636 W HCPCS: Performed by: ANESTHESIOLOGY

## 2020-03-20 PROCEDURE — 37000008 HC ANESTHESIA 1ST 15 MINUTES: Performed by: SPECIALIST

## 2020-03-20 PROCEDURE — 71000033 HC RECOVERY, INTIAL HOUR: Performed by: SPECIALIST

## 2020-03-20 PROCEDURE — C9290 INJ, BUPIVACAINE LIPOSOME: HCPCS | Performed by: SPECIALIST

## 2020-03-20 PROCEDURE — 25000003 PHARM REV CODE 250: Performed by: ANESTHESIOLOGY

## 2020-03-20 PROCEDURE — 71000015 HC POSTOP RECOV 1ST HR: Performed by: SPECIALIST

## 2020-03-20 PROCEDURE — 37000009 HC ANESTHESIA EA ADD 15 MINS: Performed by: SPECIALIST

## 2020-03-20 PROCEDURE — 36000707: Performed by: SPECIALIST

## 2020-03-20 PROCEDURE — C1781 MESH (IMPLANTABLE): HCPCS | Performed by: SPECIALIST

## 2020-03-20 DEVICE — MESH SOFT PROLENE WVN 3X6: Type: IMPLANTABLE DEVICE | Site: GROIN | Status: FUNCTIONAL

## 2020-03-20 RX ORDER — ONDANSETRON 8 MG/1
8 TABLET, ORALLY DISINTEGRATING ORAL EVERY 8 HOURS PRN
Status: DISCONTINUED | OUTPATIENT
Start: 2020-03-20 | End: 2020-03-20 | Stop reason: HOSPADM

## 2020-03-20 RX ORDER — ONDANSETRON 2 MG/ML
4 INJECTION INTRAMUSCULAR; INTRAVENOUS DAILY PRN
Status: DISCONTINUED | OUTPATIENT
Start: 2020-03-20 | End: 2020-03-20 | Stop reason: HOSPADM

## 2020-03-20 RX ORDER — CEFAZOLIN SODIUM 1 G/3ML
2 INJECTION, POWDER, FOR SOLUTION INTRAMUSCULAR; INTRAVENOUS
Status: COMPLETED | OUTPATIENT
Start: 2020-03-20 | End: 2020-03-20

## 2020-03-20 RX ORDER — LIDOCAINE HYDROCHLORIDE 10 MG/ML
1 INJECTION, SOLUTION EPIDURAL; INFILTRATION; INTRACAUDAL; PERINEURAL ONCE
Status: DISCONTINUED | OUTPATIENT
Start: 2020-03-20 | End: 2020-03-20 | Stop reason: HOSPADM

## 2020-03-20 RX ORDER — DIPHENHYDRAMINE HYDROCHLORIDE 50 MG/ML
25 INJECTION INTRAMUSCULAR; INTRAVENOUS EVERY 6 HOURS PRN
Status: DISCONTINUED | OUTPATIENT
Start: 2020-03-20 | End: 2020-03-20 | Stop reason: HOSPADM

## 2020-03-20 RX ORDER — OXYCODONE HYDROCHLORIDE 5 MG/1
5 TABLET ORAL EVERY 4 HOURS PRN
Status: DISCONTINUED | OUTPATIENT
Start: 2020-03-20 | End: 2020-03-20 | Stop reason: HOSPADM

## 2020-03-20 RX ORDER — SODIUM CHLORIDE, SODIUM LACTATE, POTASSIUM CHLORIDE, CALCIUM CHLORIDE 600; 310; 30; 20 MG/100ML; MG/100ML; MG/100ML; MG/100ML
INJECTION, SOLUTION INTRAVENOUS CONTINUOUS
Status: DISCONTINUED | OUTPATIENT
Start: 2020-03-20 | End: 2020-03-20 | Stop reason: HOSPADM

## 2020-03-20 RX ORDER — OXYCODONE HYDROCHLORIDE 5 MG/1
5 TABLET ORAL
Status: DISCONTINUED | OUTPATIENT
Start: 2020-03-20 | End: 2020-03-20 | Stop reason: HOSPADM

## 2020-03-20 RX ORDER — OXYCODONE HYDROCHLORIDE 5 MG/1
10 TABLET ORAL EVERY 4 HOURS PRN
Status: DISCONTINUED | OUTPATIENT
Start: 2020-03-20 | End: 2020-03-20 | Stop reason: HOSPADM

## 2020-03-20 RX ORDER — HYDROMORPHONE HYDROCHLORIDE 2 MG/ML
0.4 INJECTION, SOLUTION INTRAMUSCULAR; INTRAVENOUS; SUBCUTANEOUS EVERY 5 MIN PRN
Status: DISCONTINUED | OUTPATIENT
Start: 2020-03-20 | End: 2020-03-20 | Stop reason: HOSPADM

## 2020-03-20 RX ORDER — SODIUM CHLORIDE 9 MG/ML
INJECTION, SOLUTION INTRAVENOUS CONTINUOUS
Status: DISCONTINUED | OUTPATIENT
Start: 2020-03-20 | End: 2020-03-20 | Stop reason: HOSPADM

## 2020-03-20 RX ORDER — MIDAZOLAM HYDROCHLORIDE 1 MG/ML
INJECTION INTRAMUSCULAR; INTRAVENOUS
Status: DISCONTINUED | OUTPATIENT
Start: 2020-03-20 | End: 2020-03-20

## 2020-03-20 RX ORDER — OXYCODONE AND ACETAMINOPHEN 7.5; 325 MG/1; MG/1
1 TABLET ORAL EVERY 6 HOURS PRN
Qty: 28 TABLET | Refills: 0 | Status: SHIPPED | OUTPATIENT
Start: 2020-03-20

## 2020-03-20 RX ORDER — ACETAMINOPHEN 325 MG/1
650 TABLET ORAL EVERY 4 HOURS PRN
Status: DISCONTINUED | OUTPATIENT
Start: 2020-03-20 | End: 2020-03-20 | Stop reason: HOSPADM

## 2020-03-20 RX ORDER — SODIUM CHLORIDE 0.9 % (FLUSH) 0.9 %
3 SYRINGE (ML) INJECTION
Status: DISCONTINUED | OUTPATIENT
Start: 2020-03-20 | End: 2020-03-20 | Stop reason: HOSPADM

## 2020-03-20 RX ORDER — FENTANYL CITRATE 50 UG/ML
INJECTION, SOLUTION INTRAMUSCULAR; INTRAVENOUS
Status: DISCONTINUED | OUTPATIENT
Start: 2020-03-20 | End: 2020-03-20

## 2020-03-20 RX ORDER — PROPOFOL 10 MG/ML
VIAL (ML) INTRAVENOUS
Status: DISCONTINUED | OUTPATIENT
Start: 2020-03-20 | End: 2020-03-20

## 2020-03-20 RX ORDER — PREGABALIN 75 MG/1
75 CAPSULE ORAL ONCE
Status: COMPLETED | OUTPATIENT
Start: 2020-03-20 | End: 2020-03-20

## 2020-03-20 RX ORDER — LIDOCAINE HYDROCHLORIDE 20 MG/ML
INJECTION INTRAVENOUS
Status: DISCONTINUED | OUTPATIENT
Start: 2020-03-20 | End: 2020-03-20

## 2020-03-20 RX ORDER — ONDANSETRON 2 MG/ML
INJECTION INTRAMUSCULAR; INTRAVENOUS
Status: DISCONTINUED | OUTPATIENT
Start: 2020-03-20 | End: 2020-03-20

## 2020-03-20 RX ORDER — PHENYLEPHRINE HYDROCHLORIDE 10 MG/ML
INJECTION INTRAVENOUS
Status: DISCONTINUED | OUTPATIENT
Start: 2020-03-20 | End: 2020-03-20

## 2020-03-20 RX ORDER — GLYCOPYRROLATE 0.2 MG/ML
INJECTION INTRAMUSCULAR; INTRAVENOUS
Status: DISCONTINUED | OUTPATIENT
Start: 2020-03-20 | End: 2020-03-20

## 2020-03-20 RX ORDER — LIDOCAINE HYDROCHLORIDE 10 MG/ML
0.5 INJECTION, SOLUTION EPIDURAL; INFILTRATION; INTRACAUDAL; PERINEURAL ONCE
Status: DISCONTINUED | OUTPATIENT
Start: 2020-03-20 | End: 2020-03-20 | Stop reason: HOSPADM

## 2020-03-20 RX ORDER — ROCURONIUM BROMIDE 10 MG/ML
INJECTION, SOLUTION INTRAVENOUS
Status: DISCONTINUED | OUTPATIENT
Start: 2020-03-20 | End: 2020-03-20

## 2020-03-20 RX ORDER — BUPIVACAINE HCL/EPINEPHRINE 0.25-.0005
VIAL (ML) INJECTION
Status: DISCONTINUED | OUTPATIENT
Start: 2020-03-20 | End: 2020-03-20 | Stop reason: HOSPADM

## 2020-03-20 RX ORDER — DEXAMETHASONE SODIUM PHOSPHATE 4 MG/ML
INJECTION, SOLUTION INTRA-ARTICULAR; INTRALESIONAL; INTRAMUSCULAR; INTRAVENOUS; SOFT TISSUE
Status: DISCONTINUED | OUTPATIENT
Start: 2020-03-20 | End: 2020-03-20

## 2020-03-20 RX ORDER — MEPERIDINE HYDROCHLORIDE 25 MG/ML
12.5 INJECTION INTRAMUSCULAR; INTRAVENOUS; SUBCUTANEOUS ONCE AS NEEDED
Status: DISCONTINUED | OUTPATIENT
Start: 2020-03-20 | End: 2020-03-20 | Stop reason: HOSPADM

## 2020-03-20 RX ADMIN — SODIUM CHLORIDE, SODIUM LACTATE, POTASSIUM CHLORIDE, AND CALCIUM CHLORIDE: 600; 310; 30; 20 INJECTION, SOLUTION INTRAVENOUS at 06:03

## 2020-03-20 RX ADMIN — FENTANYL CITRATE 50 MCG: 50 INJECTION, SOLUTION INTRAMUSCULAR; INTRAVENOUS at 08:03

## 2020-03-20 RX ADMIN — MIDAZOLAM HYDROCHLORIDE 2 MG: 1 INJECTION, SOLUTION INTRAMUSCULAR; INTRAVENOUS at 07:03

## 2020-03-20 RX ADMIN — CARBOXYMETHYLCELLULOSE SODIUM 2 DROP: 2.5 SOLUTION/ DROPS OPHTHALMIC at 08:03

## 2020-03-20 RX ADMIN — CEFAZOLIN 2 G: 330 INJECTION, POWDER, FOR SOLUTION INTRAMUSCULAR; INTRAVENOUS at 08:03

## 2020-03-20 RX ADMIN — PHENYLEPHRINE HYDROCHLORIDE 100 MCG: 10 INJECTION INTRAVENOUS at 08:03

## 2020-03-20 RX ADMIN — DEXAMETHASONE SODIUM PHOSPHATE 4 MG: 4 INJECTION, SOLUTION INTRAMUSCULAR; INTRAVENOUS at 08:03

## 2020-03-20 RX ADMIN — ONDANSETRON 4 MG: 2 INJECTION INTRAMUSCULAR; INTRAVENOUS at 08:03

## 2020-03-20 RX ADMIN — PROPOFOL 150 MG: 10 INJECTION, EMULSION INTRAVENOUS at 08:03

## 2020-03-20 RX ADMIN — HYDROMORPHONE HYDROCHLORIDE 0.4 MG: 2 INJECTION, SOLUTION INTRAMUSCULAR; INTRAVENOUS; SUBCUTANEOUS at 09:03

## 2020-03-20 RX ADMIN — PREGABALIN 75 MG: 75 CAPSULE ORAL at 05:03

## 2020-03-20 RX ADMIN — LIDOCAINE HYDROCHLORIDE 75 MG: 20 INJECTION, SOLUTION INTRAVENOUS at 08:03

## 2020-03-20 RX ADMIN — GLYCOPYRROLATE 0.2 MG: 0.2 INJECTION, SOLUTION INTRAMUSCULAR; INTRAVENOUS at 08:03

## 2020-03-20 RX ADMIN — ROCURONIUM BROMIDE 35 MG: 10 INJECTION, SOLUTION INTRAVENOUS at 08:03

## 2020-03-20 RX ADMIN — PROPOFOL 50 MG: 10 INJECTION, EMULSION INTRAVENOUS at 08:03

## 2020-03-20 NOTE — OP NOTE
[facility]  Operative Note    SUMMARY     Surgery Date: 3/20/2020     Surgeon(s) and Role:     * Luis Miguel Artis Jr., MD - Primary    Assisting Surgeon: None    Pre-op Diagnosis:  Bilateral inguinal hernia without obstruction or gangrene, recurrence not specified [K40.20]    Post-op Diagnosis:  Post-Op Diagnosis Codes:     * Bilateral inguinal hernia without obstruction or gangrene, recurrence not specified [K40.20]    Procedure(s) (LRB):  REPAIR, HERNIA, INGUINAL, WITHOUT HISTORY OF PRIOR REPAIR, AGE 3 YEARS OR OLDER (Left)    Anesthesia: General    Description of Procedure:  The patient brought the operating room placed supine position, the groin and abdomen prepped draped in sterile fashion.  Transverse incision made over the inguinal canal, using sharp dissection was carried down through the subcutaneous tissues and the fascia the external oblique opened along the by us of the fibers.  The spermatic cord identified and isolated.  There was a sac present along the anteromedial aspect of the cord as well as large lipoma.  Using blunt sharp dissection the sac was freed from surrounding structures and opened.  The contents reduced and the sac closed with interrupted 3 0 Vicryl sutures.  The stump of the sac seen to return into the peritoneal cavity.  Palpation through the floor of the inguinal canal revealed no evidence of femoral hernia.  Floor of the inguinal canal was then reconstructed using soft Prolene mesh which was sutured along the ileal pubic tract laterally and the conjoined tendon medially.  0 Ethibond suture was used to secure the mesh.  The mesh was key holed around the spermatic cord.  After reconstruction the floor of the inguinal canal the spermatic cord was returned to its normal anatomic position and the external oblique closed with interrupted 3 0 Vicryl.  The subcutaneous tissue closed with interrupted 3 0 Vicryl the skin with running 4 Monocryl.  The wounds and sterilely  dressed.  The patient tolerated procedure well left the operating room in good condition.  At the end of procedure all sponge lap and instrument counts were correct.    Estimated Blood Loss:  Minimal         Specimens:   Specimen (12h ago, onward)    None              SUMMARY     Admit Date:     Discharge Date and Time:  03/20/2020 9:32 AM    Hospital Course (synopsis of major diagnoses, care, treatment, and services provided during the course of the hospital stay):  Benign     Final Diagnosis: Post-Op Diagnosis Codes:     * Bilateral inguinal hernia without obstruction or gangrene, recurrence not specified [K40.20]    Disposition: Home or Self Care    Follow Up/Patient Instructions:     Medications:  Reconciled Home Medications:      Medication List      START taking these medications    oxyCODONE-acetaminophen 7.5-325 mg per tablet  Commonly known as:  PERCOCET  Take 1 tablet by mouth every 6 (six) hours as needed.        CONTINUE taking these medications    acyclovir 800 MG Tab  Commonly known as:  ZOVIRAX  Take 1 tablet (800 mg total) by mouth once daily.     ALEVE ORAL  Take by mouth as needed.     ALPRAZolam 0.5 MG tablet  Commonly known as:  XANAX  Take 1 tablet (0.5 mg total) by mouth once daily.     clindamycin phosphate 1% 1 % gel  Commonly known as:  CLINDAGEL  APPLY BID     udjmwpbk-ikgqcnqm-gnucda-tenof (STRIBILD) [phone] mg [phone] mg per tablet  Commonly known as:  STRIBILD  Take 1 tablet by mouth once daily.     FISH-FLAX-BORAGE OIL ORAL  Take 1 capsule by mouth 2 (two) times daily.     hydrocodone-ibuprofen 7.5-200 mg 7.5-200 mg per tablet  Commonly known as:  VICOPROFEN  Take 1 tablet by mouth every 6 (six) hours as needed for Pain.     mometasone 50 mcg/actuation nasal spray  Commonly known as:  NASONEX  USE 2 SPRAYS NASALLY ONE TIME DAILY     montelukast 10 mg tablet  Commonly known as:  SINGULAIR  Take 1 tablet (10 mg total) by mouth every evening.     MULTI VITAMIN ORAL  Take by  "mouth.     ranitidine 150 MG tablet  Commonly known as:  ZANTAC  Take 150 mg by mouth 2 (two) times daily.     syringe with needle, safety 3 mL 20 gauge x 1 1/2" Syrg  1 Device by Misc.(Non-Drug; Combo Route) route every 14 (fourteen) days.     tamsulosin 0.4 mg Cap  Commonly known as:  FLOMAX  Take 1 capsule (0.4 mg total) by mouth Daily.     VITAMIN C 1000 MG tablet  Generic drug:  ascorbic acid (vitamin C)  Take 1,000 mg by mouth 2 (two) times daily.          Discharge Procedure Orders   Diet general     Call MD for:  persistent nausea and vomiting     Call MD for:  severe uncontrolled pain     Call MD for:  redness, tenderness, or signs of infection (pain, swelling, redness, odor or green/yellow discharge around incision site)     Ice to affected area     Lifting restrictions   Order Comments: 10 Lb's     Wound care routine (specify)   Order Comments: Wound care routine: leave prineo intact  May shower     Follow-up Information     Follow up In 10 days.               "

## 2020-03-20 NOTE — H&P
"History & Physical     SUBJECTIVE:      History of Present Illness:  Patient is a 59 y.o. male with left groin pian and reducible left inguinal hernia .      Review of patient's allergies indicates:  No Known Allergies     Current Medications          Current Outpatient Medications   Medication Sig Dispense Refill    acyclovir (ZOVIRAX) 800 MG Tab Take 1 tablet (800 mg total) by mouth once daily. 90 tablet 1    ALPRAZolam (XANAX) 0.5 MG tablet Take 1 tablet (0.5 mg total) by mouth once daily. 30 tablet 3    ascorbic acid (VITAMIN C) 1000 MG tablet Take 1,000 mg by mouth 2 (two) times daily.        azelastine (OPTIVAR) 0.05 % ophthalmic solution Place 1 drop into both eyes once daily. 6 mL 2    clindamycin phosphate 1% (CLINDAGEL) 1 % gel APPLY BID   2    zpjpnnzh-uenhnfpl-qwairu-tenof, STRIBILD, [phone] mg (STRIBILD) [phone] mg per tablet Take 1 tablet by mouth once daily. 90 tablet 3    FISH,SAF,FLX,BRG OILS/O3,6,9#2 (FISH-FLAX-BORAGE OIL ORAL) Take 1 capsule by mouth 2 (two) times daily.        hydrocodone-ibuprofen 7.5-200 mg (VICOPROFEN) 7.5-200 mg per tablet Take 1 tablet by mouth every 6 (six) hours as needed for Pain. 30 tablet 0    mometasone (NASONEX) 50 mcg/actuation nasal spray USE 2 SPRAYS NASALLY ONE TIME DAILY 3 each 3    montelukast (SINGULAIR) 10 mg tablet Take 1 tablet (10 mg total) by mouth every evening. 90 tablet 0    MULTIVIT-MINERALS/FERROUS FUM (MULTI VITAMIN ORAL) Take by mouth.        ranitidine (ZANTAC) 150 MG tablet Take 150 mg by mouth 2 (two) times daily.        syringe with needle, safety 3 mL 20 gauge x 1 1/2" Syrg 1 Device by Misc.(Non-Drug; Combo Route) route every 14 (fourteen) days. 20 Syringe 3    tamsulosin (FLOMAX) 0.4 mg Cap Take 1 capsule (0.4 mg total) by mouth Daily. 90 capsule 3    testosterone cypionate (DEPOTESTOTERONE CYPIONATE) 200 mg/mL injection Inject 1 mL (200 mg total) into the muscle every 14 (fourteen) days. 10 mL 3      No current " facility-administered medications for this visit.                  Past Medical History:   Diagnosis Date    Anxiety      Arthritis      Depression      Environmental and seasonal allergies      GERD (gastroesophageal reflux disease)      HIV infection      Hyperlipidemia      Hypogonadism male      Low back pain      Prostate disease              Past Surgical History:   Procedure Laterality Date    NOSE SURGERY        WISDOM TOOTH EXTRACTION                Family History   Problem Relation Age of Onset    Hypertension Mother      Arthritis Mother      Cancer Father      COPD Sister      No Known Problems Maternal Grandmother      No Known Problems Maternal Grandfather      No Known Problems Paternal Grandmother      No Known Problems Paternal Grandfather        Social History            Tobacco Use    Smoking status: Former Smoker       Types: Cigarettes    Smokeless tobacco: Never Used   Substance Use Topics    Alcohol use: No       Alcohol/week: 0.0 standard drinks    Drug use: Yes       Types: Marijuana         Review of Systems:  Review of Systems   HENT: Negative.    Respiratory: Negative.    Cardiovascular: Negative.    Gastrointestinal: Negative.    Genitourinary: Negative.    Musculoskeletal: Negative.    Skin: Negative.          OBJECTIVE:      Vital Signs (Most Recent)  Pulse: 82 (02/17/20 0931)  BP: 129/81 (02/17/20 0931)  6' (1.829 m)  74.8 kg (165 lb)      Physical Exam:  Physical Exam   Constitutional: He is oriented to person, place, and time. He appears well-developed and well-nourished.   HENT:   Head: Normocephalic and atraumatic.   Eyes: EOM are normal.   Neck: Normal range of motion. Neck supple.   Cardiovascular: Normal rate, regular rhythm and normal heart sounds.   Pulmonary/Chest: Effort normal and breath sounds normal.   Abdominal: Soft. Normal appearance and bowel sounds are normal. A hernia is present. Hernia confirmed positive in the left inguinal area.    Reducible left inguinal hernia    Musculoskeletal: Normal range of motion.   Neurological: He is alert and oriented to person, place, and time.   Skin: Skin is warm and dry.         Laboratory        Diagnostic Results:        ASSESSMENT/PLAN:      Left Inguinal Hernia      PLAN:Plan

## 2020-03-20 NOTE — ANESTHESIA PREPROCEDURE EVALUATION
03/20/2020  Reji Gates is a 59 y.o., male.    Anesthesia Evaluation    I have reviewed the Patient Summary Reports.    I have reviewed the Nursing Notes.   I have reviewed the Medications.     Review of Systems  Social:  Former Smoker, No Alcohol Use Marijuana   Hematology/Oncology:        Hematology Comments: HIV Oncology Comments: Prostate cancer    EENT/Dental:   chronic allergic rhinitis   Cardiovascular:   Exercise tolerance: good hyperlipidemia    Hepatic/GI:   GERD, well controlled    Musculoskeletal:   Arthritis   Spine Disorders: lumbar Chronic Pain    Neurological:  Neurology Normal    Psych:   Psychiatric History anxiety depression          Physical Exam  General:  Well nourished    Airway/Jaw/Neck:  Airway Findings: Mouth Opening: Normal Tongue: Normal  General Airway Assessment: Adult, Good  Mallampati: I  TM Distance: Normal, at least 6 cm  Jaw/Neck Findings:  Neck ROM: Normal ROM      Dental:  Dental Findings: In tact, Molar caps        Mental Status:  Mental Status Findings:  Cooperative, Alert and Oriented         Anesthesia Plan  Type of Anesthesia, risks & benefits discussed:  Anesthesia Type:  general  Patient's Preference:   Intra-op Monitoring Plan: standard ASA monitors  Intra-op Monitoring Plan Comments:   Post Op Pain Control Plan: per primary service following discharge from PACU  Post Op Pain Control Plan Comments:   Induction:    Beta Blocker:         Informed Consent: Patient understands risks and agrees with Anesthesia plan.  Questions answered. Anesthesia consent signed with patient.  ASA Score: 2     Day of Surgery Review of History & Physical:    H&P update referred to the surgeon.         Ready For Surgery From Anesthesia Perspective.

## 2020-03-20 NOTE — TRANSFER OF CARE
"Anesthesia Transfer of Care Note    Patient: Reji Gates    Procedure(s) Performed: Procedure(s) (LRB):  REPAIR, HERNIA, INGUINAL, WITHOUT HISTORY OF PRIOR REPAIR, AGE 5 YEARS OR OLDER (Left)    Patient location: PACU    Anesthesia Type: general    Transport from OR: Transported from OR on 2-3 L/min O2 by NC with adequate spontaneous ventilation    Post pain: adequate analgesia    Post assessment: no apparent anesthetic complications    Post vital signs: stable    Level of consciousness: awake, alert and oriented    Nausea/Vomiting: no nausea/vomiting    Complications: none    Transfer of care protocol was followed      Last vitals:   Visit Vitals  /86 (BP Location: Left arm, Patient Position: Sitting)   Pulse 64   Temp 36.6 °C (97.8 °F) (Oral)   Resp 16   Ht 5' 10" (1.778 m)   Wt 78 kg (172 lb)   SpO2 98%   BMI 24.68 kg/m²     "
Nephrostomy complication

## 2020-03-20 NOTE — ANESTHESIA PROCEDURE NOTES
Intubation  Performed by: Carolina Groves CRNA  Authorized by: Carolina Groves CRNA     Intubation:     Induction:  Intravenous    Intubated:  Postinduction    Mask Ventilation:  Easy with oral airway    Attempts:  1    Attempted By:  CRNA    Method of Intubation:  Video laryngoscopy    Blade:  West 3    Laryngeal View Grade: Grade I - full view of chords      Difficult Airway Encountered?: No      Complications:  None    Airway Device:  Oral endotracheal tube    Airway Device Size:  7.5    Style/Cuff Inflation:  Cuffed    Inflation Amount (mL):  6    Tube secured:  22    Secured at:  The lips    Placement Verified By:  Capnometry    Complicating Factors:  Anterior larynx    Findings Post-Intubation:  BS equal bilateral

## 2020-03-20 NOTE — PROGRESS NOTES
Patient with intermittent incarceration requiring reduction.  Therefore, it was deemed that this procedure could not safely be postponed for 30 days.

## 2020-03-20 NOTE — ANESTHESIA POSTPROCEDURE EVALUATION
Anesthesia Post Evaluation    Patient: Reji Gates    Procedure(s) Performed: Procedure(s) (LRB):  REPAIR, HERNIA, INGUINAL, WITHOUT HISTORY OF PRIOR REPAIR, AGE 3 YEARS OR OLDER (Left)    Final Anesthesia Type: general    Patient location during evaluation: PACU  Patient participation: Yes- Able to Participate  Level of consciousness: awake and alert and oriented  Post-procedure vital signs: reviewed and stable  Pain management: adequate  Airway patency: patent    PONV status at discharge: No PONV  Anesthetic complications: no      Cardiovascular status: blood pressure returned to baseline and hemodynamically stable  Respiratory status: unassisted, spontaneous ventilation and room air  Hydration status: euvolemic  Follow-up not needed.          Vitals Value Taken Time   /76 3/20/2020 10:21 AM   Temp 36.4 °C (97.6 °F) 3/20/2020 10:00 AM   Pulse 60 3/20/2020 10:21 AM   Resp 18 3/20/2020 10:21 AM   SpO2 94 % 3/20/2020 10:21 AM   Vitals shown include unvalidated device data.      Event Time     Out of Recovery 10:25:20          Pain/Yohan Score: Pain Rating Prior to Med Admin: 7 (3/20/2020  9:55 AM)  Pain Rating Post Med Admin: 3 (3/20/2020 10:24 AM)  Yohan Score: 10 (3/20/2020 10:24 AM)

## 2020-03-20 NOTE — DISCHARGE INSTRUCTIONS

## 2020-05-22 ENCOUNTER — PATIENT MESSAGE (OUTPATIENT)
Dept: ADMINISTRATIVE | Facility: OTHER | Age: 60
End: 2020-05-22

## 2021-04-16 ENCOUNTER — PATIENT MESSAGE (OUTPATIENT)
Dept: RESEARCH | Facility: HOSPITAL | Age: 61
End: 2021-04-16

## 2021-06-03 ENCOUNTER — OFFICE VISIT (OUTPATIENT)
Dept: URGENT CARE | Facility: CLINIC | Age: 61
End: 2021-06-03
Payer: COMMERCIAL

## 2021-06-03 VITALS
DIASTOLIC BLOOD PRESSURE: 90 MMHG | HEIGHT: 70 IN | HEART RATE: 104 BPM | RESPIRATION RATE: 18 BRPM | SYSTOLIC BLOOD PRESSURE: 130 MMHG | OXYGEN SATURATION: 98 % | TEMPERATURE: 98 F | BODY MASS INDEX: 24.34 KG/M2 | WEIGHT: 170 LBS

## 2021-06-03 DIAGNOSIS — K52.9 GASTROENTERITIS, ACUTE: Primary | ICD-10-CM

## 2021-06-03 PROCEDURE — 74019 XR ABDOMEN FLAT AND ERECT: ICD-10-PCS | Mod: S$GLB,,, | Performed by: RADIOLOGY

## 2021-06-03 PROCEDURE — 74019 RADEX ABDOMEN 2 VIEWS: CPT | Mod: S$GLB,,, | Performed by: RADIOLOGY

## 2021-06-03 PROCEDURE — 96372 PR INJECTION,THERAP/PROPH/DIAG2ST, IM OR SUBCUT: ICD-10-PCS | Mod: S$GLB,,, | Performed by: STUDENT IN AN ORGANIZED HEALTH CARE EDUCATION/TRAINING PROGRAM

## 2021-06-03 PROCEDURE — 3008F BODY MASS INDEX DOCD: CPT | Mod: CPTII,S$GLB,, | Performed by: STUDENT IN AN ORGANIZED HEALTH CARE EDUCATION/TRAINING PROGRAM

## 2021-06-03 PROCEDURE — 99214 PR OFFICE/OUTPT VISIT, EST, LEVL IV, 30-39 MIN: ICD-10-PCS | Mod: 25,S$GLB,, | Performed by: STUDENT IN AN ORGANIZED HEALTH CARE EDUCATION/TRAINING PROGRAM

## 2021-06-03 PROCEDURE — 96372 THER/PROPH/DIAG INJ SC/IM: CPT | Mod: S$GLB,,, | Performed by: STUDENT IN AN ORGANIZED HEALTH CARE EDUCATION/TRAINING PROGRAM

## 2021-06-03 PROCEDURE — 99214 OFFICE O/P EST MOD 30 MIN: CPT | Mod: 25,S$GLB,, | Performed by: STUDENT IN AN ORGANIZED HEALTH CARE EDUCATION/TRAINING PROGRAM

## 2021-06-03 PROCEDURE — 3008F PR BODY MASS INDEX (BMI) DOCUMENTED: ICD-10-PCS | Mod: CPTII,S$GLB,, | Performed by: STUDENT IN AN ORGANIZED HEALTH CARE EDUCATION/TRAINING PROGRAM

## 2021-06-03 RX ORDER — CIPROFLOXACIN 500 MG/1
500 TABLET ORAL EVERY 12 HOURS
Qty: 10 TABLET | Refills: 0 | Status: SHIPPED | OUTPATIENT
Start: 2021-06-03 | End: 2021-06-08

## 2021-06-03 RX ORDER — ONDANSETRON 2 MG/ML
8 INJECTION INTRAMUSCULAR; INTRAVENOUS
Status: COMPLETED | OUTPATIENT
Start: 2021-06-03 | End: 2021-06-03

## 2021-06-03 RX ORDER — PROMETHAZINE HYDROCHLORIDE 25 MG/1
25 TABLET ORAL EVERY 6 HOURS PRN
Qty: 5 TABLET | Refills: 0 | Status: SHIPPED | OUTPATIENT
Start: 2021-06-03 | End: 2021-06-06

## 2021-06-03 RX ADMIN — ONDANSETRON 8 MG: 2 INJECTION INTRAMUSCULAR; INTRAVENOUS at 10:06

## 2021-08-05 ENCOUNTER — TELEPHONE (OUTPATIENT)
Dept: SLEEP MEDICINE | Facility: CLINIC | Age: 61
End: 2021-08-05

## 2022-09-12 NOTE — INTERVAL H&P NOTE
The patient has been examined and the H&P has been reviewed:    I concur with the findings and no changes have occurred since H&P was written.            Active Hospital Problems    Diagnosis  POA    Right inguinal hernia [K40.90]  Yes      Resolved Hospital Problems   No resolved problems to display.     
Detail Level: Generalized
Detail Level: Detailed

## 2024-11-07 ENCOUNTER — LAB VISIT (OUTPATIENT)
Dept: LAB | Facility: HOSPITAL | Age: 64
End: 2024-11-07
Attending: STUDENT IN AN ORGANIZED HEALTH CARE EDUCATION/TRAINING PROGRAM
Payer: MEDICARE

## 2024-11-07 ENCOUNTER — OFFICE VISIT (OUTPATIENT)
Dept: NEUROLOGY | Facility: CLINIC | Age: 64
End: 2024-11-07
Payer: MEDICARE

## 2024-11-07 VITALS
DIASTOLIC BLOOD PRESSURE: 72 MMHG | BODY MASS INDEX: 25.97 KG/M2 | HEART RATE: 59 BPM | WEIGHT: 181 LBS | SYSTOLIC BLOOD PRESSURE: 129 MMHG

## 2024-11-07 DIAGNOSIS — R41.3 OTHER AMNESIA: ICD-10-CM

## 2024-11-07 DIAGNOSIS — G25.81 RESTLESS LEGS: ICD-10-CM

## 2024-11-07 DIAGNOSIS — R68.89 FORGETFULNESS: ICD-10-CM

## 2024-11-07 DIAGNOSIS — G62.9 NEUROPATHY: Primary | ICD-10-CM

## 2024-11-07 PROCEDURE — 99999 PR PBB SHADOW E&M-EST. PATIENT-LVL III: CPT | Mod: PBBFAC,,, | Performed by: STUDENT IN AN ORGANIZED HEALTH CARE EDUCATION/TRAINING PROGRAM

## 2024-11-07 PROCEDURE — 36415 COLL VENOUS BLD VENIPUNCTURE: CPT | Performed by: STUDENT IN AN ORGANIZED HEALTH CARE EDUCATION/TRAINING PROGRAM

## 2024-11-07 PROCEDURE — 99205 OFFICE O/P NEW HI 60 MIN: CPT | Mod: S$PBB,,, | Performed by: STUDENT IN AN ORGANIZED HEALTH CARE EDUCATION/TRAINING PROGRAM

## 2024-11-07 PROCEDURE — 83520 IMMUNOASSAY QUANT NOS NONAB: CPT | Performed by: STUDENT IN AN ORGANIZED HEALTH CARE EDUCATION/TRAINING PROGRAM

## 2024-11-07 PROCEDURE — 99213 OFFICE O/P EST LOW 20 MIN: CPT | Mod: PBBFAC | Performed by: STUDENT IN AN ORGANIZED HEALTH CARE EDUCATION/TRAINING PROGRAM

## 2024-11-07 RX ORDER — TADALAFIL 5 MG/1
5 TABLET ORAL
COMMUNITY
Start: 2024-11-04

## 2024-11-07 RX ORDER — AMLODIPINE BESYLATE 10 MG/1
10 TABLET ORAL
COMMUNITY
Start: 2024-09-23

## 2024-11-07 RX ORDER — ELVITEGRAVIR, COBICISTAT, EMTRICITABINE, AND TENOFOVIR ALAFENAMIDE 150; 150; 200; 10 MG/1; MG/1; MG/1; MG/1
TABLET ORAL
COMMUNITY
Start: 2020-11-01

## 2024-11-07 NOTE — PROGRESS NOTES
[facility]  Clinic Note    Date of Service: 11/7/2024  Patient seen at the request of: No ref. provider found    Reason for Consultation  Peripheral neuropathy    Assessment:  Reji Gates is a 63 y.o. male who presents with peripheral neuropathy.    Patient reports a three year history of burning at the bottom of his feet and sometimes in his hands.  No significant progression has been noted.  Patient has a chronic history of HIV infection, however, it is currently well controlled on HAART therapy.  Of note, the patient reports that he was told that he had a problem with the venous return from his lower extremities.  He clinically notes swelling in his feet after the end of a long day (the patient works on his feet as a ).  On exam, patient has some subtle loss of vibratory sensation in the toes, however, no other signs of neuropathy seen.  Outside labs showed a normal TSH, B12, A1c, and liver enzymes.  HCV and RPR were nonreactive.    Query the effect of poor vascular flow contributing to a localized, distal small fiber neuropathy.  Patient also reports concurrent arthritis in the area.  Differential includes HIV associated distal sensory polyneuropathy.    Patient also reports some restlessness in his feet at night which has improved with treatment of his sleep apnea with a CPAP.  Outside ferritin lab was normal.    Patient also notes some chronic forgetfulness in the setting of chronic daily marijuana use and a family history of Alzheimer's.  MoCA was a 26/30 with 4/5 on delayed recall.      Plan:    - [id number]  - MRI brain wo contrast  - vascular surgery    - can consider EMG/NCS in the future (would likely be subclinical currently)      - RTC 2 months    A dictation device was used to produce this document. Use of such devices sometimes results in grammatical errors or replacement of words that sound similarly.     Signed:    Bean Acuña MD  Neurology/Epilepsy  11/07/2024 8:37  AM      HPI:  Reji Gates is a 63 y.o. male with   Past Medical History:   Diagnosis Date    Anxiety     Arthritis     Depression     Environmental and seasonal allergies     GERD (gastroesophageal reflux disease)     HIV infection     Hyperlipidemia     Hypogonadism male     Low back pain     Prostate disease      HIV on Genvoya    Patient reports burning on the plantar sides of his feet that he has noticed for the last 3 years.  This has been fluctuating.  Patient reports pain down his lower extremities that is triggered by staying still (sitting upright or standing) for longer periods of time.  This is a shooting pain down his legs.  This can be triggered by longer shifts at work as well.  Patient is a .      Patient reports using a TENS unit on his feet and back, with improvement to his pain.  Patient has only taken Aleve for his pain, and rarely.      Patient reports that he has seen a vascular physician before who was concerned about venous return from the lower extremities.      He reports some restlessness of his feet at night.  Patient used to get restless leg daily, now improved with improved sleep.    Patient reports a diagnosis of sleep apnea about a year ago.  He has been sleeping with a CPAP and reports improved sleep.  This has improved the restless leg.      Patient reports a childhood history of impulsive head nod, eye twitching, and cough.  Some need to head nod, but cough has not occurred since childhood.      Reports T cells run around 700-800 range.  Undetectable viral load.  Patient has been on Genvoya for 3 years, prior to that Stribild, prior to that combavir/kaletra.    HIV+ since 1987, medications since 2002    Patient reports forgetfulness for about 10 years that may be getting worse.  Patient lives with a roommate.  He has been told he is repetitive.  Daily marijuana user since 1976.  Sometimes misplaces/loses things.  Patient not getting lost himself.  Able to manage all  activities of daily living.      Family history:  One brother diagnosed with Alzheimer's at about 80 years old.      From outside labs:  Ferritin 92  TSH 1.9  B12 711  A1c 5.7  CMP and liver enzymes normal  HCV nonreactive  RPR nonreactive          This is the extent of the patient's complaints at this time.    TSH   Date Value Ref Range Status   11/29/2018 2.13 0.40 - 4.50 mIU/L Final   08/28/2018 2.50 0.40 - 4.50 mIU/L Final         Review of Systems:  ROS negative unless noted in HPI    Past Surgical History:  Past Surgical History:   Procedure Laterality Date    NOSE SURGERY      WISDOM TOOTH EXTRACTION         Family History:  Family History   Problem Relation Name Age of Onset    Hypertension Mother      Arthritis Mother      Cancer Father      COPD Sister      No Known Problems Maternal Grandmother      No Known Problems Maternal Grandfather      No Known Problems Paternal Grandmother      No Known Problems Paternal Grandfather         Social History:  Social History     Tobacco Use    Smoking status: Former     Types: Cigarettes    Smokeless tobacco: Never   Substance Use Topics    Alcohol use: No     Alcohol/week: 0.0 standard drinks of alcohol    Drug use: Yes     Types: Marijuana     Comment: daily       Allergies:  Milk containing products (dairy)    Outpatient Medications:  Prior to Admission medications    Medication Sig Start Date End Date Taking? Authorizing Provider   acyclovir (ZOVIRAX) 800 MG Tab Take 1 tablet (800 mg total) by mouth once daily. 11/4/19 10/31/20  Jamar Mike MD   ALPRAZolam (XANAX) 0.5 MG tablet Take 1 tablet (0.5 mg total) by mouth once daily. 11/4/19   Jamar Mike MD   ascorbic acid (VITAMIN C) 1000 MG tablet Take 1,000 mg by mouth 2 (two) times daily.    Provider, Historical   clindamycin phosphate 1% (CLINDAGEL) 1 % gel APPLY BID 11/8/16   Provider, Historical   oglelwtd-udcntcou-epofvb-tenof, STRIBILD, [id number] mg (STRIBILD) [id number] mg per  "tablet Take 1 tablet by mouth once daily. 11/4/19 11/3/20  Jamar Mike MD   FISH,SAF,FLX,BRG OILS/O3,6,9#2 (FISH-FLAX-BORAGE OIL ORAL) Take 1 capsule by mouth 2 (two) times daily.    Provider, Historical   hydrocodone-ibuprofen 7.5-200 mg (VICOPROFEN) 7.5-200 mg per tablet Take 1 tablet by mouth every 6 (six) hours as needed for Pain.  Patient not taking: Reported on 6/3/2021 11/4/19   Jamar Mike MD   mometasone (NASONEX) 50 mcg/actuation nasal spray USE 2 SPRAYS NASALLY ONE TIME DAILY 11/4/19   Jamar Mike MD   montelukast (SINGULAIR) 10 mg tablet Take 1 tablet (10 mg total) by mouth every evening. 11/4/19   Jamar Mike MD   MULTIVIT-MINERALS/FERROUS FUM (MULTI VITAMIN ORAL) Take by mouth.    Provider, Historical   naproxen sodium (ALEVE ORAL) Take by mouth as needed.    Provider, Historical   oxyCODONE-acetaminophen (PERCOCET) 7.5-325 mg per tablet Take 1 tablet by mouth every 6 (six) hours as needed.  Patient not taking: Reported on 6/3/2021 3/20/20   Luis Miguel Artis Jr., MD   ranitidine (ZANTAC) 150 MG tablet Take 150 mg by mouth 2 (two) times daily.    Provider, Historical   syringe with needle, safety 3 mL 20 gauge x 1 1/2" Syrg 1 Device by Misc.(Non-Drug; Combo Route) route every 14 (fourteen) days. 5/8/19   Jamar Mike MD   tamsulosin (FLOMAX) 0.4 mg Cap Take 1 capsule (0.4 mg total) by mouth Daily.  Patient not taking: Reported on 6/3/2021 11/4/19   Jamar Mike MD       Physical exam:    Vitals: /72 (BP Location: Left arm, Patient Position: Sitting)   Pulse (!) 59   Wt 82.1 kg (181 lb)   BMI 25.97 kg/m²     General:   Sitting in chair, in no distress, well-nourished, well-developed, appears stated age.  Head/Neck:   Normocephalic,atraumatic  Pulm:  Non-labored breathing     Mental Status: Alert and oriented to person, time, place, situation. Speech spontaneous and fluent without paraphasias; no dysarthria  CN:  II: visual fields full  III, IV, VI: EOM " intact without nystagmus or diplopia.   V: Sensation to light touch full and symmetric in V1-3. [place] contraction full bilaterally.   VII: Facial movement full and symmetric.   VIII: Hearing grossly normal to conversation.  IX, X: Palate midline with symmetric elevation.    XI: SCM and trapezius: 5/5 bilaterally.   XII: Tongue midline without fasciculations.  Motor: Normal bulk and tone throughout all four extremities.   LUE: D: 5/5; B: 5/5; T:  5/5; WF: 5/5; WE:  5/5; IO: 5/5   RUE: D: 5/5; B: 5/5; T:  5/5; WF:5/5; WE:  5/5; IO: 5/5   LLE: HF: 5/5, KE: 5/5, KF: 5/5, DF: 5/5, PF: 5/5  RLE: HF: 5/5, KE: 5/5, KF: 5/5, DF: 5/5, PF: 5/5  No tremors   Sensory: Decreased sensation to vibratory sensation vibratory sensation to the toes  Reflexes: LUE: Biceps 2+ brachioradialis 2+  RUE: Biceps 2+, brachioradialis 2+  LLE: Knee 2+, ankle 2+  RLE: Knee 2+, ankle 2+  Babinski downgoing bilaterally  Coordination:  Intact and symmetric to finger-to-nose and heel-to-shin.  Gait:  Intact to casual gait.    MoCA 11/7/2024:            Imaging:  All pertinent imaging was personally reviewed.      I spent a total of 65 minutes on the day of the visit. This includes face to face time and non-face to face time preparing to see the patient (eg, review of tests), obtaining and/or reviewing separately obtained history, documenting clinical information in the electronic or other health record, independently interpreting results and communicating results to the patient/family/caregiver, or care coordinator.

## 2024-11-12 LAB — PHOSPHO-TAU (181P): 0.67 PG/ML (ref 0–0.97)

## 2024-11-18 ENCOUNTER — HOSPITAL ENCOUNTER (OUTPATIENT)
Dept: RADIOLOGY | Facility: HOSPITAL | Age: 64
Discharge: HOME | End: 2024-11-18
Attending: STUDENT IN AN ORGANIZED HEALTH CARE EDUCATION/TRAINING PROGRAM
Payer: MEDICARE

## 2024-11-18 DIAGNOSIS — G62.9 NEUROPATHY: ICD-10-CM

## 2024-11-18 DIAGNOSIS — R41.3 OTHER AMNESIA: ICD-10-CM

## 2024-11-18 PROCEDURE — 70551 MRI BRAIN STEM W/O DYE: CPT | Mod: TC

## 2024-11-18 PROCEDURE — 70551 MRI BRAIN STEM W/O DYE: CPT | Mod: 26,,, | Performed by: RADIOLOGY

## 2024-12-02 DIAGNOSIS — G62.9 NEUROPATHY: Primary | ICD-10-CM

## 2024-12-06 ENCOUNTER — OFFICE VISIT (OUTPATIENT)
Dept: CARDIOLOGY | Facility: CLINIC | Age: 64
End: 2024-12-06
Payer: MEDICARE

## 2024-12-06 VITALS
DIASTOLIC BLOOD PRESSURE: 68 MMHG | BODY MASS INDEX: 25.66 KG/M2 | HEIGHT: 70 IN | HEART RATE: 56 BPM | SYSTOLIC BLOOD PRESSURE: 117 MMHG | WEIGHT: 179.25 LBS

## 2024-12-06 DIAGNOSIS — M79.605 PAIN IN BOTH LOWER EXTREMITIES: ICD-10-CM

## 2024-12-06 DIAGNOSIS — E78.2 MIXED HYPERLIPIDEMIA: ICD-10-CM

## 2024-12-06 DIAGNOSIS — M54.16 LUMBAR RADICULOPATHY, CHRONIC: ICD-10-CM

## 2024-12-06 DIAGNOSIS — G89.29 CHRONIC BILATERAL LOW BACK PAIN WITHOUT SCIATICA: ICD-10-CM

## 2024-12-06 DIAGNOSIS — M79.604 PAIN IN BOTH LOWER EXTREMITIES: ICD-10-CM

## 2024-12-06 DIAGNOSIS — M54.50 CHRONIC BILATERAL LOW BACK PAIN WITHOUT SCIATICA: ICD-10-CM

## 2024-12-06 DIAGNOSIS — G62.9 NEUROPATHY: Primary | ICD-10-CM

## 2024-12-06 DIAGNOSIS — M79.89 LEG SWELLING: ICD-10-CM

## 2024-12-06 DIAGNOSIS — I73.9 PERIPHERAL VASCULAR DISEASE, UNSPECIFIED: ICD-10-CM

## 2024-12-06 PROBLEM — M79.606 LEG PAIN: Status: ACTIVE | Noted: 2024-12-06

## 2024-12-06 PROCEDURE — 99999 PR PBB SHADOW E&M-EST. PATIENT-LVL IV: CPT | Mod: PBBFAC,,, | Performed by: INTERNAL MEDICINE

## 2024-12-06 PROCEDURE — 99214 OFFICE O/P EST MOD 30 MIN: CPT | Mod: PBBFAC,PO | Performed by: INTERNAL MEDICINE

## 2024-12-06 NOTE — PATIENT INSTRUCTIONS
Assessment/Plan:  Reji Gates is a 63 y.o. male with HIV, HLD, low back pain, arthritis, who presents for an initial appointment.    Leg Swelling- Likely due to venous insufficiency. Check BLE venous reflux study.  Recommend wearing graduated compression hose.  Limit sodium intake to 2000 mg daily.  Limit volume intake to 1.5 L daily.  Elevate legs when resting.    2. Back Pain/Leg Pain- Likely due to lumbar ddd. Must also consider flow limiting PAD. Check MRI lumbar spine, exercise RAFAELA study and CTA abd/pelvis with iliofemoral runoff.       3. HLD- Check lipids.     Follow up in 3 months with lipids prior   Isotretinoin Counseling: Patient should get monthly blood tests, not donate blood, not drive at night if vision affected, not share medication, and not undergo elective surgery for 6 months after tx completed. Side effects reviewed, pt to contact office should one occur.

## 2024-12-06 NOTE — PROGRESS NOTES
"[facility]      Chief Complaint   Patient presents with    Peripheral Neuropathy       Patient ID: Reji Gates is a 63 y.o. male with HIV, HLD, low back pain, arthritis, who presents for an initial appointment. Pertinent history/events are as follows:     -Pt kindly referred by Dr. Acuña for evaluation of Neuropathy (per his note on 11/7/2024):  "Patient reports a three year history of burning at the bottom of his feet and sometimes in his hands.  No significant progression has been noted.  Patient has a chronic history of HIV infection, however, it is currently well controlled on HAART therapy.  Of note, the patient reports that he was told that he had a problem with the venous return from his lower extremities.  He clinically notes swelling in his feet after the end of a long day (the patient works on his feet as a ).  On exam, patient has some subtle loss of vibratory sensation in the toes, however, no other signs of neuropathy seen.  Outside labs showed a normal TSH, B12, A1c, and liver enzymes.  HCV and RPR were nonreactive.  Query the effect of poor vascular flow contributing to a localized, distal small fiber neuropathy.  Patient also reports concurrent arthritis in the area.  Differential includes HIV associated distal sensory polyneuropathy."    HPI:  Mr. Gates reports pain in feet, lower back, neck.  Also reports leg swelling. No claudication symptoms or tissue loss.     Past Medical History:   Diagnosis Date    Anxiety     Arthritis     Depression     Environmental and seasonal allergies     GERD (gastroesophageal reflux disease)     HIV infection     Hyperlipidemia     Hypogonadism male     Low back pain     Prostate disease      Past Surgical History:   Procedure Laterality Date    NOSE SURGERY      WISDOM TOOTH EXTRACTION       Social History     Socioeconomic History    Marital status: Single   Tobacco Use    Smoking status: Former     Types: Cigarettes    Smokeless " tobacco: Never   Substance and Sexual Activity    Alcohol use: No     Alcohol/week: 0.0 standard drinks of alcohol    Drug use: Yes     Types: Marijuana     Comment: daily    Sexual activity: Yes     Partners: Male     Social Drivers of Health     Financial Resource Strain: Medium Risk (12/5/2024)    Overall Financial Resource Strain (CARDIA)     Difficulty of Paying Living Expenses: Somewhat hard   Food Insecurity: No Food Insecurity (12/5/2024)    Hunger Vital Sign     Worried About Running Out of Food in the Last Year: Never true     Ran Out of Food in the Last Year: Never true   Physical Activity: Unknown (12/5/2024)    Exercise Vital Sign     Days of Exercise per Week: 4 days     Minutes of Exercise per Session: Patient declined   Stress: Stress Concern Present (12/5/2024)    [employer] [place] [employer] - Occupational Stress Questionnaire     Feeling of Stress : To some extent   Housing Stability: High Risk (12/5/2024)    Housing Stability Vital Sign     Unable to Pay for Housing in the Last Year: Yes     Family History   Problem Relation Name Age of Onset    Hypertension Mother      Arthritis Mother      Cancer Father      COPD Sister      No Known Problems Maternal Grandmother      No Known Problems Maternal Grandfather      No Known Problems Paternal Grandmother      No Known Problems Paternal Grandfather         Review of patient's allergies indicates:   Allergen Reactions    Milk containing products (dairy) Diarrhea and Nausea Only       Medication List with Changes/Refills   Current Medications    AMLODIPINE (NORVASC) 10 MG TABLET    Take 10 mg by mouth once daily.    ASCORBIC ACID (VITAMIN C) 1000 MG TABLET    Take 1,000 mg by mouth 2 (two) times daily.    CLINDAMYCIN PHOSPHATE 1% (CLINDAGEL) 1 % GEL    as needed.    GENVOYA 500-631-237-10 MG TAB    Daily.    MULTIVIT-MINERALS/FERROUS FUM (MULTI VITAMIN ORAL)    Take 1 capsule by mouth Daily.    NAPROXEN SODIUM (ALEVE ORAL)    Take by mouth  "as needed.    RANITIDINE (ZANTAC) 150 MG TABLET    Take 150 mg by mouth 2 (two) times daily.    TADALAFIL (CIALIS) 5 MG TABLET    Take 5 mg by mouth Daily.   Discontinued Medications    FISH,SAF,FLX,BRG OILS/O3,6,9#2 (FISH-FLAX-BORAGE OIL ORAL)    Take 1 capsule by mouth 2 (two) times daily.    TAMSULOSIN (FLOMAX) 0.4 MG CAP    Take 1 capsule (0.4 mg total) by mouth Daily.       Review of Systems  Constitution: Denies chills, fever, and sweats.  HENT: Denies headaches or blurry vision.  Cardiovascular: Denies chest pain or irregular heart beat.  Respiratory: Denies cough or shortness of breath.  Gastrointestinal: Denies abdominal pain, nausea, or vomiting.  Musculoskeletal: Denies muscle cramps.  Neurological: Denies dizziness or focal weakness.  Psychiatric/Behavioral: Normal mental status.  Hematologic/Lymphatic: Denies bleeding problem or easy bruising/bleeding.  Skin: Denies rash or suspicious lesions    Physical Examination  /68 (BP Location: Right arm, Patient Position: Sitting)   Pulse (!) 56   Ht 5' 10" (1.778 m)   Wt 81.3 kg (179 lb 3.7 oz)   BMI 25.72 kg/m²     Constitutional: No acute distress, conversant  HEENT: Sclera anicteric, Pupils equal, round and reactive to light, extraocular motions intact, Oropharynx clear  Neck: No JVD, no carotid bruits  Cardiovascular: regular rate and rhythm, no murmur, rubs or gallops, normal S1/S2  Pulmonary: Clear to auscultation bilaterally  Abdominal: Abdomen soft, nontender, nondistended, positive bowel sounds  Extremities: BLE's with trace to 1 pitting edema and venous stasis dermatitis   Pulses:  Carotid pulses are 2+ on the right side, and 2+ on the left side.  Radial pulses are 2+ on the right side, and 2+ on the left side.   Femoral pulses are 2+ on the right side, and 2+ on the left side.  Popliteal pulses are 2+ on the right side, and 2+ on the left side.   Dorsalis pedis pulses are 2+ on the right side, and 2+ on the left side.   Posterior tibial " "pulses are 2+ on the right side, and 2+ on the left side.    Skin: No ecchymosis, erythema, or ulcers  Psych: Alert and oriented x 3, appropriate affect  Neuro: CNII-XII intact, no focal deficits    Labs:  Most Recent Data  CBC:   Lab Results   Component Value Date    WBC 5.5 10/24/2019    HGB 15.5 10/24/2019    HCT 45.8 10/24/2019     10/24/2019    MCV 89.6 10/24/2019    RDW 12.8 10/24/2019     BMP:   Lab Results   Component Value Date     10/24/2019    K 4.4 10/24/2019     10/24/2019    CO2 27 10/24/2019    BUN 25 10/24/2019    CREATININE 1.06 10/24/2019    GLU 90 10/24/2019    CALCIUM 9.3 10/24/2019     LFTS;   Lab Results   Component Value Date    PROT 6.6 10/24/2019    ALBUMIN 4.3 10/24/2019    BILITOT 0.5 10/24/2019    AST 31 10/24/2019    ALKPHOS 78 10/24/2019    ALT 24 10/24/2019     COAGS: No results found for: "INR", "PROTIME", "PTT"  FLP:   Lab Results   Component Value Date    CHOL 117 10/24/2019    HDL 39 (L) 10/24/2019    LDLCALC 61 10/24/2019    TRIG 87 10/24/2019    CHOLHDL 3.0 10/24/2019     CARDIAC: No results found for: "TROPONINI", "CKTOTAL", "CKMB", "BNP"    Imaging:    Assessment/Plan:  Reji Gates is a 63 y.o. male with HIV, HLD, low back pain, arthritis, who presents for an initial appointment.    Leg Swelling- Likely due to venous insufficiency. Check BLE venous reflux study.  Recommend wearing graduated compression hose.  Limit sodium intake to 2000 mg daily.  Limit volume intake to 1.5 L daily.  Elevate legs when resting.    2. Back Pain/Leg Pain- Likely due to lumbar ddd. Must also consider flow limiting PAD. Check MRI lumbar spine, exercise RAFAELA study and CTA abd/pelvis with iliofemoral runoff.       3. HLD- Check lipids.     Follow up in 3 months with lipids prior    Total duration of face to face visit time 25 minutes.  Total time spent counseling greater than fifty percent of total visit time.  Counseling included discussion regarding imaging findings, " diagnosis, possibilities, treatment options, risks and benefits.  The patient had many questions regarding the options and long-term effects.    Delta Moreno MD, PhD  Interventional Cardiology

## 2024-12-11 ENCOUNTER — HOSPITAL ENCOUNTER (OUTPATIENT)
Dept: CARDIOLOGY | Facility: HOSPITAL | Age: 64
Discharge: HOME | End: 2024-12-11
Attending: INTERNAL MEDICINE
Payer: MEDICARE

## 2024-12-11 DIAGNOSIS — M79.605 PAIN IN BOTH LOWER EXTREMITIES: ICD-10-CM

## 2024-12-11 DIAGNOSIS — M79.604 PAIN IN BOTH LOWER EXTREMITIES: ICD-10-CM

## 2024-12-11 PROCEDURE — 93924 LWR XTR VASC STDY BILAT: CPT | Mod: 26,,, | Performed by: INTERNAL MEDICINE

## 2024-12-11 PROCEDURE — 93924 LWR XTR VASC STDY BILAT: CPT

## 2024-12-11 PROCEDURE — 93970 EXTREMITY STUDY: CPT | Mod: TC

## 2024-12-11 PROCEDURE — 93970 EXTREMITY STUDY: CPT | Mod: 26,,, | Performed by: INTERNAL MEDICINE

## 2024-12-12 LAB
IMMEDIATE ARM BP: 141 MMHG
IMMEDIATE LEFT ABI: 1.09
IMMEDIATE LEFT TIBIAL: 153 MMHG
IMMEDIATE RIGHT ABI: 1.1
IMMEDIATE RIGHT TIBIAL: 155 MMHG
LEFT ABI: 1.08
LEFT ARM BP: 123 MMHG
LEFT DORSALIS PEDIS: 141 MMHG
LEFT GREAT SAPHENOUS DISTAL THIGH DIA: 0.2 CM
LEFT GREAT SAPHENOUS JUNCTION DIA: 0.24 CM
LEFT GREAT SAPHENOUS KNEE DIA: 0.14 CM
LEFT GREAT SAPHENOUS MIDDLE THIGH DIA: 0.23 CM
LEFT GREAT SAPHENOUS PROXIMAL CALF DIA: 0.14 CM
LEFT POSTERIOR TIBIAL: 139 MMHG
LEFT SMALL SAPHENOUS KNEE DIA: 0.14 CM
LEFT SMALL SAPHENOUS SPJ DIA: 0.16 CM
LEFT TBI: 0.92
LEFT TOE PRESSURE: 119 MMHG
RIGHT ABI: 1.07
RIGHT ARM BP: 130 MMHG
RIGHT DORSALIS PEDIS: 139 MMHG
RIGHT GREAT SAPHENOUS DISTAL THIGH DIA: 0.25 CM
RIGHT GREAT SAPHENOUS JUNCTION DIA: 0.2 CM
RIGHT GREAT SAPHENOUS KNEE DIA: 0.26 CM
RIGHT GREAT SAPHENOUS MIDDLE THIGH DIA: 0.28 CM
RIGHT GREAT SAPHENOUS PROXIMAL CALF DIA: 0.23 CM
RIGHT GREAT SAPHENOUS PROXIMAL CALF REFLUX: 873 MS
RIGHT POSTERIOR TIBIAL: 138 MMHG
RIGHT SMALL SAPHENOUS KNEE DIA: 0.2 CM
RIGHT SMALL SAPHENOUS SPJ DIA: 0.16 CM
RIGHT TBI: 0.99
RIGHT TOE PRESSURE: 129 MMHG

## 2024-12-19 ENCOUNTER — HOSPITAL ENCOUNTER (OUTPATIENT)
Dept: RADIOLOGY | Facility: HOSPITAL | Age: 64
Discharge: HOME | End: 2024-12-19
Attending: INTERNAL MEDICINE
Payer: MEDICARE

## 2024-12-19 DIAGNOSIS — I73.9 PERIPHERAL VASCULAR DISEASE, UNSPECIFIED: ICD-10-CM

## 2024-12-19 DIAGNOSIS — M54.16 LUMBAR RADICULOPATHY, CHRONIC: ICD-10-CM

## 2024-12-19 PROCEDURE — 72148 MRI LUMBAR SPINE W/O DYE: CPT | Mod: TC

## 2024-12-19 PROCEDURE — 72148 MRI LUMBAR SPINE W/O DYE: CPT | Mod: 26,,, | Performed by: RADIOLOGY

## 2024-12-19 PROCEDURE — 75635 CT ANGIO ABDOMINAL ARTERIES: CPT | Mod: 26,,, | Performed by: RADIOLOGY

## 2024-12-19 PROCEDURE — 75635 CT ANGIO ABDOMINAL ARTERIES: CPT | Mod: TC

## 2024-12-19 PROCEDURE — 25500020 PHARM REV CODE 255: Performed by: INTERNAL MEDICINE

## 2024-12-19 RX ADMIN — IOHEXOL 120 ML: 350 INJECTION, SOLUTION INTRAVENOUS at 08:12

## 2025-01-16 ENCOUNTER — TELEPHONE (OUTPATIENT)
Dept: NEUROLOGY | Facility: CLINIC | Age: 65
End: 2025-01-16
Payer: MEDICARE

## 2025-01-16 NOTE — TELEPHONE ENCOUNTER
Called patient to reschedule appointment due to provider being out of the clinic. Patient confirmed rescheduled date for 01/31 at 10:20 AM.
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
## 2025-01-31 ENCOUNTER — OFFICE VISIT (OUTPATIENT)
Dept: NEUROLOGY | Facility: CLINIC | Age: 65
End: 2025-01-31
Payer: MEDICARE

## 2025-01-31 VITALS
BODY MASS INDEX: 25.27 KG/M2 | DIASTOLIC BLOOD PRESSURE: 80 MMHG | HEART RATE: 58 BPM | WEIGHT: 176.13 LBS | SYSTOLIC BLOOD PRESSURE: 144 MMHG

## 2025-01-31 DIAGNOSIS — M54.16 LUMBAR RADICULOPATHY: Primary | ICD-10-CM

## 2025-01-31 PROCEDURE — 99213 OFFICE O/P EST LOW 20 MIN: CPT | Mod: PBBFAC | Performed by: STUDENT IN AN ORGANIZED HEALTH CARE EDUCATION/TRAINING PROGRAM

## 2025-01-31 PROCEDURE — 99213 OFFICE O/P EST LOW 20 MIN: CPT | Mod: S$PBB,,, | Performed by: STUDENT IN AN ORGANIZED HEALTH CARE EDUCATION/TRAINING PROGRAM

## 2025-01-31 PROCEDURE — 99999 PR PBB SHADOW E&M-EST. PATIENT-LVL III: CPT | Mod: PBBFAC,,, | Performed by: STUDENT IN AN ORGANIZED HEALTH CARE EDUCATION/TRAINING PROGRAM

## 2025-01-31 RX ORDER — GABAPENTIN 100 MG/1
300 CAPSULE ORAL 3 TIMES DAILY
Qty: 270 CAPSULE | Refills: 11 | Status: SHIPPED | OUTPATIENT
Start: 2025-01-31 | End: 2026-01-31

## 2025-02-04 ENCOUNTER — CLINICAL SUPPORT (OUTPATIENT)
Dept: REHABILITATION | Facility: HOSPITAL | Age: 65
End: 2025-02-04
Payer: MEDICARE

## 2025-02-04 DIAGNOSIS — G89.29 CHRONIC LOW BACK PAIN WITHOUT SCIATICA, UNSPECIFIED BACK PAIN LATERALITY: Primary | Chronic | ICD-10-CM

## 2025-02-04 DIAGNOSIS — M54.16 LUMBAR RADICULOPATHY: ICD-10-CM

## 2025-02-04 DIAGNOSIS — M54.50 CHRONIC LOW BACK PAIN WITHOUT SCIATICA, UNSPECIFIED BACK PAIN LATERALITY: Primary | Chronic | ICD-10-CM

## 2025-02-04 PROCEDURE — 97110 THERAPEUTIC EXERCISES: CPT | Mod: PO | Performed by: PHYSICAL THERAPIST

## 2025-02-04 PROCEDURE — 97161 PT EVAL LOW COMPLEX 20 MIN: CPT | Mod: PO | Performed by: PHYSICAL THERAPIST

## 2025-02-05 NOTE — PROGRESS NOTES
Outpatient Rehab    Physical Therapy Evaluation    Patient Name: Reji Gates  MRN: [id number]  YOB: 1960  Today's Date: 2/4/2025    Therapy Diagnosis:   Encounter Diagnoses   Name Primary?    Lumbar radiculopathy     Chronic low back pain without sciatica, unspecified back pain laterality Yes     Physician: Bean Acuña MD    Physician Orders: Eval and Treat  Medical Diagnosis: M54.16 (ICD-10-CM) - Lumbar radiculopathy     Visit # / Visits Authorized:  1 / pending   Date of Evaluation:  2/4/2025   Insurance Authorization Period: 2/4/2025 to 12/31/2025  Plan of Care Certification:  2/4/2025 to 5/4/2025      Time In: 0800   Time Out: 0850  Total Time: 50   Total Billable Time: 50         Subjective   History of Present Illness  Reji is a 64 y.o. male who reports to physical therapy with a chief concern of lower leg pain, some back pain. According to the patient's chart, Reji has a past medical history of Anxiety, Arthritis, Depression, Environmental and seasonal allergies, GERD (gastroesophageal reflux disease), HIV infection, Hyperlipidemia, Hypogonadism male, Low back pain, and Prostate disease. Reji has a past surgical history that includes Nose surgery and [place] tooth extraction.    The patient reports a medical diagnosis of M54.16 (ICD-10-CM) - Lumbar radiculopathy.    Diagnostic tests related to this condition: MRI studies.   MRI Studies Details: Degenerative findings of the lower lumbar spine as detailed above, with severe bilateral neural foraminal narrowing at L5-S1.    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 He has noticed recently that when he is carrying things up his stairs and notices the more he carries the worse it is. He works 8 hour shifts and is able to stand the whole time and feels like when he sits down it is hard to get back up. He is not able to  do as many social activities that he used to. His legs have started to buckle at times and some foot cramping. He does have vascular issues in the lower extremities, but the vascular neuropathy tests were negative.    Activities of Daily Living  Social history was obtained from Patient.    General Prior Level of Function Comments: indep          Previously independent with activities of daily living? Yes     Currently independent with activities of daily living? Yes              Pain     Patient reports a current pain level of 2/10. Pain at best is reported as 1/10. Pain at worst is reported as 4/10.   Location: back and down the right leg at a time  Clinical Progression (since onset): Worsening  Pain Qualities: Aching, Other (Comment)  Other Pain Qualities: lingering, constant, shooting pains in the feet, some pins and needlles in the back  Pain-Relieving Factors: Heat, Elevation, Compression  Pain-Aggravating Factors: Bending, Exercise, Standing, Lifting, Movement         Living Arrangements  Living Situation  Living Arrangements: Other (Comment)    Home Setup  Home Access: Stairs with rails  Number of Levels in Home: Two level  Access to Alternate Level of Home: Stairs with rails          Past Medical History/Physical Systems Review:   Reji Gates  has a past medical history of Anxiety, Arthritis, Depression, Environmental and seasonal allergies, GERD (gastroesophageal reflux disease), HIV infection, Hyperlipidemia, Hypogonadism male, Low back pain, and Prostate disease.    Reji Gates  has a past surgical history that includes Nose surgery and [place] tooth extraction.    Reji has a current medication list which includes the following prescription(s): amlodipine, ascorbic acid (vitamin c), clindamycin phosphate 1%, gabapentin, genvoya, multivit-min/ferrous fumarate, naproxen sodium, ranitidine, and tadalafil.    Review of patient's allergies indicates:   Allergen Reactions    Milk containing  products (dairy) Diarrhea and Nausea Only        Objective   Posture  Patient presents with a Forward head position. Increased thoracic kyphosis is observed.                 Spinal Muscle Palpation  Right Spinal Muscle Palpation  Abnormal: Lumbar/Sacral  Right Lumbar/Sacral Muscle Palpation Observations: global tightness present in hip complex and GM.piriformis, QL lower paraspijals       Left Spinal Muscle Palpation  Abnormal: Lumbar/Sacral  Left Lumbar/Sacral Muscle Palpation Observations: tightnessand tenderness in glute medius and piriformis, QL lower paraspinals       Hip Palpation  Right Hip Palpation  Abnormal: Lumbar/Sacral Muscle  Right Lumbar/Sacral Muscle Palpation Observations: global tightness present in hip complex and GM.piriformis, QL lower paraspijals       Left Hip Palpation  Abnormal: Lumbar/Sacral Muscle  Left Lumbar/Sacral Muscle Palpation Observations: tightnessand tenderness in glute medius and piriformis, QL lower paraspinals            Lumbar Range of Motion   All within functional limits bilaterally without pain increasing                         Gait Analysis  Gait Analysis Details  Amb with good step length, reduced hip rotation         Intake Outcome Measure for FOTO Survey    Therapist reviewed FOTO scores for Reji Gates on 2/4/2025.   FOTO report - see Media section or FOTO account episode details.     Intake Score:  %    Treatment:                           Patient's spiritual, cultural, and educational needs considered and patient agreeable to plan of care and goals.     Assessment & Plan   Assessment  Reji presents with a condition of Low complexity.   Presentation of Symptoms: Stable  Will Comorbidities Impact Care: Yes       Functional Limitations: Activity tolerance, Bed mobility, Completing self-care activities, Completing work/school activities, Disrupted sleep pattern, Functional mobility, Carrying objects  Impairments: Abnormal muscle firing, Abnormal muscle tone,  Activity intolerance, Pain with functional activity    Patient Goal for Therapy (PT): be able to walk and rise after sitting with less stiffness and pain, improve carrying ability at work  Prognosis: Good  Assessment Details: Reji presents to therapy with symptoms consistent with bilateral lumbar spine stiffness and muscle guarding present with functional lateral hip weakness. He has a history of lumbar facet arthritis. He is on his feet for >8 hours shifts and this does also put a strain on the core muscles and will benefit from functional strengthening and dynamic core stability activity    Plan  From a physical therapy perspective, the patient would benefit from: Skilled Rehab Services    Planned therapy interventions include: Therapeutic exercise, Therapeutic activities, Manual therapy, ADLs/IADLs, Neuromuscular re-education, Gait training, and Other (Comment). Dry needling if indicated          Visit Frequency: 2 times Per Week for 6 Weeks.       This plan was discussed with Patient.   Discussion participants: Agreed Upon Plan of Care             Goals:   Active       LTG       50% FOTO score improvement       Start:  02/04/25    Expected End:  04/01/25            Patient will be able to carry 50% without increasing pain       Start:  02/04/25    Expected End:  04/01/25            Pt ramon be able to stand > 8 hours for work withou pain       Start:  02/04/25    Expected End:  04/01/25               STG       Patient will show 50% reduction in lumbar spine stiffness       Start:  02/04/25    Expected End:  03/04/25            Pt will show 1/2 grade lateral hip MMT improvement       Start:  02/04/25    Expected End:  03/04/25            Patient will be able tosit > 30 minutes and rise without pain       Start:  02/04/25    Expected End:  03/04/25                Alethea Bender, PT, DPT

## 2025-02-11 ENCOUNTER — CLINICAL SUPPORT (OUTPATIENT)
Dept: REHABILITATION | Facility: HOSPITAL | Age: 65
End: 2025-02-11
Payer: MEDICARE

## 2025-02-11 DIAGNOSIS — M54.50 CHRONIC LOW BACK PAIN WITHOUT SCIATICA, UNSPECIFIED BACK PAIN LATERALITY: Primary | Chronic | ICD-10-CM

## 2025-02-11 DIAGNOSIS — M54.16 LUMBAR RADICULOPATHY: ICD-10-CM

## 2025-02-11 DIAGNOSIS — G89.29 CHRONIC LOW BACK PAIN WITHOUT SCIATICA, UNSPECIFIED BACK PAIN LATERALITY: Primary | Chronic | ICD-10-CM

## 2025-02-11 PROCEDURE — 97140 MANUAL THERAPY 1/> REGIONS: CPT | Mod: PO | Performed by: PHYSICAL THERAPIST

## 2025-02-11 PROCEDURE — 97112 NEUROMUSCULAR REEDUCATION: CPT | Mod: PO | Performed by: PHYSICAL THERAPIST

## 2025-02-11 PROCEDURE — 97110 THERAPEUTIC EXERCISES: CPT | Mod: PO | Performed by: PHYSICAL THERAPIST

## 2025-02-11 NOTE — PROGRESS NOTES
Outpatient Rehab    Physical Therapy Visit    Patient Name: Reji Gates  MRN: [id number]  YOB: 1960  Today's Date: 2/11/2025    Therapy Diagnosis:   Encounter Diagnoses   Name Primary?    LBP (low back pain) Yes    Lumbar radiculopathy      Physician: Bean Acuña MD    Physician Orders: Eval and Treat  Medical Diagnosis: M54.16 (ICD-10-CM) - Lumbar radiculopathy    Visit # / Visits Authorized:  1 / 20 + 3val   Date of Evaluation:  2/4/2025   Insurance Authorization Period: 2/4/2025 to 12/31/2025  Plan of Care Certification:  2/4/2025 to 5/4/2025      Time In: 0700   Time Out: 0755  Total Time: 55   Total Billable Time: 55    FOTO:  Intake Score:  %  Survey Score 1:  %  Survey Score 2:  %         Subjective   he was sitting in the car a lot and was feeling soreness in the lower back, has not been consistent with HEP.  Pain reported as 2/10.      Objective            Treatment:  Therapeutic Exercise  Therapeutic Exercise Activity 1: BKFO GTB X 20  Therapeutic Exercise Activity 2: Bridge with Band GTB X 30 each side  Therapeutic Exercise Activity 3: LTR X 20 with add ball  Therapeutic Exercise Activity 5: clamshell X 20 each side YTB  Therapeutic Exercise Activity 6: open books X 20 each side - inc discomfort on right lower lumbar with left side rotation         Balance/Neuromuscular Re-Education  Balance/Neuromuscular Re-Education Activity 1: PPT 20 X 3 sec hold with add ball  Balance/Neuromuscular Re-Education Activity 2: standing press into Ball for TrA engagement 20 X 3 sec hold  Balance/Neuromuscular Re-Education Activity 3: standing TrA press with ABD in standing position 2X10 each side              Assessment & Plan   Assessment: Reji did very well with initial treatment session today focusing on dynamic core and lateral hip muscle engagment. He had challenges in a standing position and we will continue to focus on this, as he is on his feel for at least 8 hours for his shifts.  Progressing well and cont to strengthen as able       Patient will continue to benefit from skilled outpatient physical therapy to address the deficits listed in the problem list box on initial evaluation, provide pt/family education and to maximize pt's level of independence in the home and community environment.     Patient's spiritual, cultural, and educational needs considered and patient agreeable to plan of care and goals.           Plan:      Goals:   Active       LTG       50% FOTO score improvement (Progressing)       Start:  02/04/25    Expected End:  04/01/25            Patient will be able to carry 50% without increasing pain (Progressing)       Start:  02/04/25    Expected End:  04/01/25            Pt ramon be able to stand > 8 hours for work withou pain (Progressing)       Start:  02/04/25    Expected End:  04/01/25               STG       Patient will show 50% reduction in lumbar spine stiffness (Progressing)       Start:  02/04/25    Expected End:  03/04/25            Pt will show 1/2 grade lateral hip MMT improvement (Progressing)       Start:  02/04/25    Expected End:  03/04/25            Patient will be able tosit > 30 minutes and rise without pain (Progressing)       Start:  02/04/25    Expected End:  03/04/25                Alethea Bender, PT, DPT

## 2025-02-14 ENCOUNTER — CLINICAL SUPPORT (OUTPATIENT)
Dept: REHABILITATION | Facility: HOSPITAL | Age: 65
End: 2025-02-14
Payer: MEDICARE

## 2025-02-14 DIAGNOSIS — M54.50 CHRONIC LOW BACK PAIN WITHOUT SCIATICA, UNSPECIFIED BACK PAIN LATERALITY: Primary | Chronic | ICD-10-CM

## 2025-02-14 DIAGNOSIS — M54.16 LUMBAR RADICULOPATHY: ICD-10-CM

## 2025-02-14 DIAGNOSIS — G89.29 CHRONIC LOW BACK PAIN WITHOUT SCIATICA, UNSPECIFIED BACK PAIN LATERALITY: Primary | Chronic | ICD-10-CM

## 2025-02-14 PROCEDURE — 97112 NEUROMUSCULAR REEDUCATION: CPT | Mod: PO | Performed by: PHYSICAL THERAPIST

## 2025-02-14 PROCEDURE — 97140 MANUAL THERAPY 1/> REGIONS: CPT | Mod: PO | Performed by: PHYSICAL THERAPIST

## 2025-02-14 PROCEDURE — 97110 THERAPEUTIC EXERCISES: CPT | Mod: PO | Performed by: PHYSICAL THERAPIST

## 2025-02-14 NOTE — PROGRESS NOTES
Outpatient Rehab    Physical Therapy Visit    Patient Name: Reji Gates  MRN: [id number]  YOB: 1960  Today's Date: 2/14/2025    Therapy Diagnosis:   Encounter Diagnoses   Name Primary?    LBP (low back pain) Yes    Lumbar radiculopathy      Physician: Bean Acuña MD    Physician Orders: Eval and Treat  Medical Diagnosis: LBP    Visit # / Visits Authorized:  3/20  Date of Evaluation:  2/4/2025   Insurance Authorization Period: 2/4/2025 to 12/31/2025  Plan of Care Certification:  2/4/2025 to 5/4/2025     Time In: 0900   Time Out: 0950  Total Time: 50   Total Billable Time: 50    FOTO:  Intake Score:  %  Survey Score 1:  %  Survey Score 2:  %         Subjective   things are going well and he is feeling really good today, he didnt take his gabapentin the day before last session and he is feeling better today since he is back on it.  Pain reported as 1/10.      Objective            Treatment:  Therapeutic Exercise  Therapeutic Exercise Activity 1: BKFO GTB X 20  Therapeutic Exercise Activity 2: Bridge with Band GTB X 30 each side  Therapeutic Exercise Activity 3: LTR X 20 with add ball  Therapeutic Exercise Activity 4: reverse clamshell no resist X 20  Therapeutic Exercise Activity 5: clamshell X 20 each side YTB  Therapeutic Exercise Activity 6: open books X 20 each side - inc discomfort on right lower lumbar with left side rotation    Manual Therapy  Manual Therapy Activity 1: STM to bilateral hip musculature - GM, pirifromis  Manual Therapy Activity 2: CFM SI ligament  Manual Therapy Activity 3: post innominate rotation    Balance/Neuromuscular Re-Education  Balance/Neuromuscular Re-Education Activity 1: PPT 20 X 3 sec hold with add ball  Balance/Neuromuscular Re-Education Activity 2: standing press into Ball for TrA engagement 20 X 3 sec hold  Balance/Neuromuscular Re-Education Activity 3: standing TrA press with ABD in standing position 2X10 each side              Assessment & Plan    Assessment:         Patient will continue to benefit from skilled outpatient physical therapy to address the deficits listed in the problem list box on initial evaluation, provide pt/family education and to maximize pt's level of independence in the home and community environment.     Patient's spiritual, cultural, and educational needs considered and patient agreeable to plan of care and goals.           Plan:      Goals:   Active       LTG       50% FOTO score improvement (Progressing)       Start:  02/04/25    Expected End:  04/01/25            Patient will be able to carry 50% without increasing pain (Progressing)       Start:  02/04/25    Expected End:  04/01/25            Pt ramon be able to stand > 8 hours for work withou pain (Progressing)       Start:  02/04/25    Expected End:  04/01/25               STG       Patient will show 50% reduction in lumbar spine stiffness (Progressing)       Start:  02/04/25    Expected End:  03/04/25            Pt will show 1/2 grade lateral hip MMT improvement (Progressing)       Start:  02/04/25    Expected End:  03/04/25            Patient will be able tosit > 30 minutes and rise without pain (Progressing)       Start:  02/04/25    Expected End:  03/04/25                Alethea Bender, PT, DPT

## 2025-02-18 ENCOUNTER — CLINICAL SUPPORT (OUTPATIENT)
Dept: REHABILITATION | Facility: HOSPITAL | Age: 65
End: 2025-02-18
Payer: MEDICARE

## 2025-02-18 DIAGNOSIS — G89.29 CHRONIC LOW BACK PAIN WITHOUT SCIATICA, UNSPECIFIED BACK PAIN LATERALITY: Primary | Chronic | ICD-10-CM

## 2025-02-18 DIAGNOSIS — M54.16 LUMBAR RADICULOPATHY: ICD-10-CM

## 2025-02-18 DIAGNOSIS — M54.50 CHRONIC LOW BACK PAIN WITHOUT SCIATICA, UNSPECIFIED BACK PAIN LATERALITY: Primary | Chronic | ICD-10-CM

## 2025-02-18 PROCEDURE — 97112 NEUROMUSCULAR REEDUCATION: CPT | Mod: PO | Performed by: PHYSICAL THERAPIST

## 2025-02-18 PROCEDURE — 97110 THERAPEUTIC EXERCISES: CPT | Mod: PO | Performed by: PHYSICAL THERAPIST

## 2025-02-18 NOTE — PROGRESS NOTES
Outpatient Rehab    Physical Therapy Visit    Patient Name: Reji Gates  MRN: [id number]  YOB: 1960  Today's Date: 2/18/2025    Therapy Diagnosis: No diagnosis found.  Physician: Bean Acuña MD    Physician Orders: Eval and Treat  Medical Diagnosis: LBP     Visit # / Visits Authorized:  4/20  Date of Evaluation:  2/4/2025   Insurance Authorization Period: 2/4/2025 to 12/31/2025  Plan of Care Certification:  2/4/2025 to 5/4/2025      Time In: 0700   Time Out: 0750  Total Time: 50   Total Billable Time: 50    FOTO:  Intake Score:  %  Survey Score 1:  %  Survey Score 2:  %         Subjective   still going well, the busy weekend did well and no big issues.  Pain reported as 1/10.      Objective            Treatment:  Therapeutic Exercise  Therapeutic Exercise Activity 1: BKFO GTB X 20  Therapeutic Exercise Activity 2: Bridge with Band GTB X 30 each side  Therapeutic Exercise Activity 3: LTR X 20 with add ball  Therapeutic Exercise Activity 4: reverse clamshell no resist X 20  Therapeutic Exercise Activity 5: clamshell X 20 each side RTB  Therapeutic Exercise Activity 6: open books X 20 each side - inc discomfort on right lower lumbar with left side rotation - reduced today         Balance/Neuromuscular Re-Education  Balance/Neuromuscular Re-Education Activity 1: PPT 20 X 3 sec hold with add ball  Balance/Neuromuscular Re-Education Activity 2: standing press into Ball for TrA engagement 20 X 3 sec hold  Balance/Neuromuscular Re-Education Activity 3: standing TrA press with ABD in standing position 2X10 each side  Balance/Neuromuscular Re-Education Activity 4: standing TrA press with extension in standing position 2 X 10  Balance/Neuromuscular Re-Education Activity 5: pallof press RTB X 20              Assessment & Plan   Assessment: Reji continues to respond well to core strengthening activity and tolerated new standing ex well today. Progressing well overall and showing very good  carryover.  Evaluation/Treatment Tolerance: Patient tolerated treatment well    Patient will continue to benefit from skilled outpatient physical therapy to address the deficits listed in the problem list box on initial evaluation, provide pt/family education and to maximize pt's level of independence in the home and community environment.     Patient's spiritual, cultural, and educational needs considered and patient agreeable to plan of care and goals.           Plan:      Goals:   Active       LTG       50% FOTO score improvement (Progressing)       Start:  02/04/25    Expected End:  04/01/25            Patient will be able to carry 50% without increasing pain (Progressing)       Start:  02/04/25    Expected End:  04/01/25            Pt ramon be able to stand > 8 hours for work withou pain (Progressing)       Start:  02/04/25    Expected End:  04/01/25               STG       Patient will show 50% reduction in lumbar spine stiffness (Progressing)       Start:  02/04/25    Expected End:  03/04/25            Pt will show 1/2 grade lateral hip MMT improvement (Progressing)       Start:  02/04/25    Expected End:  03/04/25            Patient will be able tosit > 30 minutes and rise without pain (Progressing)       Start:  02/04/25    Expected End:  03/04/25                Alethea Bender, PT, DPT

## 2025-02-20 NOTE — PROGRESS NOTES
Outpatient Rehab    Physical Therapy Visit    Patient Name: Reji Gates  MRN: [id number]  YOB: 1960  Today's Date: 2/21/2025    Therapy Diagnosis:   Encounter Diagnosis   Name Primary?    Lumbar radiculopathy Yes     Physician: Bean Acuña MD    Physician Orders: Eval and Treat  Medical Diagnosis: LBP     Visit # / Visits Authorized:  5/20  Date of Evaluation:  2/4/2025   Insurance Authorization Period: 2/4/2025 to 12/31/2025  Plan of Care Certification:  2/4/2025 to 5/4/2025    Time In: 0800   Time Out: 0846  Total Time: 46   Total Billable Time: 23 min 1:1     FOTO:  Intake Score:  %  Survey Score 1:  %  Survey Score 2:  %         Subjective   has pain down the leg when sitting, more than 30 min or an hour. Other than that he has been feeling good. Less pain in the back, but he is still taking taking gabapentin..  Pain reported as 1/10.      Objective            Treatment:            Balance/Neuromuscular Re-Education  Balance/Neuromuscular Re-Education Activity 1: PPT 20 X 3 sec hold with add ball  Balance/Neuromuscular Re-Education Activity 2: standing press into Ball for TrA engagement 2x10 X 5 sec hold  Balance/Neuromuscular Re-Education Activity 3: standing TrA press with ABD in standing position 2X10 each side  Balance/Neuromuscular Re-Education Activity 4: standing TrA press with extension in standing position 2 X 10  Balance/Neuromuscular Re-Education Activity 5: pallof press RTB X 20  Balance/Neuromuscular Re-Education Activity 6: BKFO GTB 2x10  Balance/Neuromuscular Re-Education Activity 7: Bridge GTB 2x10  Balance/Neuromuscular Re-Education Activity 8: +90/90 heel tap 4x5  Balance/Neuromuscular Re-Education Activity 9: +dead bugs 4x5  Balance/Neuromuscular Re-Education Activity 10: +palloff press with lift RTB x20         Assessment & Plan   Assessment: Patient tolerated treatment well. Progressed core engagement activity with dead bugs and heel taps. He is appropriately  challenged with progressions. Doing well overall and showing good carryover.  Evaluation/Treatment Tolerance: Patient tolerated treatment well    Patient will continue to benefit from skilled outpatient physical therapy to address the deficits listed in the problem list box on initial evaluation, provide pt/family education and to maximize pt's level of independence in the home and community environment.     Patient's spiritual, cultural, and educational needs considered and patient agreeable to plan of care and goals.           Plan: Continue with established plan of care.    Goals:   Active       LTG       50% FOTO score improvement (Progressing)       Start:  02/04/25    Expected End:  04/01/25            Patient will be able to carry 50% without increasing pain (Progressing)       Start:  02/04/25    Expected End:  04/01/25            Pt ramon be able to stand > 8 hours for work withou pain (Progressing)       Start:  02/04/25    Expected End:  04/01/25               STG       Patient will show 50% reduction in lumbar spine stiffness (Progressing)       Start:  02/04/25    Expected End:  03/04/25            Pt will show 1/2 grade lateral hip MMT improvement (Progressing)       Start:  02/04/25    Expected End:  03/04/25            Patient will be able tosit > 30 minutes and rise without pain (Progressing)       Start:  02/04/25    Expected End:  03/04/25                Patricia Pennington, PT

## 2025-02-21 ENCOUNTER — CLINICAL SUPPORT (OUTPATIENT)
Dept: REHABILITATION | Facility: HOSPITAL | Age: 65
End: 2025-02-21
Payer: MEDICARE

## 2025-02-21 DIAGNOSIS — M54.16 LUMBAR RADICULOPATHY: Primary | ICD-10-CM

## 2025-02-21 PROCEDURE — 97112 NEUROMUSCULAR REEDUCATION: CPT | Mod: PO

## 2025-02-22 ENCOUNTER — OFFICE VISIT (OUTPATIENT)
Dept: URGENT CARE | Facility: CLINIC | Age: 65
End: 2025-02-22
Payer: MEDICARE

## 2025-02-22 VITALS
HEIGHT: 70 IN | RESPIRATION RATE: 18 BRPM | HEART RATE: 62 BPM | BODY MASS INDEX: 25.21 KG/M2 | TEMPERATURE: 98 F | OXYGEN SATURATION: 98 % | DIASTOLIC BLOOD PRESSURE: 75 MMHG | SYSTOLIC BLOOD PRESSURE: 121 MMHG | WEIGHT: 176.13 LBS

## 2025-02-22 DIAGNOSIS — H11.32 SUBCONJUNCTIVAL HEMORRHAGE OF LEFT EYE: Primary | ICD-10-CM

## 2025-02-22 PROCEDURE — 99213 OFFICE O/P EST LOW 20 MIN: CPT | Mod: S$GLB,,, | Performed by: NURSE PRACTITIONER

## 2025-02-22 NOTE — PATIENT INSTRUCTIONS
PLEASE READ YOUR DISCHARGE INSTRUCTIONS ENTIRELY AS IT CONTAINS IMPORTANT INFORMATION.     Keep hands clean.      Please return or see your primary care doctor if you develop new or worsening symptoms (vision changes, pain, fever, swelling around your eye).      Please arrange follow up with your primary medical clinic as soon as possible. You must understand that you've received an Urgent Care treatment only and that you may be released before all of your medical problems are known or treated. You, the patient, will arrange for follow up as instructed. If your symptoms worsen or fail to improve you should go to the Emergency Room.  WE CANNOT RULE OUT ALL POSSIBLE CAUSES OF YOUR SYMPTOMS IN THE URGENT CARE SETTING PLEASE GO TO THE ER IF YOU FEELS YOUR CONDITION IS WORSENING OR YOU WOULD LIKE EMERGENT EVALUATION.

## 2025-02-22 NOTE — PROGRESS NOTES
"Subjective:      Patient ID: Reji Gates is a 64 y.o. male.    Vitals:  height is 5' 10" (1.778 m) and weight is 79.9 kg (176 lb 2.4 oz). His tympanic temperature is 98.1 °F (36.7 °C). His blood pressure is 121/75 and his pulse is 62. His respiration is 18 and oxygen saturation is 98%.     Chief Complaint: Other Misc (Conjunctivitis possibly pink eyes - Entered by patient) and Eye Problem    Sx started on Thursday with left eye irration. Pt states it then progressed to eye redness   Provider note below:  This is a 64 y.o. male who presents today with a chief complaint of left eye irritation and redness that he noticed on Thursday, patient reports he is unsure if he had this redness in his left eye already but he used 1 of the eye patches over-the-counter that provides warmness and after that he noticed more redness in his left eye, denies any blurred vision or change in vision, reports irritation to left eye, denies any drainage or discharge, denies any eye pain, denies any eye pain with movement,denies fever, body aches or chills, denies cough, wheezing or shortness of breath, denies nausea, vomiting, diarrhea or abdominal pain, denies chest pain or dizziness positional lightheadedness, denies sore throat or trouble swallowing, denies loss of taste or smell, or any other symptoms       Eye Problem   The left eye is affected. This is a new problem. The current episode started in the past 7 days. The problem occurs constantly. The problem has been gradually worsening. There was no injury mechanism. The pain is at a severity of 1/10. The patient is experiencing no pain. There is No known exposure to pink eye. He Does not wear contacts. Associated symptoms include eye redness and itching. Pertinent negatives include no blurred vision, eye discharge, double vision, foreign body sensation or photophobia. He has tried eye drops (eye lubricant) for the symptoms. The treatment provided no relief.       Eyes:  " Positive for eye itching and eye redness. Negative for eye trauma, foreign body in eye, eye discharge, eye pain, photophobia, vision loss, double vision, blurred vision and eyelid swelling.      Objective:     Physical Exam   Constitutional: He is oriented to person, place, and time. He appears well-developed.   HENT:   Head: Normocephalic and atraumatic.   Ears:   Right Ear: External ear normal.   Left Ear: External ear normal.   Nose: Nose normal.   Mouth/Throat: Oropharynx is clear and moist.   Eyes: EOM and lids are normal. Pupils are equal, round, and reactive to light. Left eye exhibits no discharge and no hordeolum. No foreign body present in the left eye. Left conjunctiva has a hemorrhage (subconjuctival). Extraocular movement intact vision grossly intact gaze aligned appropriately   Neck: Trachea normal and phonation normal. Neck supple.   Musculoskeletal: Normal range of motion.         General: Normal range of motion.   Neurological: He is alert and oriented to person, place, and time.   Skin: Skin is warm, dry and intact.   Psychiatric: His speech is normal and behavior is normal. Judgment and thought content normal.   Nursing note and vitals reviewed.    Vision Screening    Right eye Left eye Both eyes   Without correction      With correction 20/20 20/40 20/15         Patient in no acute distress.  Vitals reassuring.  Discussed results/diagnosis/plan in depth with patient in clinic. Strict precautions given to patient to monitor for worsening signs and symptoms. Advised to follow up with primary.All questions answered. Strict ER precautions given. If your symptoms worsens or fail to improve you should go to the Emergency Room. Discharge and follow-up instructions given verbally/printed. Discharge and follow-up instructions discussed with the patient who expressed understanding and willingness to comply with my recommendations.Patient voiced understanding and in agreement with current treatment plan.      Please be advised this text was dictated with [employer] software and may contain errors due to translation.   Assessment:     1. Subconjunctival hemorrhage of left eye        Plan:       Subconjunctival hemorrhage of left eye          Medical Decision Making:   Urgent Care Management:  Patient in no acute distress.  Vitals reassuring.  On exam, patient is nontoxic appearing and afebrile.  Lungs CTA.  Physical examination consistent with subconjunctival hemorrhage of left eye.  No drainage or discharge noted.  No eye pain no eye pain with movement.  Extraocular movement intact.  Reassurance provided and advised patient to follow up with the ophthalmologist.  over-the-counter medication discussed with patient at length.  Proper hydration advised.  I reiterated the importance of further evaluation if no improvement symptoms and follow-up with primary. Patient voiced understanding and in agreement with current treatment plan.             Patient Instructions   PLEASE READ YOUR DISCHARGE INSTRUCTIONS ENTIRELY AS IT CONTAINS IMPORTANT INFORMATION.     Keep hands clean.      Please return or see your primary care doctor if you develop new or worsening symptoms (vision changes, pain, fever, swelling around your eye).      Please arrange follow up with your primary medical clinic as soon as possible. You must understand that you've received an Urgent Care treatment only and that you may be released before all of your medical problems are known or treated. You, the patient, will arrange for follow up as instructed. If your symptoms worsen or fail to improve you should go to the Emergency Room.  WE CANNOT RULE OUT ALL POSSIBLE CAUSES OF YOUR SYMPTOMS IN THE URGENT CARE SETTING PLEASE GO TO THE ER IF YOU FEELS YOUR CONDITION IS WORSENING OR YOU WOULD LIKE EMERGENT EVALUATION.

## 2025-02-25 ENCOUNTER — CLINICAL SUPPORT (OUTPATIENT)
Dept: REHABILITATION | Facility: HOSPITAL | Age: 65
End: 2025-02-25
Payer: MEDICARE

## 2025-02-25 DIAGNOSIS — G89.29 CHRONIC LOW BACK PAIN WITHOUT SCIATICA, UNSPECIFIED BACK PAIN LATERALITY: Primary | Chronic | ICD-10-CM

## 2025-02-25 DIAGNOSIS — M54.16 LUMBAR RADICULOPATHY: ICD-10-CM

## 2025-02-25 DIAGNOSIS — M54.50 CHRONIC LOW BACK PAIN WITHOUT SCIATICA, UNSPECIFIED BACK PAIN LATERALITY: Primary | Chronic | ICD-10-CM

## 2025-02-25 PROCEDURE — 97110 THERAPEUTIC EXERCISES: CPT | Mod: PO | Performed by: PHYSICAL THERAPIST

## 2025-02-25 PROCEDURE — 97112 NEUROMUSCULAR REEDUCATION: CPT | Mod: PO | Performed by: PHYSICAL THERAPIST

## 2025-02-25 NOTE — PROGRESS NOTES
Outpatient Rehab    Physical Therapy Visit    Patient Name: Reji Gates  MRN: [id number]  YOB: 1960  Encounter Date: 2/25/2025    Therapy Diagnosis:   Encounter Diagnoses   Name Primary?    LBP (low back pain) Yes    Lumbar radiculopathy      Physician: Bean Acuña MD    Physician Orders: Eval and Treat  Medical Diagnosis: LBP     Visit # / Visits Authorized:  6/20  Date of Evaluation:  2/4/2025   Insurance Authorization Period: 2/4/2025 to 12/31/2025  Plan of Care Certification:  2/4/2025 to 5/4/2025      Time In: 0700   Time Out: 0755  Total Time: 55   Total Billable Time: 50    FOTO:  Intake Score:  %  Survey Score 1:  %  Survey Score 2:  %         Subjective   he has been feeling pretty good, still just when sitting long periods of time.  Pain reported as 1/10.      Objective            Treatment:  Therapeutic Exercise  Therapeutic Exercise Activity 3: LTR X 20 with add ball  Therapeutic Exercise Activity 4: reverse clamshell no resist X 20  Therapeutic Exercise Activity 5: clamshell X 20 each side RTB  Therapeutic Exercise Activity 6: open books X 20 each side - inc discomfort on right lower lumbar with left side rotation - reduced today         Balance/Neuromuscular Re-Education  Balance/Neuromuscular Re-Education Activity 1: PPT 20 X 3 sec hold with add ball  Balance/Neuromuscular Re-Education Activity 2: standing press into Ball for TrA engagement 2x10 X 5 sec hold  Balance/Neuromuscular Re-Education Activity 3: standing TrA press with ABD in standing position 2X10 each side  Balance/Neuromuscular Re-Education Activity 4: standing TrA press with extension in standing position 2 X 10  Balance/Neuromuscular Re-Education Activity 6: BKFO GTB 2x10  Balance/Neuromuscular Re-Education Activity 7: Bridge GTB 2x10  Balance/Neuromuscular Re-Education Activity 8: +90/90 heel tap 4x5  Balance/Neuromuscular Re-Education Activity 9: dead bugs 2 X 10 with ball press today               Assessment & Plan   Assessment: Pt did well with activity today and was able to hold standing position better before losing core control. Progressning well with functional ability and showing excellent carryover  Evaluation/Treatment Tolerance: Patient tolerated treatment well    Patient will continue to benefit from skilled outpatient physical therapy to address the deficits listed in the problem list box on initial evaluation, provide pt/family education and to maximize pt's level of independence in the home and community environment.     Patient's spiritual, cultural, and educational needs considered and patient agreeable to plan of care and goals.           Plan:      Goals:   Active       LTG       50% FOTO score improvement (Progressing)       Start:  02/04/25    Expected End:  04/01/25            Patient will be able to carry 50% without increasing pain (Progressing)       Start:  02/04/25    Expected End:  04/01/25            Pt ramon be able to stand > 8 hours for work withou pain (Progressing)       Start:  02/04/25    Expected End:  04/01/25               STG       Patient will show 50% reduction in lumbar spine stiffness (Progressing)       Start:  02/04/25    Expected End:  03/04/25            Pt will show 1/2 grade lateral hip MMT improvement (Progressing)       Start:  02/04/25    Expected End:  03/04/25            Patient will be able tosit > 30 minutes and rise without pain (Progressing)       Start:  02/04/25    Expected End:  03/04/25                Alethea Bender, PT, DPT

## 2025-02-28 ENCOUNTER — CLINICAL SUPPORT (OUTPATIENT)
Dept: REHABILITATION | Facility: HOSPITAL | Age: 65
End: 2025-02-28
Payer: MEDICARE

## 2025-02-28 DIAGNOSIS — M54.16 LUMBAR RADICULOPATHY: ICD-10-CM

## 2025-02-28 DIAGNOSIS — M54.50 CHRONIC LOW BACK PAIN WITHOUT SCIATICA, UNSPECIFIED BACK PAIN LATERALITY: Primary | Chronic | ICD-10-CM

## 2025-02-28 DIAGNOSIS — G89.29 CHRONIC LOW BACK PAIN WITHOUT SCIATICA, UNSPECIFIED BACK PAIN LATERALITY: Primary | Chronic | ICD-10-CM

## 2025-02-28 PROCEDURE — 97112 NEUROMUSCULAR REEDUCATION: CPT | Mod: PO | Performed by: PHYSICAL THERAPIST

## 2025-02-28 PROCEDURE — 97110 THERAPEUTIC EXERCISES: CPT | Mod: PO | Performed by: PHYSICAL THERAPIST

## 2025-02-28 NOTE — PROGRESS NOTES
Outpatient Rehab    Physical Therapy Visit    Patient Name: Reji Gates  MRN: [id number]  YOB: 1960  Encounter Date: 2/28/2025    Therapy Diagnosis:   Encounter Diagnoses   Name Primary?    LBP (low back pain) Yes    Lumbar radiculopathy      Physician: Bean Acuña MD    Physician Orders: Eval and Treat  Medical Diagnosis: LBP     Visit # / Visits Authorized:  7/20  Date of Evaluation:  2/4/2025   Insurance Authorization Period: 2/4/2025 to 12/31/2025  Plan of Care Certification:  2/4/2025 to 5/4/2025     Time In: 0905   Time Out: 0955  Total Time: 50   Total Billable Time: 40 min 1:1    FOTO:  Intake Score:  %  Survey Score 1:  %  Survey Score 2:  %         Subjective   things are holding up well this week, has a few days off coming up and will do well with the rest.  Pain reported as 1/10.      Objective            Treatment:  Therapeutic Exercise  Therapeutic Exercise Activity 1: BKFO GTB X 20  Therapeutic Exercise Activity 2: Bridge with Band GTB X 30 each side  Therapeutic Exercise Activity 3: LTR X 20 with add ball  Therapeutic Exercise Activity 4: reverse clamshell YTB X 20  Therapeutic Exercise Activity 5: clamshell X 20 each side GTB  Therapeutic Exercise Activity 6: open books X 20 each side -no issues         Balance/Neuromuscular Re-Education  Balance/Neuromuscular Re-Education Activity 1: PPT 20 X 3 sec hold with add ball  Balance/Neuromuscular Re-Education Activity 2: standing press into Ball for TrA engagement 2x10 X 5 sec hold  Balance/Neuromuscular Re-Education Activity 3: standing TrA press with ABD in standing position 2X10 each side  Balance/Neuromuscular Re-Education Activity 4: standing TrA press with extension in standing position 2 X 10  Balance/Neuromuscular Re-Education Activity 5: pallof press RTB X 20  Balance/Neuromuscular Re-Education Activity 6: BKFO GTB 2x10  Balance/Neuromuscular Re-Education Activity 7: Bridge GTB 2x10  Balance/Neuromuscular  Re-Education Activity 8: +90/90 heel tap 4x5  Balance/Neuromuscular Re-Education Activity 9: dead bugs 2 X 10 with ball press today  Balance/Neuromuscular Re-Education Activity 10: +palloff press with lift RTB x20              Assessment & Plan   Assessment: Reji continues to show good improvement in his dynamic core stability and control with activity. He is showing excellent carryover and is able to perform more activity before onset of pain and tightness in the spinje  Evaluation/Treatment Tolerance: Patient tolerated treatment well    Patient will continue to benefit from skilled outpatient physical therapy to address the deficits listed in the problem list box on initial evaluation, provide pt/family education and to maximize pt's level of independence in the home and community environment.     Patient's spiritual, cultural, and educational needs considered and patient agreeable to plan of care and goals.           Plan:      Goals:   Active       LTG       50% FOTO score improvement (Progressing)       Start:  02/04/25    Expected End:  04/01/25            Patient will be able to carry 50% without increasing pain (Progressing)       Start:  02/04/25    Expected End:  04/01/25            Pt ramon be able to stand > 8 hours for work withou pain (Progressing)       Start:  02/04/25    Expected End:  04/01/25               STG       Patient will show 50% reduction in lumbar spine stiffness (Progressing)       Start:  02/04/25    Expected End:  03/04/25            Pt will show 1/2 grade lateral hip MMT improvement (Progressing)       Start:  02/04/25    Expected End:  03/04/25            Patient will be able tosit > 30 minutes and rise without pain (Progressing)       Start:  02/04/25    Expected End:  03/04/25                Alethea Bender, PT, DPT

## 2025-03-05 ENCOUNTER — CLINICAL SUPPORT (OUTPATIENT)
Dept: REHABILITATION | Facility: HOSPITAL | Age: 65
End: 2025-03-05
Payer: MEDICARE

## 2025-03-05 DIAGNOSIS — M54.50 CHRONIC LOW BACK PAIN WITHOUT SCIATICA, UNSPECIFIED BACK PAIN LATERALITY: Primary | Chronic | ICD-10-CM

## 2025-03-05 DIAGNOSIS — M54.16 LUMBAR RADICULOPATHY: ICD-10-CM

## 2025-03-05 DIAGNOSIS — G89.29 CHRONIC LOW BACK PAIN WITHOUT SCIATICA, UNSPECIFIED BACK PAIN LATERALITY: Primary | Chronic | ICD-10-CM

## 2025-03-05 PROCEDURE — 97110 THERAPEUTIC EXERCISES: CPT | Mod: PO | Performed by: PHYSICAL THERAPIST

## 2025-03-05 PROCEDURE — 97112 NEUROMUSCULAR REEDUCATION: CPT | Mod: PO | Performed by: PHYSICAL THERAPIST

## 2025-03-05 NOTE — PROGRESS NOTES
Outpatient Rehab    Physical Therapy Progress Note    Patient Name: Reji Gates  MRN: [id number]  YOB: 1960  Encounter Date: 3/5/2025    Therapy Diagnosis:   Encounter Diagnoses   Name Primary?    LBP (low back pain) Yes    Lumbar radiculopathy      Physician: Bean Acuña MD    Physician Orders: Eval and Treat  Medical Diagnosis: LBP     Visit # / Visits Authorized:  7/20  Date of Evaluation:  2/4/2025   Insurance Authorization Period: 2/4/2025 to 12/31/2025  Plan of Care Certification:  2/4/2025 to 5/4/2025      Time In: 0800   Time Out: 0803  Total Time: 3   Total Billable Time: 25 min 1:1    FOTO:  Intake Score:  %  Survey Score 1:  %  Survey Score 2:  %         Subjective   didnt do much over the weekend but is still having between 1-2/10 pain into the lowe rback and hip complex..  Pain reported as 1/10.      Objective      Lumbar Range of Motion   Active (deg) Passive (deg) Pain   Flexion 80       Extension 50       Right Lateral Flexion 75       Right Rotation 75       Left Lateral Flexion 75       Left Rotation 75                       Treatment:  Therapeutic Exercise  Therapeutic Exercise Activity 1: BKFO GTB X 20  Therapeutic Exercise Activity 2: Bridge with Band GTB X 30 each side  Therapeutic Exercise Activity 4: reverse clamshell YTB X 20  Therapeutic Exercise Activity 5: clamshell X 20 each side GTB  Therapeutic Exercise Activity 6: open books X 20 each side -no issues         Balance/Neuromuscular Re-Education  Balance/Neuromuscular Re-Education Activity 2: standing press into Ball for TrA engagement 2x10 X 5 sec hold  Balance/Neuromuscular Re-Education Activity 3: standing TrA press with ABD in standing position 2X10 each side  Balance/Neuromuscular Re-Education Activity 4: standing TrA press with extension in standing position 2 X 10  Balance/Neuromuscular Re-Education Activity 5: pallof press RTB X 20  Balance/Neuromuscular Re-Education Activity 6: BKFO GTB  2x10  Balance/Neuromuscular Re-Education Activity 7: Bridge GTB 2x10  Balance/Neuromuscular Re-Education Activity 8: +90/90 heel tap 4x5  Balance/Neuromuscular Re-Education Activity 9: dead bugs 2 X 10 with ball press today  Balance/Neuromuscular Re-Education Activity 10: +palloff press with lift RTB x20              Assessment & Plan   Assessment: Pt did well overall today and with reassessment he is showing good carryover and reports <2/10 pain at all times now. We discussed how there is arthritic changes in the spine and he will likely have discomfort and the fact that his pain is staying low is a great sign. progressing well       Patient will continue to benefit from skilled outpatient physical therapy to address the deficits listed in the problem list box on initial evaluation, provide pt/family education and to maximize pt's level of independence in the home and community environment.     Patient's spiritual, cultural, and educational needs considered and patient agreeable to plan of care and goals.           Plan:      Goals:   Active       LTG       50% FOTO score improvement (Progressing)       Start:  02/04/25    Expected End:  04/01/25            Patient will be able to carry 50% without increasing pain (Progressing)       Start:  02/04/25    Expected End:  04/01/25            Pt ramon be able to stand > 8 hours for work withou pain (Progressing)       Start:  02/04/25    Expected End:  04/01/25               STG       Patient will show 50% reduction in lumbar spine stiffness (Progressing)       Start:  02/04/25    Expected End:  03/18/25            Pt will show 1/2 grade lateral hip MMT improvement (Progressing)       Start:  02/04/25    Expected End:  03/18/25            Patient will be able tosit > 30 minutes and rise without pain (Progressing)       Start:  02/04/25    Expected End:  03/18/25                Alethea Bender, PT, DPT

## 2025-03-07 ENCOUNTER — TELEPHONE (OUTPATIENT)
Dept: CARDIOLOGY | Facility: CLINIC | Age: 65
End: 2025-03-07
Payer: MEDICARE

## 2025-03-07 ENCOUNTER — CLINICAL SUPPORT (OUTPATIENT)
Dept: REHABILITATION | Facility: HOSPITAL | Age: 65
End: 2025-03-07
Payer: MEDICARE

## 2025-03-07 DIAGNOSIS — R79.9 ABNORMAL FINDING OF BLOOD CHEMISTRY, UNSPECIFIED: ICD-10-CM

## 2025-03-07 DIAGNOSIS — M54.16 LUMBAR RADICULOPATHY: ICD-10-CM

## 2025-03-07 DIAGNOSIS — M79.89 LEG SWELLING: Primary | ICD-10-CM

## 2025-03-07 DIAGNOSIS — G89.29 CHRONIC LOW BACK PAIN WITHOUT SCIATICA, UNSPECIFIED BACK PAIN LATERALITY: Primary | Chronic | ICD-10-CM

## 2025-03-07 DIAGNOSIS — M54.50 CHRONIC LOW BACK PAIN WITHOUT SCIATICA, UNSPECIFIED BACK PAIN LATERALITY: Primary | Chronic | ICD-10-CM

## 2025-03-07 PROCEDURE — 97112 NEUROMUSCULAR REEDUCATION: CPT | Mod: KX,PO | Performed by: PHYSICAL THERAPIST

## 2025-03-07 NOTE — PROGRESS NOTES
Outpatient Rehab    Physical Therapy Visit    Patient Name: Reji Gates  MRN: [id number]  YOB: 1960  Encounter Date: 3/7/2025    Therapy Diagnosis:   Encounter Diagnoses   Name Primary?    LBP (low back pain) Yes    Lumbar radiculopathy      Physician: Bean Acuña MD    Physician Orders: Eval and Treat  Medical Diagnosis: LBP     Visit # / Visits Authorized:  7/20  Date of Evaluation:  2/4/2025   Insurance Authorization Period: 2/4/2025 to 12/31/2025  Plan of Care Certification:  2/4/2025 to 5/4/2025      Time In: 0900   Time Out: 0955  Total Time: 55   Total Billable Time: 25 min 1:1    FOTO:  Intake Score:  %  Survey Score 1:  %  Survey Score 2:  %         Subjective   back to work this week and feels some right side tightness like it feels like the sciatic nerve is about to flare up.  Pain reported as 2/10.      Objective            Treatment:  Therapeutic Exercise  Therapeutic Exercise Activity 1: BKFO GTB X 20  Therapeutic Exercise Activity 2: Bridge with Band GTB X 30 each side  Therapeutic Exercise Activity 3: LTR X 20 with add ball  Therapeutic Exercise Activity 4: reverse clamshell YTB X 20  Therapeutic Exercise Activity 5: clamshell X 20 each side GTB  Therapeutic Exercise Activity 6: open books X 20 each side -no issues    Manual Therapy  Manual Therapy Activity 1: STM to R hip piriformis, GM and QL    Balance/Neuromuscular Re-Education  Balance/Neuromuscular Re-Education Activity 1: PPT 20 X 3 sec hold with add ball  Balance/Neuromuscular Re-Education Activity 2: standing press into Ball for TrA engagement 2x10 X 5 sec hold  Balance/Neuromuscular Re-Education Activity 3: standing TrA press with ABD in standing position 2X10 each side  Balance/Neuromuscular Re-Education Activity 4: standing TrA press with extension in standing position 2 X 10  Balance/Neuromuscular Re-Education Activity 5: pallof press RTB X 20  Balance/Neuromuscular Re-Education Activity 6: BKFO GTB  2x10  Balance/Neuromuscular Re-Education Activity 7: Bridge GTB 2x10  Balance/Neuromuscular Re-Education Activity 8: +90/90 heel tap 4x5  Balance/Neuromuscular Re-Education Activity 9: dead bugs 2 X 10 with ball press today  Balance/Neuromuscular Re-Education Activity 10: +palloff press with lift RTB x20         Assessment & Plan   Assessment: Pt continues to do well with progressions and reported a good reduction in tightness and soreness after MFT to the hip complex today. Progressing well with standing stability and showing good carryover  Evaluation/Treatment Tolerance: Patient tolerated treatment well    Patient will continue to benefit from skilled outpatient physical therapy to address the deficits listed in the problem list box on initial evaluation, provide pt/family education and to maximize pt's level of independence in the home and community environment.     Patient's spiritual, cultural, and educational needs considered and patient agreeable to plan of care and goals.           Plan: Continue with POC    Goals:   Active       LTG       50% FOTO score improvement (Progressing)       Start:  02/04/25    Expected End:  04/01/25            Patient will be able to carry 50% without increasing pain (Progressing)       Start:  02/04/25    Expected End:  04/01/25            Pt ramon be able to stand > 8 hours for work withou pain (Progressing)       Start:  02/04/25    Expected End:  04/01/25               STG       Patient will show 50% reduction in lumbar spine stiffness (Progressing)       Start:  02/04/25    Expected End:  03/18/25            Pt will show 1/2 grade lateral hip MMT improvement (Progressing)       Start:  02/04/25    Expected End:  03/18/25            Patient will be able tosit > 30 minutes and rise without pain (Progressing)       Start:  02/04/25    Expected End:  03/18/25                Alethea Bender, PT, DPT

## 2025-03-07 NOTE — TELEPHONE ENCOUNTER
----- Message from Jen sent at 3/7/2025 11:58 AM CST -----  Regarding: FW: Labs  [facility] didn't get them please resend and he can't go today because he has to go to work. If he goes Monday will you have the results before his visit Thursday?Thanks  ----- Message -----  From: Jen Patel  Sent: 3/7/2025  12:42 PM CST  To: Josh [facility] Staff  Subject: FW: Labs                                         [facility] didn't get them please resend and he can't go today because he has to go to work. If he goes Monday will you have the results before his visit Thursday?Thanks  ----- Message -----  From: Jen Patel  Sent: 3/7/2025  12:02 PM CST  To: Josh [facility] Staff  Subject: FW: Labs                                           ----- Message -----  From: Jen Patel  Sent: 3/7/2025  11:08 AM CST  To: Josh [facility] Staff  Subject: Labs                                             Pt [phone] would like to know what lab his labs were sent to? Please call him at the number listed.Thanks

## 2025-03-07 NOTE — TELEPHONE ENCOUNTER
----- Message from Jen sent at 3/7/2025 11:07 AM CST -----  Regarding: Labs  Pt [phone] would like to know what lab his labs were sent to? Please call him at the number listed.Thanks

## 2025-03-08 ENCOUNTER — LAB VISIT (OUTPATIENT)
Dept: LAB | Facility: HOSPITAL | Age: 65
End: 2025-03-08
Attending: INTERNAL MEDICINE
Payer: MEDICARE

## 2025-03-08 DIAGNOSIS — R79.9 ABNORMAL FINDING OF BLOOD CHEMISTRY, UNSPECIFIED: ICD-10-CM

## 2025-03-08 DIAGNOSIS — M79.89 LEG SWELLING: ICD-10-CM

## 2025-03-08 LAB
CHOLEST SERPL-MCNC: 149 MG/DL (ref 120–199)
CHOLEST/HDLC SERPL: 3.6 {RATIO} (ref 2–5)
HDLC SERPL-MCNC: 41 MG/DL (ref 40–75)
HDLC SERPL: 27.5 % (ref 20–50)
LDLC SERPL CALC-MCNC: 96 MG/DL (ref 63–159)
NONHDLC SERPL-MCNC: 108 MG/DL
TRIGL SERPL-MCNC: 60 MG/DL (ref 30–150)

## 2025-03-08 PROCEDURE — 80061 LIPID PANEL: CPT | Performed by: INTERNAL MEDICINE

## 2025-03-08 PROCEDURE — 36415 COLL VENOUS BLD VENIPUNCTURE: CPT | Performed by: INTERNAL MEDICINE

## 2025-03-10 NOTE — PROGRESS NOTES
Outpatient Rehab    Physical Therapy Visit    Patient Name: Reji Gates  MRN: [id number]  YOB: 1960  Encounter Date: 3/11/2025    Therapy Diagnosis:   Encounter Diagnosis   Name Primary?    Lumbar radiculopathy Yes     Physician: Bean Acuña MD    Physician Orders: Eval and Treat  Medical Diagnosis: LBP     Visit # / Visits Authorized:  10/20  Date of Evaluation:  2/4/2025   Insurance Authorization Period: 2/4/2025 to 12/31/2025  Plan of Care Certification:  2/4/2025 to 5/4/2025                    Time In: 0800   Time Out: 0850  Total Time: 50   Total Billable Time: 25 min 1:1     FOTO:  Intake Score:  %  Survey Score 1:  %  Survey Score 2:  %         Subjective   last night he had to drive an hour to [place] but by the end of the ride the pain came down to the top of the leg/buttock. Other than that, it comes and goes..  Pain reported as 1/10.      Objective            Treatment:  Therapeutic Exercise  Therapeutic Exercise Activity 1: BKFO GTB X 20- progressed to BTB  Therapeutic Exercise Activity 2: Bridge with Band GTB 2x10- progressed to Blue theraband  Therapeutic Exercise Activity 3: LTR X 20 with add ball  Therapeutic Exercise Activity 4: clamshell X 20 each side GTB- progressed to blue theraband    Balance/Neuromuscular Re-Education  Balance/Neuromuscular Re-Education Activity 1: PPT 20 X 3 sec hold with add ball  Balance/Neuromuscular Re-Education Activity 2: standing press into Ball for TrA engagement 2x10 X 5 sec hold  Balance/Neuromuscular Re-Education Activity 3: standing TrA press with ABD in standing position 2X10 each side  Balance/Neuromuscular Re-Education Activity 4: standing TrA press with extension in standing position 2 X 10  Balance/Neuromuscular Re-Education Activity 5: pallof press RTB X 20- progressed to green  Balance/Neuromuscular Re-Education Activity 6: Sciatic nerve glides x30 each leg    Assessment & Plan   Assessment: Reji does well with progression  of theraband resistance with activity and exercises. He has good carryover of current routine. Added seated sciatic nerve glides to address neural tension. Progress as tolerated.  Evaluation/Treatment Tolerance: Patient tolerated treatment well    Patient will continue to benefit from skilled outpatient physical therapy to address the deficits listed in the problem list box on initial evaluation, provide pt/family education and to maximize pt's level of independence in the home and community environment.     Patient's spiritual, cultural, and educational needs considered and patient agreeable to plan of care and goals.           Plan: Continue with POC    Goals:   Active       LTG       50% FOTO score improvement (Progressing)       Start:  02/04/25    Expected End:  04/01/25            Patient will be able to carry 50% without increasing pain (Progressing)       Start:  02/04/25    Expected End:  04/01/25            Pt ramon be able to stand > 8 hours for work withou pain (Progressing)       Start:  02/04/25    Expected End:  04/01/25               STG       Patient will show 50% reduction in lumbar spine stiffness (Progressing)       Start:  02/04/25    Expected End:  03/18/25            Pt will show 1/2 grade lateral hip MMT improvement (Progressing)       Start:  02/04/25    Expected End:  03/18/25            Patient will be able tosit > 30 minutes and rise without pain (Progressing)       Start:  02/04/25    Expected End:  03/18/25                Patricia Pennington, PT, DPT

## 2025-03-11 ENCOUNTER — CLINICAL SUPPORT (OUTPATIENT)
Dept: REHABILITATION | Facility: HOSPITAL | Age: 65
End: 2025-03-11
Payer: MEDICARE

## 2025-03-11 DIAGNOSIS — M54.16 LUMBAR RADICULOPATHY: Primary | ICD-10-CM

## 2025-03-11 PROCEDURE — 97112 NEUROMUSCULAR REEDUCATION: CPT | Mod: KX,PO

## 2025-03-13 ENCOUNTER — OFFICE VISIT (OUTPATIENT)
Dept: CARDIOLOGY | Facility: CLINIC | Age: 65
End: 2025-03-13
Payer: MEDICARE

## 2025-03-13 VITALS
BODY MASS INDEX: 24.34 KG/M2 | SYSTOLIC BLOOD PRESSURE: 135 MMHG | DIASTOLIC BLOOD PRESSURE: 72 MMHG | HEART RATE: 51 BPM | WEIGHT: 170 LBS | HEIGHT: 70 IN

## 2025-03-13 DIAGNOSIS — M54.41 CHRONIC BILATERAL LOW BACK PAIN WITH RIGHT-SIDED SCIATICA: Chronic | ICD-10-CM

## 2025-03-13 DIAGNOSIS — I87.2 VENOUS INSUFFICIENCY OF BOTH LOWER EXTREMITIES: ICD-10-CM

## 2025-03-13 DIAGNOSIS — G89.29 CHRONIC BILATERAL LOW BACK PAIN WITH RIGHT-SIDED SCIATICA: Chronic | ICD-10-CM

## 2025-03-13 DIAGNOSIS — M79.89 LEG SWELLING: Primary | ICD-10-CM

## 2025-03-13 DIAGNOSIS — M54.50 BILATERAL LOW BACK PAIN WITHOUT SCIATICA, UNSPECIFIED CHRONICITY: ICD-10-CM

## 2025-03-13 DIAGNOSIS — M79.606 PAIN OF LOWER EXTREMITY, UNSPECIFIED LATERALITY: ICD-10-CM

## 2025-03-13 DIAGNOSIS — M54.16 LUMBAR RADICULOPATHY: ICD-10-CM

## 2025-03-13 PROCEDURE — 99999 PR PBB SHADOW E&M-EST. PATIENT-LVL III: CPT | Mod: PBBFAC,,, | Performed by: INTERNAL MEDICINE

## 2025-03-13 PROCEDURE — 99213 OFFICE O/P EST LOW 20 MIN: CPT | Mod: PBBFAC | Performed by: INTERNAL MEDICINE

## 2025-03-13 RX ORDER — HYDROCORTISONE 25 MG/G
CREAM TOPICAL
COMMUNITY
Start: 2024-12-23

## 2025-03-13 RX ORDER — TRIAMCINOLONE ACETONIDE 1 MG/G
CREAM TOPICAL
COMMUNITY
Start: 2024-12-23

## 2025-03-13 NOTE — PATIENT INSTRUCTIONS
Assessment/Plan:  Reji Gates is a 63 y.o. male with HIV, HLD, low back pain, arthritis, who presents for an initial appointment.    Leg Swelling-Due to venous insufficiency. BLE Venous Reflux Study on 12/11/2024 revealed hemodynamically significant BLE venous reflux and no DVT.  RAFAELA Study 12/11/2024 showed normal ABIs and TBIs with normal waveforms. CTA Abd/Pelvis with Iliofemoral Runoff on 12/19/20254 revealed minimal scattered atherosclerosis. No significant vascular stenosis with three-vessel runoff bilaterally. Recommend wearing graduated compression hose.  Limit sodium intake to 2000 mg daily.  Limit volume intake to 1.5 L daily.  Elevate legs when resting.    2. Back Pain/Leg Pain- Due to lumbar ddd. Workup shows no evidence of flow limiting PAD. CTA Abd/Pelvis with Iliofemoral Runoff on 12/19/20254 revealed minimal scattered atherosclerosis. No significant vascular stenosis with three-vessel runoff bilaterally. MRI Lumbar Spine on 12/19/2024 showed degenerative findings of the lower lumbar spine with severe bilateral neural foraminal narrowing at L5-S1. Continue management of lumbar dddper Neurology and physical therapy.      3. HLD- The 10-year ASCVD risk score (Summer DK, et al., 2019) is: 13.8%.      Follow up in 6 months with lipids prior

## 2025-03-13 NOTE — PROGRESS NOTES
"[facility]      Chief Complaint   Patient presents with    Peripheral Neuropathy       Patient ID: Reji Gates is a 63 y.o. male with HIV, HLD, low back pain, arthritis, who presents for an initial appointment. Pertinent history/events are as follows:     -Pt kindly referred by Dr. Acuña for evaluation of Neuropathy (per his note on 11/7/2024):  "Patient reports a three year history of burning at the bottom of his feet and sometimes in his hands.  No significant progression has been noted.  Patient has a chronic history of HIV infection, however, it is currently well controlled on HAART therapy.  Of note, the patient reports that he was told that he had a problem with the venous return from his lower extremities.  He clinically notes swelling in his feet after the end of a long day (the patient works on his feet as a ).  On exam, patient has some subtle loss of vibratory sensation in the toes, however, no other signs of neuropathy seen.  Outside labs showed a normal TSH, B12, A1c, and liver enzymes.  HCV and RPR were nonreactive.  Query the effect of poor vascular flow contributing to a localized, distal small fiber neuropathy.  Patient also reports concurrent arthritis in the area.  Differential includes HIV associated distal sensory polyneuropathy."    -At our initial clinic visit on 12/6/2024, Mr. Gates reports pain in feet, lower back, neck.  Also reports leg swelling. No claudication symptoms or tissue loss.   Plan:  Leg Swelling- Likely due to venous insufficiency. Check BLE venous reflux study.  Recommend wearing graduated compression hose.  Limit sodium intake to 2000 mg daily.  Limit volume intake to 1.5 L daily.  Elevate legs when resting.  Back Pain/Leg Pain- Likely due to lumbar ddd. Must also consider flow limiting PAD. Check MRI lumbar spine, exercise RAFAELA study and CTA abd/pelvis with iliofemoral runoff.     HLD- Check lipids.     HPI:  Mr. Gates reports continued " pain in feet, lower back, neck.  States leg swelling is improved. He is wearing compression hose most days. Also reports leg swelling. He is now being followed by Neurology and physical therapy for lumbar ddd. BLE Venous Reflux Study on 12/11/2024 revealed hemodynamically significant BLE venous reflux and no DVT.  RAFAELA Study 12/11/2024 showed normal ABIs and TBIs with normal waveforms. CTA Abd/Pelvis with Iliofemoral Runoff on 12/19/20254 revealed minimal scattered atherosclerosis. No significant vascular stenosis with three-vessel runoff bilaterally. MRI Lumbar Spine on 12/19/2024 showed degenerative findings of the lower lumbar spine with severe bilateral neural foraminal narrowing at L5-S1.     Past Medical History:   Diagnosis Date    Anxiety     Arthritis     Depression     Environmental and seasonal allergies     GERD (gastroesophageal reflux disease)     HIV infection     Hyperlipidemia     Hypogonadism male     Low back pain     Prostate disease      Past Surgical History:   Procedure Laterality Date    NOSE SURGERY      WISDOM TOOTH EXTRACTION       Social History     Socioeconomic History    Marital status: Single   Tobacco Use    Smoking status: Former     Types: Cigarettes     Passive exposure: Past    Smokeless tobacco: Never   Substance and Sexual Activity    Alcohol use: No     Alcohol/week: 0.0 standard drinks of alcohol    Drug use: Yes     Types: Marijuana     Comment: daily    Sexual activity: Yes     Partners: Male     Social Drivers of Health     Financial Resource Strain: Low Risk  (3/13/2025)    Overall Financial Resource Strain (CARDIA)     Difficulty of Paying Living Expenses: Not hard at all   Food Insecurity: No Food Insecurity (3/13/2025)    Hunger Vital Sign     Worried About Running Out of Food in the Last Year: Never true     Ran Out of Food in the Last Year: Never true   Transportation Needs: No Transportation Needs (3/13/2025)    PRAPARE - Transportation     Lack of Transportation  (Medical): No     Lack of Transportation (Non-Medical): No   Physical Activity: Sufficiently Active (3/13/2025)    Exercise Vital Sign     Days of Exercise per Week: 5 days     Minutes of Exercise per Session: 150+ min   Stress: Stress Concern Present (3/13/2025)    [employer] - Occupational Stress Questionnaire     Feeling of Stress : Rather much   Housing Stability: Low Risk  (3/13/2025)    Housing Stability Vital Sign     Unable to Pay for Housing in the Last Year: No     Homeless in the Last Year: No     Family History   Problem Relation Name Age of Onset    Hypertension Mother      Arthritis Mother      Cancer Father      COPD Sister      No Known Problems Maternal Grandmother      No Known Problems Maternal Grandfather      No Known Problems Paternal Grandmother      No Known Problems Paternal Grandfather         Review of patient's allergies indicates:   Allergen Reactions    Milk containing products (dairy) Diarrhea and Nausea Only       Medication List with Changes/Refills   Current Medications    AMLODIPINE (NORVASC) 10 MG TABLET    Take 10 mg by mouth once daily.    ASCORBIC ACID (VITAMIN C) 1000 MG TABLET    Take 1,000 mg by mouth 2 (two) times daily.    CLINDAMYCIN PHOSPHATE 1% (CLINDAGEL) 1 % GEL    as needed.    GABAPENTIN (NEURONTIN) 100 MG CAPSULE    Take 3 capsules (300 mg total) by mouth 3 (three) times daily.    GENVOYA [phone] MG TAB    Daily.    HYDROCORTISONE 2.5 % CREAM    Apply topically as needed.    MULTIVIT-MINERALS/FERROUS FUM (MULTI VITAMIN ORAL)    Take 1 capsule by mouth Daily.    NAPROXEN SODIUM (ALEVE ORAL)    Take by mouth as needed.    TADALAFIL (CIALIS) 5 MG TABLET    Take 5 mg by mouth Daily.    TRIAMCINOLONE ACETONIDE 0.1% (KENALOG) 0.1 % CREAM    Apply topically as needed.   Discontinued Medications    RANITIDINE (ZANTAC) 150 MG TABLET    Take 150 mg by mouth 2 (two) times daily.       Review of Systems  Constitution: Denies chills, fever,  "and sweats.  HENT: Denies headaches or blurry vision.  Cardiovascular: Denies chest pain or irregular heart beat.  Respiratory: Denies cough or shortness of breath.  Gastrointestinal: Denies abdominal pain, nausea, or vomiting.  Musculoskeletal: Denies muscle cramps.  Neurological: Denies dizziness or focal weakness.  Psychiatric/Behavioral: Normal mental status.  Hematologic/Lymphatic: Denies bleeding problem or easy bruising/bleeding.  Skin: Denies rash or suspicious lesions    Physical Examination  /72 (BP Location: Left arm, Patient Position: Sitting)   Pulse (!) 51   Ht 5' 10" (1.778 m)   Wt 77.1 kg (169 lb 15.6 oz)   BMI 24.39 kg/m²     Constitutional: No acute distress, conversant  HEENT: Sclera anicteric, Pupils equal, round and reactive to light, extraocular motions intact, Oropharynx clear  Neck: No JVD, no carotid bruits  Cardiovascular: regular rate and rhythm, no murmur, rubs or gallops, normal S1/S2  Pulmonary: Clear to auscultation bilaterally  Abdominal: Abdomen soft, nontender, nondistended, positive bowel sounds  Extremities: BLE's with trace to 1 pitting edema and venous stasis dermatitis   Pulses:  Carotid pulses are 2+ on the right side, and 2+ on the left side.  Radial pulses are 2+ on the right side, and 2+ on the left side.   Femoral pulses are 2+ on the right side, and 2+ on the left side.  Popliteal pulses are 2+ on the right side, and 2+ on the left side.   Dorsalis pedis pulses are 2+ on the right side, and 2+ on the left side.   Posterior tibial pulses are 2+ on the right side, and 2+ on the left side.    Skin: No ecchymosis, erythema, or ulcers  Psych: Alert and oriented x 3, appropriate affect  Neuro: CNII-XII intact, no focal deficits    Labs:  Most Recent Data  CBC:   Lab Results   Component Value Date    WBC 5.5 10/24/2019    HGB 15.5 10/24/2019    HCT 45.8 10/24/2019     10/24/2019    MCV 89.6 10/24/2019    RDW 12.8 10/24/2019     BMP:   Lab Results   Component " "Value Date     10/24/2019    K 4.4 10/24/2019     10/24/2019    CO2 27 10/24/2019    BUN 25 10/24/2019    CREATININE 0.88 12/13/2024    GLU 90 10/24/2019    CALCIUM 9.3 10/24/2019     LFTS;   Lab Results   Component Value Date    PROT 6.6 10/24/2019    ALBUMIN 4.3 10/24/2019    BILITOT 0.5 10/24/2019    AST 31 10/24/2019    ALKPHOS 78 10/24/2019    ALT 24 10/24/2019     COAGS: No results found for: "INR", "PROTIME", "PTT"  FLP:   Lab Results   Component Value Date    CHOL 149 03/08/2025    HDL 41 03/08/2025    LDLCALC 96.0 03/08/2025    TRIG 60 03/08/2025    CHOLHDL 27.5 03/08/2025     CARDIAC: No results found for: "TROPONINI", "CKTOTAL", "CKMB", "BNP"    Imaging:    CTA Abd/Pelvis with Iliofemoral Runoff 12/19/20254:  Minimal scattered atherosclerosis. No significant vascular stenosis with three-vessel runoff bilaterally.     MRI Lumbar Spine 12/19/2024:  Degenerative findings of the lower lumbar spine with severe bilateral neural foraminal narrowing at L5-S1.     BLE Venous Reflux Study 12/11/2024:    There is no evidence of a right lower extremity DVT.    There is no evidence of a left lower extremity DVT.    The right greater saphenous vein has reflux.    The left common femoral vein has reflux.    RAFAELA Study 12/11/2024:    Normal ABIs and TBIs.    Waveforms are normal.    Assessment/Plan:  Reji Gates is a 63 y.o. male with HIV, HLD, low back pain, arthritis, who presents for an initial appointment.    Leg Swelling-Due to venous insufficiency. BLE Venous Reflux Study on 12/11/2024 revealed hemodynamically significant BLE venous reflux and no DVT.  RAFAELA Study 12/11/2024 showed normal ABIs and TBIs with normal waveforms. CTA Abd/Pelvis with Iliofemoral Runoff on 12/19/20254 revealed minimal scattered atherosclerosis. No significant vascular stenosis with three-vessel runoff bilaterally. Recommend wearing graduated compression hose.  Limit sodium intake to 2000 mg daily.  Limit volume intake to " 1.5 L daily.  Elevate legs when resting.    2. Back Pain/Leg Pain- Due to lumbar ddd. Workup shows no evidence of flow limiting PAD. CTA Abd/Pelvis with Iliofemoral Runoff on 12/19/20254 revealed minimal scattered atherosclerosis. No significant vascular stenosis with three-vessel runoff bilaterally. MRI Lumbar Spine on 12/19/2024 showed degenerative findings of the lower lumbar spine with severe bilateral neural foraminal narrowing at L5-S1. Continue management of lumbar dddper Neurology and physical therapy.      3. HLD- The 10-year ASCVD risk score (Summer DK, et al., 2019) is: 13.8%.      Follow up in 6 months with lipids prior    Total duration of face to face visit time 20 minutes.  Total time spent counseling greater than fifty percent of total visit time.  Counseling included discussion regarding imaging findings, diagnosis, possibilities, treatment options, risks and benefits.  The patient had many questions regarding the options and long-term effects.    Delta Moreno MD, PhD  Interventional Cardiology

## 2025-03-14 ENCOUNTER — CLINICAL SUPPORT (OUTPATIENT)
Dept: REHABILITATION | Facility: HOSPITAL | Age: 65
End: 2025-03-14
Payer: MEDICARE

## 2025-03-14 DIAGNOSIS — M54.16 LUMBAR RADICULOPATHY: ICD-10-CM

## 2025-03-14 DIAGNOSIS — G89.29 CHRONIC LOW BACK PAIN WITHOUT SCIATICA, UNSPECIFIED BACK PAIN LATERALITY: Primary | Chronic | ICD-10-CM

## 2025-03-14 DIAGNOSIS — M54.50 CHRONIC LOW BACK PAIN WITHOUT SCIATICA, UNSPECIFIED BACK PAIN LATERALITY: Primary | Chronic | ICD-10-CM

## 2025-03-14 PROCEDURE — 97112 NEUROMUSCULAR REEDUCATION: CPT | Mod: PO | Performed by: PHYSICAL THERAPIST

## 2025-03-17 NOTE — PROGRESS NOTES
Outpatient Rehab    Physical Therapy Visit    Patient Name: Reji Gates  MRN: [id number]  YOB: 1960  Encounter Date: 3/14/2025    Therapy Diagnosis:   Encounter Diagnoses   Name Primary?    LBP (low back pain) Yes    Lumbar radiculopathy      Physician: Bean Acuña MD    Physician Orders: Eval and Treat  Medical Diagnosis: Lumbar radiculopathy    Visit # / Visits Authorized:  11 / 20  Date of Evaluation: 2/4/2025  Insurance Authorization Period: 2/4/2025 to 12/31/2025  Plan of Care Certification:  2/4/2025 to 5/4/2025      PT/PTA:     Number of PTA visits since last PT visit:   Time In: 0900   Time Out: 0945  Total Time: 45   Total Billable Time: 25 min 1:1    FOTO:  Intake Score:  %  Survey Score 1:  %  Survey Score 2:  %         Subjective   he dropped his gabapentin down and can feel things a little more but still no more than his normal 1-2/10.  Pain reported as 1/10.      Objective            Treatment:  Therapeutic Exercise  TE 1: BKFO GTB X 20- progressed to BTB  TE 2: Bridge with Band GTB 2x10- progressed to Blue theraband  TE 3: LTR X 20 with add ball  TE 4: clamshell X 20 each side GTB- progressed to blue theraband  Balance/Neuromuscular Re-Education  NMR 1: PPT 20 X 3 sec hold with add ball  NMR 2: standing press into Ball for TrA engagement 2x10 X 5 sec hold  NMR 3: standing TrA press with ABD in standing position 2X10 each side  NMR 4: standing TrA press with extension in standing position 2 X 10  NMR 5: pallof press RTB X 20- progressed to green  NMR 6: Sciatic nerve glides x30 each leg    Time Entry(in minutes):  Neuromuscular Re-Education Time Entry: 30    Assessment & Plan   Assessment: Mr. Mendoza continues to show excellent progress with more challenging bands and is showing good improvement in his pain levels, even with a reduiction in pain medication - progress standing activity next session as able  Evaluation/Treatment Tolerance: Patient tolerated treatment  well    Patient will continue to benefit from skilled outpatient physical therapy to address the deficits listed in the problem list box on initial evaluation, provide pt/family education and to maximize pt's level of independence in the home and community environment.     Patient's spiritual, cultural, and educational needs considered and patient agreeable to plan of care and goals.           Plan: Continue with POC    Goals:   Active       LTG       50% FOTO score improvement (Progressing)       Start:  02/04/25    Expected End:  04/01/25            Patient will be able to carry 50% without increasing pain (Progressing)       Start:  02/04/25    Expected End:  04/01/25            Pt ramon be able to stand > 8 hours for work withou pain (Progressing)       Start:  02/04/25    Expected End:  04/01/25               STG       Patient will show 50% reduction in lumbar spine stiffness (Progressing)       Start:  02/04/25    Expected End:  03/18/25            Pt will show 1/2 grade lateral hip MMT improvement (Progressing)       Start:  02/04/25    Expected End:  03/18/25            Patient will be able tosit > 30 minutes and rise without pain (Progressing)       Start:  02/04/25    Expected End:  03/18/25                Alethea Bender, PT, DPT

## 2025-03-18 ENCOUNTER — CLINICAL SUPPORT (OUTPATIENT)
Dept: REHABILITATION | Facility: HOSPITAL | Age: 65
End: 2025-03-18
Payer: MEDICARE

## 2025-03-18 DIAGNOSIS — M54.50 CHRONIC LOW BACK PAIN WITHOUT SCIATICA, UNSPECIFIED BACK PAIN LATERALITY: Primary | Chronic | ICD-10-CM

## 2025-03-18 DIAGNOSIS — G89.29 CHRONIC LOW BACK PAIN WITHOUT SCIATICA, UNSPECIFIED BACK PAIN LATERALITY: Primary | Chronic | ICD-10-CM

## 2025-03-18 DIAGNOSIS — M54.16 LUMBAR RADICULOPATHY: ICD-10-CM

## 2025-03-18 NOTE — PROGRESS NOTES
Outpatient Rehab    Physical Therapy Visit    Patient Name: Reji Gates  MRN: [id number]  YOB: 1960  Encounter Date: 3/18/2025    Therapy Diagnosis:   Encounter Diagnoses   Name Primary?    LBP (low back pain) Yes    Lumbar radiculopathy      Physician: Bean Acuña MD    Physician Orders: Eval and Treat  Medical Diagnosis: Lumbar radiculopathy    Visit # / Visits Authorized:  12 / 20  Date of Evaluation: 2/4/2025  Insurance Authorization Period: 2/4/2025 to 12/31/2025  Plan of Care Certification:  2/4/2025 to 5/4/2025      PT/PTA:     Number of PTA visits since last PT visit:   Time In: 0755   Time Out: 0850  Total Time: 55   Total Billable Time: 55 min 1:1    FOTO:  Intake Score:  %  Survey Score 1:  %  Survey Score 2:  %         Subjective   still on a lower dose of meds and was able to tolerate ~ 5 hour drive with only some soreness in the last hour.  Pain reported as 1/10.      Objective            Treatment:  Therapeutic Exercise  TE 2: Bridge with Band BlueTB 2x10- progressed to  march  TE 4: clamshell X 20 each side GTB-  TE 5: reverse clamshell X 20 each side RTB  TE 6: open books X 20 each side -no issues  Balance/Neuromuscular Re-Education  NMR 2: standing press into Ball for TrA engagement 2x10 X 5 sec hold  NMR 3: standing TrA press with ABD in standing position 2X10 each side  NMR 4: standing TrA press with extension in standing position 2 X 10  NMR 5: pallof press RTB X 20- progressed to green  NMR 6: pallof step out & rotation X 20 each way each direction GTB  NMR 7: Bridge GTB 2x10 with march  NMR 9: dead bugs 2 X 10 with ball press today  NMR 10: +palloff press with lift RTB x20    Time Entry(in minutes):  Neuromuscular Re-Education Time Entry: 40  Therapeutic Exercise Time Entry: 15    Assessment & Plan   Assessment: Mr. Mendoza continues to show excellent progress with more challenging bands and is showing good improvement in his pain levels, even with a reduiction in  pain medication - and responded well to progressive standing ex today, tent DC next visit with 4 week follow up  Evaluation/Treatment Tolerance: Patient tolerated treatment well    Patient will continue to benefit from skilled outpatient physical therapy to address the deficits listed in the problem list box on initial evaluation, provide pt/family education and to maximize pt's level of independence in the home and community environment.     Patient's spiritual, cultural, and educational needs considered and patient agreeable to plan of care and goals.           Plan: tentative DC next visit with 4 week follow up    Goals:   Active       LTG       50% FOTO score improvement (Progressing)       Start:  02/04/25    Expected End:  04/01/25            Patient will be able to carry 50% without increasing pain (Progressing)       Start:  02/04/25    Expected End:  04/01/25            Pt ramon be able to stand > 8 hours for work withou pain (Progressing)       Start:  02/04/25    Expected End:  04/01/25               STG       Patient will show 50% reduction in lumbar spine stiffness (Progressing)       Start:  02/04/25    Expected End:  03/18/25            Pt will show 1/2 grade lateral hip MMT improvement (Progressing)       Start:  02/04/25    Expected End:  03/18/25            Patient will be able tosit > 30 minutes and rise without pain (Progressing)       Start:  02/04/25    Expected End:  03/18/25                Alethea Bender, PT, DPT

## 2025-03-21 ENCOUNTER — CLINICAL SUPPORT (OUTPATIENT)
Dept: REHABILITATION | Facility: HOSPITAL | Age: 65
End: 2025-03-21
Payer: MEDICARE

## 2025-03-21 DIAGNOSIS — M54.16 LUMBAR RADICULOPATHY: ICD-10-CM

## 2025-03-21 DIAGNOSIS — M54.50 CHRONIC LOW BACK PAIN WITHOUT SCIATICA, UNSPECIFIED BACK PAIN LATERALITY: Primary | Chronic | ICD-10-CM

## 2025-03-21 DIAGNOSIS — G89.29 CHRONIC LOW BACK PAIN WITHOUT SCIATICA, UNSPECIFIED BACK PAIN LATERALITY: Primary | Chronic | ICD-10-CM

## 2025-03-21 PROCEDURE — 97110 THERAPEUTIC EXERCISES: CPT | Mod: PO | Performed by: PHYSICAL THERAPIST

## 2025-03-21 PROCEDURE — 97112 NEUROMUSCULAR REEDUCATION: CPT | Mod: PO | Performed by: PHYSICAL THERAPIST

## 2025-03-24 ENCOUNTER — PATIENT MESSAGE (OUTPATIENT)
Dept: CARDIOLOGY | Facility: CLINIC | Age: 65
End: 2025-03-24
Payer: MEDICARE

## 2025-03-24 NOTE — PROGRESS NOTES
Outpatient Rehab    Physical Therapy Visit    Patient Name: Reji Gates  MRN: [id number]  YOB: 1960  Encounter Date: 3/21/2025    Therapy Diagnosis:   Encounter Diagnoses   Name Primary?    LBP (low back pain) Yes    Lumbar radiculopathy      Physician: Bean Acuña MD    Physician Orders: Eval and Treat  Medical Diagnosis: Lumbar radiculopathy    Visit # / Visits Authorized:  13 / 20  Insurance Authorization Period: 2/4/2025 to 12/31/2025  Date of Evaluation: 2/4/2025  Plan of Care Certification: 2/4/2025 to 5/4/2025     PT/PTA:     Number of PTA visits since last PT visit:   Time In: 0900   Time Out: 0950  Total Time: 50   Total Billable Time:   25 min 1:1    FOTO:  Intake Score:  %  Survey Score 1:  %  Survey Score 2:  %         Subjective   everything is holding up well and he feels ready for DC at this time, pain staying low even when ont taking meds.  Pain reported as 0/10.      Objective            Treatment:  Therapeutic Exercise  TE 1: BKFO GTB X 20- progressed to BTB  TE 2: Bridge with Band BlueTB 2x10- progressed to  march  TE 4: clamshell X 20 each side GTB-  TE 5: reverse clamshell X 20 each side RTB  TE 6: open books X 20 each side -no issues  Manual Therapy  MT 1: STM to R hip piriformis, GM and QL  Balance/Neuromuscular Re-Education  NMR 2: standing press into Ball for TrA engagement 2x10 X 5 sec hold  NMR 3: standing TrA press with ABD in standing position 2X10 each side  NMR 4: standing TrA press with extension in standing position 2 X 10  NMR 5: pallof press RTB X 20- progressed to green  NMR 6: pallof step out & rotation X 20 each way each direction GTB  NMR 8: +90/90 heel tap 4x5  NMR 9: dead bugs 2 X 10 with ball press today  NMR 10: +palloff press with lift RTB x20    Time Entry(in minutes):  Manual Therapy Time Entry: 10  Neuromuscular Re-Education Time Entry: 25  Therapeutic Exercise Time Entry: 15    Assessment & Plan   Assessment: Reji has shown ecvellent  carryover ad is ready to tentatively DC at this time with a 4 week follow up. He is meeting goals and reducing medication without increasing pain levels. Reassess in 4 weeks       Patient will continue to benefit from skilled outpatient physical therapy to address the deficits listed in the problem list box on initial evaluation, provide pt/family education and to maximize pt's level of independence in the home and community environment.     Patient's spiritual, cultural, and educational needs considered and patient agreeable to plan of care and goals.           Plan: tentative DC next visit with 4 week follow up    Goals:   Active       LTG       50% FOTO score improvement (Progressing)       Start:  02/04/25    Expected End:  04/01/25            Patient will be able to carry 50% without increasing pain (Progressing)       Start:  02/04/25    Expected End:  04/01/25            Pt ramon be able to stand > 8 hours for work withou pain (Progressing)       Start:  02/04/25    Expected End:  04/01/25               STG       Patient will show 50% reduction in lumbar spine stiffness (Progressing)       Start:  02/04/25    Expected End:  03/18/25            Pt will show 1/2 grade lateral hip MMT improvement (Progressing)       Start:  02/04/25    Expected End:  03/18/25            Patient will be able tosit > 30 minutes and rise without pain (Progressing)       Start:  02/04/25    Expected End:  03/18/25                Alethea Bender, PT, DPT

## 2025-04-22 ENCOUNTER — PATIENT MESSAGE (OUTPATIENT)
Dept: CARDIOLOGY | Facility: CLINIC | Age: 65
End: 2025-04-22
Payer: MEDICARE

## 2025-04-22 DIAGNOSIS — M79.89 LEG SWELLING: Primary | ICD-10-CM

## 2025-04-22 NOTE — TELEPHONE ENCOUNTER
Called and notified pt that the order is now in the system and rx was routed over to [facility]. Pt verbalized understanding.

## 2025-04-28 PROBLEM — I87.2 VENOUS INSUFFICIENCY OF BOTH LOWER EXTREMITIES: Status: ACTIVE | Noted: 2025-04-28
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
## 2025-05-07 ENCOUNTER — OFFICE VISIT (OUTPATIENT)
Dept: NEUROLOGY | Facility: CLINIC | Age: 65
End: 2025-05-07
Payer: MEDICARE

## 2025-05-07 VITALS
DIASTOLIC BLOOD PRESSURE: 81 MMHG | HEART RATE: 51 BPM | SYSTOLIC BLOOD PRESSURE: 134 MMHG | BODY MASS INDEX: 24.93 KG/M2 | WEIGHT: 173.75 LBS

## 2025-05-07 DIAGNOSIS — M54.16 LUMBAR RADICULOPATHY: ICD-10-CM

## 2025-05-07 PROCEDURE — 99213 OFFICE O/P EST LOW 20 MIN: CPT | Mod: S$PBB,,, | Performed by: STUDENT IN AN ORGANIZED HEALTH CARE EDUCATION/TRAINING PROGRAM

## 2025-05-07 PROCEDURE — 99213 OFFICE O/P EST LOW 20 MIN: CPT | Mod: PBBFAC | Performed by: STUDENT IN AN ORGANIZED HEALTH CARE EDUCATION/TRAINING PROGRAM

## 2025-05-07 PROCEDURE — 99999 PR PBB SHADOW E&M-EST. PATIENT-LVL III: CPT | Mod: PBBFAC,,, | Performed by: STUDENT IN AN ORGANIZED HEALTH CARE EDUCATION/TRAINING PROGRAM

## 2025-05-07 RX ORDER — GABAPENTIN 100 MG/1
100 CAPSULE ORAL 3 TIMES DAILY PRN
Qty: 270 CAPSULE | Refills: 3 | Status: SHIPPED | OUTPATIENT
Start: 2025-05-07 | End: 2026-05-07

## (undated) DEVICE — SUT VICRYL PLUS 3-0 SH 18IN

## (undated) DEVICE — DECANTER VIAL ASEPTIC TRANSFER

## (undated) DEVICE — GLOVE BIOGEL SKINSENSE PI 8.0

## (undated) DEVICE — SYS CLSR DERMABOND PRINEO 22CM

## (undated) DEVICE — SYR B-D DISP CONTROL 10CC100/C

## (undated) DEVICE — SUT VICRYL PLUS 3-0 18IN

## (undated) DEVICE — NDL HYPO REG 25G X 1 1/2

## (undated) DEVICE — GLOVE BIOGEL SKINSENSE PI 6.0

## (undated) DEVICE — SEALER LIGASURE CRV 18.8CM

## (undated) DEVICE — CLOSURE SKIN STERI STRIP 1/2X4

## (undated) DEVICE — SEE MEDLINE ITEM 156930

## (undated) DEVICE — SEE MEDLINE ITEM 152622

## (undated) DEVICE — APPLICATOR CHLORAPREP ORN 26ML

## (undated) DEVICE — GOWN SMART IMP BREATHABLE XXLG

## (undated) DEVICE — SUT ETHIBOND 0 CR/CT-2 8-18

## (undated) DEVICE — ELECTRODE REM PLYHSV RETURN 9

## (undated) DEVICE — SUT MCRYL PLUS 4-0 PS2 27IN

## (undated) DEVICE — SYS PRINEO SKIN CLOSURE

## (undated) DEVICE — DRAIN PENROSE XRAY 18 X 1/4 ST

## (undated) DEVICE — GLOVE BIOGEL SKINSENSE PI 6.5

## (undated) DEVICE — DRAIN PENROSE XRAY 12 X 1/4 ST

## (undated) DEVICE — SEE MEDLINE ITEM 157148

## (undated) DEVICE — SUT ETHIBOND EXCEL 0 CT2 30